# Patient Record
Sex: FEMALE | Race: WHITE | NOT HISPANIC OR LATINO | Employment: UNEMPLOYED | URBAN - METROPOLITAN AREA
[De-identification: names, ages, dates, MRNs, and addresses within clinical notes are randomized per-mention and may not be internally consistent; named-entity substitution may affect disease eponyms.]

---

## 2017-08-03 ENCOUNTER — TELEPHONE (OUTPATIENT)
Dept: PRIMARY CARE CLINIC | Facility: CLINIC | Age: 42
End: 2017-08-03

## 2017-08-03 NOTE — TELEPHONE ENCOUNTER
----- Message from Lillian Wing sent at 8/1/2017  9:44 AM CDT -----  Contact patient regarding request for MRI, she states insurance denied MRI due to incomplete information and when sent it was not sent on time, MRI needs to be preformed on both kneescontact patient [phone].    Thank you

## 2017-08-03 NOTE — TELEPHONE ENCOUNTER
Patient calling requesting new order for MRI of both knees to be submitted again.. She was denied because of insufficient records sent that was requested.

## 2017-08-31 ENCOUNTER — DOCUMENTATION ONLY (OUTPATIENT)
Dept: PODIATRY | Facility: CLINIC | Age: 42
End: 2017-08-31

## 2017-08-31 RX ORDER — HYDROCODONE BITARTRATE AND ACETAMINOPHEN 5; 325 MG/1; MG/1
1 TABLET ORAL EVERY 6 HOURS PRN
COMMUNITY
End: 2018-05-09

## 2017-08-31 RX ORDER — IBUPROFEN 800 MG/1
800 TABLET ORAL EVERY 8 HOURS PRN
Refills: 0 | COMMUNITY
Start: 2017-07-20 | End: 2018-12-10 | Stop reason: SDUPTHER

## 2017-08-31 RX ORDER — ALBUTEROL SULFATE 90 UG/1
AEROSOL, METERED RESPIRATORY (INHALATION)
Refills: 5 | COMMUNITY
Start: 2017-07-21 | End: 2018-02-23 | Stop reason: SDUPTHER

## 2017-08-31 RX ORDER — METHYLPREDNISOLONE 4 MG/1
TABLET ORAL
Refills: 0 | COMMUNITY
Start: 2017-07-10 | End: 2017-10-16

## 2017-08-31 RX ORDER — OXYCODONE HYDROCHLORIDE 30 MG/1
TABLET ORAL
Refills: 0 | COMMUNITY
Start: 2017-07-20 | End: 2018-11-08

## 2017-08-31 RX ORDER — GABAPENTIN 600 MG/1
TABLET ORAL
Refills: 0 | COMMUNITY
Start: 2017-07-20 | End: 2018-12-20 | Stop reason: SDUPTHER

## 2017-09-01 ENCOUNTER — OFFICE VISIT (OUTPATIENT)
Dept: PRIMARY CARE CLINIC | Facility: CLINIC | Age: 42
End: 2017-09-01
Payer: MEDICAID

## 2017-09-01 VITALS
TEMPERATURE: 99 F | BODY MASS INDEX: 43.4 KG/M2 | HEIGHT: 69 IN | DIASTOLIC BLOOD PRESSURE: 84 MMHG | HEART RATE: 77 BPM | WEIGHT: 293 LBS | OXYGEN SATURATION: 95 % | SYSTOLIC BLOOD PRESSURE: 124 MMHG | RESPIRATION RATE: 18 BRPM

## 2017-09-01 DIAGNOSIS — M54.50 CHRONIC BILATERAL LOW BACK PAIN WITHOUT SCIATICA: ICD-10-CM

## 2017-09-01 DIAGNOSIS — G89.4 CHRONIC PAIN SYNDROME: ICD-10-CM

## 2017-09-01 DIAGNOSIS — R53.81 PHYSICAL DECONDITIONING: ICD-10-CM

## 2017-09-01 DIAGNOSIS — E66.9 OBESITY, UNSPECIFIED OBESITY SEVERITY, UNSPECIFIED OBESITY TYPE: ICD-10-CM

## 2017-09-01 DIAGNOSIS — M17.0 PRIMARY OSTEOARTHRITIS OF BOTH KNEES: ICD-10-CM

## 2017-09-01 DIAGNOSIS — J45.30 MILD PERSISTENT ASTHMA WITHOUT COMPLICATION: Primary | ICD-10-CM

## 2017-09-01 DIAGNOSIS — G89.29 CHRONIC BILATERAL LOW BACK PAIN WITHOUT SCIATICA: ICD-10-CM

## 2017-09-01 PROCEDURE — 3008F BODY MASS INDEX DOCD: CPT | Mod: S$GLB,,, | Performed by: INTERNAL MEDICINE

## 2017-09-01 PROCEDURE — 99213 OFFICE O/P EST LOW 20 MIN: CPT | Mod: S$GLB,,, | Performed by: INTERNAL MEDICINE

## 2017-09-01 RX ORDER — MORPHINE SULFATE 15 MG/1
15 TABLET ORAL NIGHTLY
COMMUNITY
End: 2017-11-15

## 2017-09-02 NOTE — PROGRESS NOTES
Subjective:       Patient ID: Qiana Collins is a 42 y.o. female.    Chief Complaint: Follow-up (mri ordered)    HPI  Pt is 42 WF with h/o severe degenerative arthritis both knees bone on bone seen ortho at [facility] need knee replacements no medical records from [facility] available yet on pain meds  And morbid obesity try get gastric sleeve not approve yet knees hurt more with standing and ambulation not able to work get any gainful occupations           Also asthma multiple meds order for paintemance LABA but not covered  Review of Systems    Objective:      Physical Exam   Constitutional: She is oriented to person, place, and time. She appears well-developed and well-nourished. No distress.   Morbid obesed   HENT:   Head: Normocephalic and atraumatic.   Right Ear: External ear normal.   Left Ear: External ear normal.   Nose: Nose normal.   Mouth/Throat: Oropharynx is clear and moist. No oropharyngeal exudate.   Eyes: Conjunctivae and EOM are normal. Pupils are equal, round, and reactive to light. Right eye exhibits no discharge. Left eye exhibits no discharge.   Neck: Normal range of motion. Neck supple. No thyromegaly present.   Cardiovascular: Normal rate, regular rhythm, normal heart sounds and intact distal pulses.  Exam reveals no gallop and no friction rub.    No murmur heard.  Pulmonary/Chest: Effort normal and breath sounds normal. No respiratory distress. She has no wheezes. She has no rales. She exhibits no tenderness.   Abdominal: Soft. Bowel sounds are normal. She exhibits no distension. There is no tenderness. There is no rebound and no guarding.   Musculoskeletal: Normal range of motion. She exhibits tenderness (bilateral knee pain lower back pain). She exhibits no edema or deformity.   Lymphadenopathy:     She has no cervical adenopathy.   Neurological: She is alert and oriented to person, place, and time.   Skin: Skin is warm and dry. Capillary refill takes less than 2 seconds. No rash noted. No  erythema.   Psychiatric: She has a normal mood and affect. Judgment and thought content normal.   Nursing note and vitals reviewed.      Assessment:       1. Mild persistent asthma without complication    2. Primary osteoarthritis of both knees    3. Obesity, unspecified obesity severity, unspecified obesity type        Plan:       Mild persistent asthma without complication    Primary osteoarthritis of both knees    Obesity, unspecified obesity severity, unspecified obesity type    Other orders  -     mometasone 220 mcg (30 doses) inhaler; Inhale 2 puffs into the lungs once daily. Controller  Dispense: 2 each; Refill: 0

## 2017-10-16 ENCOUNTER — OFFICE VISIT (OUTPATIENT)
Dept: PRIMARY CARE CLINIC | Facility: CLINIC | Age: 42
End: 2017-10-16
Payer: MEDICAID

## 2017-10-16 ENCOUNTER — TELEPHONE (OUTPATIENT)
Dept: PRIMARY CARE CLINIC | Facility: CLINIC | Age: 42
End: 2017-10-16

## 2017-10-16 VITALS
WEIGHT: 293 LBS | BODY MASS INDEX: 47.09 KG/M2 | TEMPERATURE: 99 F | DIASTOLIC BLOOD PRESSURE: 89 MMHG | SYSTOLIC BLOOD PRESSURE: 137 MMHG | OXYGEN SATURATION: 94 % | RESPIRATION RATE: 18 BRPM | HEART RATE: 73 BPM | HEIGHT: 66 IN

## 2017-10-16 DIAGNOSIS — G89.4 CHRONIC PAIN SYNDROME: ICD-10-CM

## 2017-10-16 DIAGNOSIS — I83.813 VARICOSE VEINS OF LEG WITH PAIN, BILATERAL: Primary | ICD-10-CM

## 2017-10-16 DIAGNOSIS — M25.562 CHRONIC PAIN OF BOTH KNEES: ICD-10-CM

## 2017-10-16 DIAGNOSIS — G89.29 CHRONIC BILATERAL LOW BACK PAIN WITHOUT SCIATICA: ICD-10-CM

## 2017-10-16 DIAGNOSIS — M25.561 CHRONIC PAIN OF BOTH KNEES: ICD-10-CM

## 2017-10-16 DIAGNOSIS — M17.0 PRIMARY OSTEOARTHRITIS OF BOTH KNEES: ICD-10-CM

## 2017-10-16 DIAGNOSIS — E66.9 OBESITY, UNSPECIFIED OBESITY SEVERITY, UNSPECIFIED OBESITY TYPE: ICD-10-CM

## 2017-10-16 DIAGNOSIS — M54.50 CHRONIC BILATERAL LOW BACK PAIN WITHOUT SCIATICA: ICD-10-CM

## 2017-10-16 DIAGNOSIS — G89.29 CHRONIC PAIN OF BOTH KNEES: ICD-10-CM

## 2017-10-16 PROCEDURE — 99213 OFFICE O/P EST LOW 20 MIN: CPT | Mod: S$PBB,,, | Performed by: INTERNAL MEDICINE

## 2017-10-16 PROCEDURE — 99214 OFFICE O/P EST MOD 30 MIN: CPT | Mod: PBBFAC,PN | Performed by: INTERNAL MEDICINE

## 2017-10-16 PROCEDURE — 99999 PR PBB SHADOW E&M-EST. PATIENT-LVL IV: CPT | Mod: PBBFAC,,, | Performed by: INTERNAL MEDICINE

## 2017-10-16 RX ORDER — MORPHINE SULFATE 15 MG/1
TABLET, FILM COATED, EXTENDED RELEASE ORAL
COMMUNITY
Start: 2017-08-17 | End: 2017-10-16

## 2017-10-16 RX ORDER — NALOXONE HYDROCHLORIDE 4 MG/.1ML
SPRAY NASAL
COMMUNITY
Start: 2017-07-20 | End: 2018-11-08

## 2017-10-16 NOTE — TELEPHONE ENCOUNTER
----- Message from Arabella Garay sent at 10/16/2017 11:55 AM CDT -----  Contact: self  Patient needs to be seen today due to right leg behind knee and left ankle being swollen and leg is hot to touch and very painful.  This has been going on for 4 days. Please call back at [phone] (home)

## 2017-10-17 ENCOUNTER — TELEPHONE (OUTPATIENT)
Dept: PRIMARY CARE CLINIC | Facility: CLINIC | Age: 42
End: 2017-10-17

## 2017-10-17 NOTE — TELEPHONE ENCOUNTER
----- Message from Tatyana Jean sent at 10/17/2017 10:54 AM CDT -----  Contact: Patient  Qiana, patient  [phone], Calling for ultrasound results, done yesterday. Please advise. Thanks.

## 2017-10-17 NOTE — PROGRESS NOTES
Subjective:       Patient ID: Qiana Collins is a 42 y.o. female.    Chief Complaint: Leg Pain (bilateral)    HPI   Pt c/o bilateral legs pain not only in knees but cleves rt side thigh no sob cp no fever chiol still await for NALINI MRI and surgery will get knee injection next appt not pregnant no trauma  Review of Systems    Objective:      Physical Exam   Constitutional: She is oriented to person, place, and time. She appears well-developed and well-nourished. No distress.   obesed   HENT:   Head: Normocephalic and atraumatic.   Right Ear: External ear normal.   Left Ear: External ear normal.   Nose: Nose normal.   Mouth/Throat: Oropharynx is clear and moist. No oropharyngeal exudate.   Eyes: Conjunctivae and EOM are normal. Pupils are equal, round, and reactive to light. Right eye exhibits no discharge. Left eye exhibits no discharge.   Neck: Normal range of motion. Neck supple. No thyromegaly present.   Cardiovascular: Normal rate, regular rhythm, normal heart sounds and intact distal pulses.  Exam reveals no gallop and no friction rub.    No murmur heard.  Pulmonary/Chest: Effort normal and breath sounds normal. No respiratory distress. She has no wheezes. She has no rales. She exhibits no tenderness.   Abdominal: Soft. Bowel sounds are normal. She exhibits no distension. There is no tenderness. There is no rebound and no guarding.   Musculoskeletal: Normal range of motion. She exhibits tenderness (bilateral knee pain). She exhibits no edema or deformity.   Lymphadenopathy:     She has no cervical adenopathy.   Neurological: She is alert and oriented to person, place, and time.   Skin: Skin is warm and dry. Capillary refill takes less than 2 seconds. No rash noted. No erythema.   No redness in legs no swelling edema no hoffnamn siigns   Psychiatric: She has a normal mood and affect. Judgment and thought content normal.   Nursing note and vitals reviewed.      Assessment:       1. Varicose veins of leg with  pain, bilateral    2. Chronic pain of both knees        Plan:       Varicose veins of leg with pain, bilateral  -     VAS US Venous Legs Bilateral; Future; Expected date: 10/16/2017    Chronic pain of both knees  -     Ambulatory Referral to Orthopedics

## 2017-11-15 ENCOUNTER — NURSE TRIAGE (OUTPATIENT)
Dept: ADMINISTRATIVE | Facility: CLINIC | Age: 42
End: 2017-11-15

## 2017-11-15 ENCOUNTER — OFFICE VISIT (OUTPATIENT)
Dept: PRIMARY CARE CLINIC | Facility: CLINIC | Age: 42
End: 2017-11-15
Payer: MEDICAID

## 2017-11-15 ENCOUNTER — TELEPHONE (OUTPATIENT)
Dept: PRIMARY CARE CLINIC | Facility: CLINIC | Age: 42
End: 2017-11-15

## 2017-11-15 VITALS
HEART RATE: 71 BPM | RESPIRATION RATE: 18 BRPM | DIASTOLIC BLOOD PRESSURE: 85 MMHG | WEIGHT: 293 LBS | HEIGHT: 66 IN | SYSTOLIC BLOOD PRESSURE: 145 MMHG | TEMPERATURE: 99 F | BODY MASS INDEX: 47.09 KG/M2 | OXYGEN SATURATION: 94 %

## 2017-11-15 DIAGNOSIS — M54.50 CHRONIC BILATERAL LOW BACK PAIN WITHOUT SCIATICA: ICD-10-CM

## 2017-11-15 DIAGNOSIS — E66.01 MORBID OBESITY: Primary | ICD-10-CM

## 2017-11-15 DIAGNOSIS — G89.4 CHRONIC PAIN SYNDROME: ICD-10-CM

## 2017-11-15 DIAGNOSIS — G89.29 CHRONIC BILATERAL LOW BACK PAIN WITHOUT SCIATICA: ICD-10-CM

## 2017-11-15 DIAGNOSIS — I10 ESSENTIAL HYPERTENSION: ICD-10-CM

## 2017-11-15 DIAGNOSIS — M17.0 PRIMARY OSTEOARTHRITIS OF BOTH KNEES: ICD-10-CM

## 2017-11-15 PROCEDURE — 99213 OFFICE O/P EST LOW 20 MIN: CPT | Mod: S$PBB,,, | Performed by: INTERNAL MEDICINE

## 2017-11-15 PROCEDURE — 99999 PR PBB SHADOW E&M-EST. PATIENT-LVL IV: CPT | Mod: PBBFAC,,, | Performed by: INTERNAL MEDICINE

## 2017-11-15 PROCEDURE — 99214 OFFICE O/P EST MOD 30 MIN: CPT | Mod: PBBFAC,PN | Performed by: INTERNAL MEDICINE

## 2017-11-15 RX ORDER — MORPHINE SULFATE 15 MG/1
TABLET, FILM COATED, EXTENDED RELEASE ORAL
COMMUNITY
Start: 2017-11-09 | End: 2018-11-08

## 2017-11-15 NOTE — TELEPHONE ENCOUNTER
----- Message from Milind Hay sent at 11/15/2017  8:42 AM CST -----  Contact: Qiana  Patient 174/104 yesterday. She has headache, dizziness. She has not gotten up yet to walk around yet. She needs an appointment today. She spoke with on call. [phone] (home)   thanks

## 2017-11-15 NOTE — TELEPHONE ENCOUNTER
Reason for Disposition   BP > 160 / 100 and any cardiac or neurologic symptoms (e.g., chest pain, difficulty breathing, unsteady gait, blurred vision)    Protocols used:  HIGH BLOOD PRESSURE-A-OH    Ms. Collins states her blood pressure was 174/104, then 174/107 on yesterday. Patient states she is dizzy and has a headache.

## 2017-11-19 NOTE — PROGRESS NOTES
Subjective:       Patient ID: Qiana Collins is a 42 y.o. female.    Chief Complaint: Headache and Dizziness    HPI  Pt c/o HA and dizziness BP was high at home better now no sob cp no weakness no n/v gaining wt  Review of Systems    Objective:      Physical Exam   Constitutional: She is oriented to person, place, and time. She appears well-developed and well-nourished. No distress.   obesed   HENT:   Head: Normocephalic and atraumatic.   Right Ear: External ear normal.   Left Ear: External ear normal.   Nose: Nose normal.   Mouth/Throat: Oropharynx is clear and moist. No oropharyngeal exudate.   Eyes: Conjunctivae and EOM are normal. Pupils are equal, round, and reactive to light. Right eye exhibits no discharge. Left eye exhibits no discharge.   Neck: Normal range of motion. Neck supple. No thyromegaly present.   Cardiovascular: Normal rate, regular rhythm, normal heart sounds and intact distal pulses.  Exam reveals no gallop and no friction rub.    No murmur heard.  Pulmonary/Chest: Effort normal and breath sounds normal. No respiratory distress. She has no wheezes. She has no rales. She exhibits no tenderness.   Abdominal: Soft. Bowel sounds are normal. She exhibits no distension. There is no tenderness. There is no rebound and no guarding.   Musculoskeletal: Normal range of motion. She exhibits no edema, tenderness or deformity.   Lymphadenopathy:     She has no cervical adenopathy.   Neurological: She is alert and oriented to person, place, and time.   Skin: Skin is warm and dry. Capillary refill takes less than 2 seconds. No rash noted. No erythema.   Psychiatric: She has a normal mood and affect. Judgment and thought content normal.   Nursing note and vitals reviewed.      Assessment:       1. Morbid obesity    2. Essential hypertension    3. Primary osteoarthritis of both knees    4. Chronic bilateral low back pain without sciatica    5. Chronic pain syndrome        Plan:       Morbid obesity  -      Ambulatory Referral to Bariatric Surgery ([facility] [place])    Essential hypertension  Comments:  observe try lisinopril 10 mg po qd check BP in 1 week    Primary osteoarthritis of both knees  Comments:  need knee replacement     Chronic bilateral low back pain without sciatica  Comments:  secondary overwt need gastric sleeve    Chronic pain syndrome

## 2018-02-23 RX ORDER — ALBUTEROL SULFATE 90 UG/1
AEROSOL, METERED RESPIRATORY (INHALATION)
Qty: 18 INHALER | Refills: 5 | Status: SHIPPED | OUTPATIENT
Start: 2018-02-23 | End: 2018-02-27 | Stop reason: SDUPTHER

## 2018-02-27 NOTE — TELEPHONE ENCOUNTER
----- Message from Tatyana Jean sent at 2/23/2018 12:33 PM CST -----  Contact: Patient  Qiana, patient [phone], Calling for refill on Rx VENTOLIN HFA 90 mcg/actuation inhaler. Patient is completely out, needs it very badly. Please advise. Thanks.      [facility]/pharmacy #[id number] - [place], [place] - [address]  [address] [place] [address]  Phone: [phone] Fax: [phone]

## 2018-03-01 RX ORDER — ALBUTEROL SULFATE 90 UG/1
AEROSOL, METERED RESPIRATORY (INHALATION)
Qty: 18 INHALER | Refills: 5 | Status: SHIPPED | OUTPATIENT
Start: 2018-03-01 | End: 2018-05-09 | Stop reason: SDUPTHER

## 2018-05-08 ENCOUNTER — TELEPHONE (OUTPATIENT)
Dept: PRIMARY CARE CLINIC | Facility: CLINIC | Age: 43
End: 2018-05-08

## 2018-05-08 NOTE — TELEPHONE ENCOUNTER
"Spoke with patient, verbalized to patient once PCP evaluates her tomorrow we will have the CPT code for the diagnosis. Patient needs the code to give to the fee for service department to get the cost of injection. Verbalized to patient "You can still be seen tomorrow and if the provider feels an injection is needed then we can bill you for the injection." Patient verbalized understanding.   "

## 2018-05-08 NOTE — TELEPHONE ENCOUNTER
----- Message from Lillian Chakraborty sent at 5/8/2018  2:36 PM CDT -----  Patient has appointment scheduled tomorrow morning at 8:00/would like to receive injection for knee pain/medicaid will not cover/patient needs five digit procedure code to give to Fee for Service to get estimate of costs/please call back at [phone] to advise.

## 2018-05-09 ENCOUNTER — OFFICE VISIT (OUTPATIENT)
Dept: PRIMARY CARE CLINIC | Facility: CLINIC | Age: 43
End: 2018-05-09
Payer: MEDICAID

## 2018-05-09 ENCOUNTER — TELEPHONE (OUTPATIENT)
Dept: PRIMARY CARE CLINIC | Facility: CLINIC | Age: 43
End: 2018-05-09

## 2018-05-09 VITALS
RESPIRATION RATE: 18 BRPM | OXYGEN SATURATION: 98 % | HEART RATE: 88 BPM | TEMPERATURE: 98 F | SYSTOLIC BLOOD PRESSURE: 121 MMHG | DIASTOLIC BLOOD PRESSURE: 76 MMHG

## 2018-05-09 DIAGNOSIS — J45.30 MILD PERSISTENT ASTHMA WITHOUT COMPLICATION: ICD-10-CM

## 2018-05-09 DIAGNOSIS — E66.01 MORBID OBESITY: ICD-10-CM

## 2018-05-09 DIAGNOSIS — M17.0 PRIMARY OSTEOARTHRITIS OF BOTH KNEES: Primary | ICD-10-CM

## 2018-05-09 DIAGNOSIS — I10 ESSENTIAL HYPERTENSION: ICD-10-CM

## 2018-05-09 PROCEDURE — 20605 DRAIN/INJ JOINT/BURSA W/O US: CPT | Mod: S$PBB,50,, | Performed by: INTERNAL MEDICINE

## 2018-05-09 PROCEDURE — 20605 DRAIN/INJ JOINT/BURSA W/O US: CPT | Mod: PBBFAC,PN | Performed by: INTERNAL MEDICINE

## 2018-05-09 PROCEDURE — 99213 OFFICE O/P EST LOW 20 MIN: CPT | Mod: S$PBB,25,, | Performed by: INTERNAL MEDICINE

## 2018-05-09 PROCEDURE — 99213 OFFICE O/P EST LOW 20 MIN: CPT | Mod: PBBFAC,PN | Performed by: INTERNAL MEDICINE

## 2018-05-09 PROCEDURE — 99999 PR PBB SHADOW E&M-EST. PATIENT-LVL III: CPT | Mod: PBBFAC,,, | Performed by: INTERNAL MEDICINE

## 2018-05-09 RX ORDER — LISINOPRIL 10 MG/1
TABLET ORAL
COMMUNITY
Start: 2018-04-18 | End: 2018-05-09 | Stop reason: SDUPTHER

## 2018-05-09 RX ORDER — LISINOPRIL 10 MG/1
10 TABLET ORAL DAILY
Qty: 30 TABLET | Refills: 5 | Status: SHIPPED | OUTPATIENT
Start: 2018-05-09 | End: 2019-03-07 | Stop reason: SDUPTHER

## 2018-05-09 RX ORDER — ALBUTEROL SULFATE 90 UG/1
AEROSOL, METERED RESPIRATORY (INHALATION)
Qty: 18 INHALER | Refills: 5 | Status: SHIPPED | OUTPATIENT
Start: 2018-05-09 | End: 2018-08-02 | Stop reason: SDUPTHER

## 2018-05-09 RX ORDER — FUROSEMIDE 40 MG/1
TABLET ORAL
COMMUNITY
Start: 2018-02-01 | End: 2018-05-09 | Stop reason: SDUPTHER

## 2018-05-09 RX ORDER — FUROSEMIDE 40 MG/1
40 TABLET ORAL DAILY
Qty: 30 TABLET | Refills: 5 | Status: SHIPPED | OUTPATIENT
Start: 2018-05-09 | End: 2019-03-07 | Stop reason: SDUPTHER

## 2018-05-09 NOTE — TELEPHONE ENCOUNTER
----- Message from Tatyana Jean sent at 5/9/2018  3:46 PM CDT -----  Contact: Patient  Qiana, patient [phone], Calling because she was seen today, she was suppose to get three prescriptions faxed to [facility]. There is nothing there. Please call her. Thanks.

## 2018-05-10 NOTE — TELEPHONE ENCOUNTER
Spoke to pt. And notified her that her 3 refills were sent  Her pharmacy lastnight . Pt. Said she will check again and if she has any problems she will give me a call back.

## 2018-05-10 NOTE — TELEPHONE ENCOUNTER
----- Message from Angela Christian sent at 5/10/2018  4:20 PM CDT -----  Contact: self   Patient need a refill on all medications please send to [facility], any questions please call back at [phone] (home) Patient is out of medication    [facility] - [place], [place] - [address] [place] [address]  Phone: [phone] Fax: [phone]

## 2018-05-10 NOTE — PROGRESS NOTES
Subjective:       Patient ID: Qiana Collins is a 43 y.o. female.    Chief Complaint: Knee Pain (requestion knee injection)    HPI  Pt c/o sever pain both knees from OA need knee replacement and also await gastric sleeve to loose wt before kne replacement seeing ortho and [facility] no sob cp pt had relief with injections last time 5-6 mos ago  Review of Systems    Objective:      Physical Exam   Constitutional: She is oriented to person, place, and time. She appears well-developed and well-nourished. No distress.   obesed   HENT:   Head: Normocephalic and atraumatic.   Right Ear: External ear normal.   Left Ear: External ear normal.   Nose: Nose normal.   Mouth/Throat: Oropharynx is clear and moist. No oropharyngeal exudate.   Eyes: Conjunctivae and EOM are normal. Pupils are equal, round, and reactive to light. Right eye exhibits no discharge. Left eye exhibits no discharge.   Neck: Normal range of motion. Neck supple. No thyromegaly present.   Cardiovascular: Normal rate, regular rhythm, normal heart sounds and intact distal pulses.  Exam reveals no gallop and no friction rub.    No murmur heard.  Pulmonary/Chest: Effort normal and breath sounds normal. No respiratory distress. She has no wheezes. She has no rales. She exhibits no tenderness.   Abdominal: Soft. Bowel sounds are normal. She exhibits no distension. There is no tenderness. There is no rebound and no guarding.   Musculoskeletal: Normal range of motion. She exhibits tenderness (bilateral knee pain n swelling no erythema no heat pos crepitus). She exhibits no edema or deformity.   Lymphadenopathy:     She has no cervical adenopathy.   Neurological: She is alert and oriented to person, place, and time.   Skin: Skin is warm and dry. Capillary refill takes less than 2 seconds. No rash noted. No erythema.   Psychiatric: She has a normal mood and affect. Judgment and thought content normal.   Nursing note and vitals reviewed.      Assessment:        1. Primary osteoarthritis of both knees    2. Morbid obesity        Plan:       Primary osteoarthritis of both knees  Comments:  inject both knees with xylocain and 1cc kenalog tolerate well after spray to anesthetize skin    Morbid obesity  Comments:  await gastric sleeve    Other orders  -     lisinopril 10 MG tablet; Take 1 tablet (10 mg total) by mouth once daily.  Dispense: 30 tablet; Refill: 5  -     furosemide (LASIX) 40 MG tablet; Take 1 tablet (40 mg total) by mouth once daily.  Dispense: 30 tablet; Refill: 5  -     albuterol (VENTOLIN HFA) 90 mcg/actuation inhaler; 2 PUFFS AS NEEDED EVERY 4 HRS INHALATION  Dispense: 18 Inhaler; Refill: 5

## 2018-05-24 ENCOUNTER — OFFICE VISIT (OUTPATIENT)
Dept: PRIMARY CARE CLINIC | Facility: CLINIC | Age: 43
End: 2018-05-24
Payer: MEDICAID

## 2018-05-24 VITALS
TEMPERATURE: 99 F | WEIGHT: 293 LBS | DIASTOLIC BLOOD PRESSURE: 75 MMHG | SYSTOLIC BLOOD PRESSURE: 114 MMHG | RESPIRATION RATE: 18 BRPM | HEART RATE: 82 BPM | BODY MASS INDEX: 43.4 KG/M2 | HEIGHT: 69 IN | OXYGEN SATURATION: 95 %

## 2018-05-24 DIAGNOSIS — M17.0 PRIMARY OSTEOARTHRITIS OF BOTH KNEES: Primary | ICD-10-CM

## 2018-05-24 DIAGNOSIS — G89.29 CHRONIC PAIN OF LEFT KNEE: ICD-10-CM

## 2018-05-24 DIAGNOSIS — I10 ESSENTIAL HYPERTENSION: ICD-10-CM

## 2018-05-24 DIAGNOSIS — E66.01 MORBID OBESITY: ICD-10-CM

## 2018-05-24 DIAGNOSIS — M25.562 CHRONIC PAIN OF LEFT KNEE: ICD-10-CM

## 2018-05-24 PROCEDURE — 99213 OFFICE O/P EST LOW 20 MIN: CPT | Mod: S$PBB,,, | Performed by: INTERNAL MEDICINE

## 2018-05-24 PROCEDURE — 93010 ELECTROCARDIOGRAM REPORT: CPT | Mod: S$PBB,,, | Performed by: INTERNAL MEDICINE

## 2018-05-24 PROCEDURE — 93005 ELECTROCARDIOGRAM TRACING: CPT | Mod: PBBFAC,PN | Performed by: INTERNAL MEDICINE

## 2018-05-24 PROCEDURE — 99215 OFFICE O/P EST HI 40 MIN: CPT | Mod: PBBFAC,PN | Performed by: INTERNAL MEDICINE

## 2018-05-24 PROCEDURE — 99999 PR PBB SHADOW E&M-EST. PATIENT-LVL V: CPT | Mod: PBBFAC,,, | Performed by: INTERNAL MEDICINE

## 2018-05-24 RX ORDER — METHYLPREDNISOLONE 4 MG/1
TABLET ORAL
Qty: 1 PACKAGE | Refills: 0 | Status: SHIPPED | OUTPATIENT
Start: 2018-05-24 | End: 2018-08-02

## 2018-05-24 NOTE — PROGRESS NOTES
Subjective:       Patient ID: Qiana Collins is a 43 y.o. female.    Chief Complaint: Knee Pain (mri order)    HPI pt c/ohad knee injection not helping awai gastric sleeve at [facility] not seeing ortho can't find take her insurance  Rt knee hurt too but ot as bad left knee still hurt a lot crying   Review of Systems    Objective:      Physical Exam   Constitutional: She is oriented to person, place, and time. She appears well-developed and well-nourished. No distress.   HENT:   Head: Normocephalic and atraumatic.   Right Ear: External ear normal.   Left Ear: External ear normal.   Nose: Nose normal.   Mouth/Throat: Oropharynx is clear and moist. No oropharyngeal exudate.   Eyes: Conjunctivae and EOM are normal. Pupils are equal, round, and reactive to light. Right eye exhibits no discharge. Left eye exhibits no discharge.   Neck: Normal range of motion. Neck supple. No thyromegaly present.   Cardiovascular: Normal rate, regular rhythm, normal heart sounds and intact distal pulses.  Exam reveals no gallop and no friction rub.    No murmur heard.  Pulmonary/Chest: Effort normal and breath sounds normal. No respiratory distress. She has no wheezes. She has no rales. She exhibits no tenderness.   Abdominal: Soft. Bowel sounds are normal. She exhibits no distension. There is no tenderness. There is no rebound and no guarding.   Musculoskeletal: Normal range of motion. She exhibits tenderness (tenderness aoubd left knee joint with palpation no swelling no redness ). She exhibits no edema or deformity.   Lymphadenopathy:     She has no cervical adenopathy.   Neurological: She is alert and oriented to person, place, and time.   Skin: Skin is warm and dry. Capillary refill takes less than 2 seconds. No rash noted. No erythema.   Psychiatric: She has a normal mood and affect. Judgment and thought content normal.   Nursing note and vitals reviewed.      Assessment:       1. Primary osteoarthritis of both knees    2. Chronic  pain of left knee    3. Morbid obesity    4. Essential hypertension        Plan:       Primary osteoarthritis of both knees  -     X-Ray Knee 3 View Bilateral; Future; Expected date: 05/24/2018  -     methylPREDNISolone (MEDROL DOSEPACK) 4 mg tablet; use as directed  Dispense: 1 Package; Refill: 0  -     KNEE BRACE FOR HOME USE    Chronic pain of left knee  -     methylPREDNISolone (MEDROL DOSEPACK) 4 mg tablet; use as directed  Dispense: 1 Package; Refill: 0  -     KNEE BRACE FOR HOME USE  -     MRI Knee Without Contrast Left; Future; Expected date: 05/24/2018    Morbid obesity    Essential hypertension

## 2018-05-29 ENCOUNTER — CLINICAL SUPPORT (OUTPATIENT)
Dept: PRIMARY CARE CLINIC | Facility: CLINIC | Age: 43
End: 2018-05-29
Payer: MEDICAID

## 2018-05-29 DIAGNOSIS — E66.01 MORBID OBESITY: ICD-10-CM

## 2018-05-29 DIAGNOSIS — I10 ESSENTIAL HYPERTENSION: ICD-10-CM

## 2018-05-29 DIAGNOSIS — M25.562 CHRONIC PAIN OF LEFT KNEE: ICD-10-CM

## 2018-05-29 DIAGNOSIS — G89.29 CHRONIC PAIN OF LEFT KNEE: ICD-10-CM

## 2018-05-29 LAB
ALBUMIN SERPL BCP-MCNC: 3.7 G/DL
ALP SERPL-CCNC: 69 U/L
ALT SERPL W/O P-5'-P-CCNC: 26 U/L
ANION GAP SERPL CALC-SCNC: 8 MMOL/L
AST SERPL-CCNC: 24 U/L
BASOPHILS # BLD AUTO: 0.1 K/UL
BASOPHILS NFR BLD: 0.7 %
BILIRUB SERPL-MCNC: 0.5 MG/DL
BILIRUB SERPL-MCNC: NORMAL MG/DL
BLOOD URINE, POC: NORMAL
BUN SERPL-MCNC: 12 MG/DL
CALCIUM SERPL-MCNC: 9.1 MG/DL
CHLORIDE SERPL-SCNC: 102 MMOL/L
CHOLEST SERPL-MCNC: 192 MG/DL
CHOLEST/HDLC SERPL: 4.9 {RATIO}
CO2 SERPL-SCNC: 29 MMOL/L
COLOR, POC UA: YELLOW
CREAT SERPL-MCNC: 0.8 MG/DL
DIFFERENTIAL METHOD: ABNORMAL
EKG 12-LEAD: NORMAL
EOSINOPHIL # BLD AUTO: 0.5 K/UL
EOSINOPHIL NFR BLD: 4.1 %
ERYTHROCYTE [DISTWIDTH] IN BLOOD BY AUTOMATED COUNT: 14.3 %
EST. GFR  (AFRICAN AMERICAN): >60 ML/MIN/1.73 M^2
EST. GFR  (NON AFRICAN AMERICAN): >60 ML/MIN/1.73 M^2
GLUCOSE SERPL-MCNC: 142 MG/DL
GLUCOSE UR QL STRIP: NORMAL
HCT VFR BLD AUTO: 43.4 %
HDLC SERPL-MCNC: 39 MG/DL
HDLC SERPL: 20.3 %
HGB BLD-MCNC: 14.3 G/DL
KETONES UR QL STRIP: NORMAL
LDLC SERPL CALC-MCNC: 127 MG/DL
LEUKOCYTE ESTERASE URINE, POC: NORMAL
LYMPHOCYTES # BLD AUTO: 2.6 K/UL
LYMPHOCYTES NFR BLD: 22.3 %
MCH RBC QN AUTO: 31.6 PG
MCHC RBC AUTO-ENTMCNC: 33 G/DL
MCV RBC AUTO: 96 FL
MONOCYTES # BLD AUTO: 0.9 K/UL
MONOCYTES NFR BLD: 7.9 %
NEUTROPHILS # BLD AUTO: 7.5 K/UL
NEUTROPHILS NFR BLD: 65 %
NITRITE, POC UA: NORMAL
NONHDLC SERPL-MCNC: 153 MG/DL
PH, POC UA: 7
PLATELET # BLD AUTO: 157 K/UL
PMV BLD AUTO: 11.8 FL
POTASSIUM SERPL-SCNC: 4.1 MMOL/L
PROT SERPL-MCNC: 7.4 G/DL
PROTEIN, POC: NORMAL
RBC # BLD AUTO: 4.53 M/UL
RHEUMATOID FACT SERPL-ACNC: <10 IU/ML
SODIUM SERPL-SCNC: 139 MMOL/L
SPECIFIC GRAVITY, POC UA: 1.01
T4 FREE SERPL-MCNC: 0.93 NG/DL
TRIGL SERPL-MCNC: 132 MG/DL
TSH SERPL DL<=0.005 MIU/L-ACNC: 1.32 UIU/ML
URATE SERPL-MCNC: 7.4 MG/DL
UROBILINOGEN, POC UA: NORMAL
WBC # BLD AUTO: 11.5 K/UL

## 2018-05-29 PROCEDURE — 99999 PR PBB SHADOW E&M-EST. PATIENT-LVL II: CPT | Mod: PBBFAC,,,

## 2018-05-29 PROCEDURE — 80061 LIPID PANEL: CPT

## 2018-05-29 PROCEDURE — 84439 ASSAY OF FREE THYROXINE: CPT

## 2018-05-29 PROCEDURE — 99212 OFFICE O/P EST SF 10 MIN: CPT | Mod: PBBFAC,PN

## 2018-05-29 PROCEDURE — 86431 RHEUMATOID FACTOR QUANT: CPT

## 2018-05-29 PROCEDURE — 80053 COMPREHEN METABOLIC PANEL: CPT

## 2018-05-29 PROCEDURE — 81002 URINALYSIS NONAUTO W/O SCOPE: CPT | Mod: PBBFAC,PN

## 2018-05-29 PROCEDURE — 84550 ASSAY OF BLOOD/URIC ACID: CPT

## 2018-05-29 PROCEDURE — 84443 ASSAY THYROID STIM HORMONE: CPT

## 2018-05-29 PROCEDURE — 85025 COMPLETE CBC W/AUTO DIFF WBC: CPT

## 2018-06-08 ENCOUNTER — TELEPHONE (OUTPATIENT)
Dept: PRIMARY CARE CLINIC | Facility: CLINIC | Age: 43
End: 2018-06-08

## 2018-06-08 DIAGNOSIS — T14.8XXA CARTILAGE TEAR: ICD-10-CM

## 2018-06-08 DIAGNOSIS — R93.89 ABNORMAL MRI: Primary | ICD-10-CM

## 2018-06-08 NOTE — TELEPHONE ENCOUNTER
Notified patient, patient states that [facility] is taking medicaid. However, I gave a name to patient for MD Javi Casper Please sign referral

## 2018-06-08 NOTE — TELEPHONE ENCOUNTER
----- Message from Boris Kelly sent at 6/8/2018  3:00 PM CDT -----  Contact: same  Type:  Test Results    Who Called:  patient  Name of Test (Lab/Mammo/Etc):  MRI  Date of Test:  6/7/18  Ordering Provider:  Blas  Where the test was performed:  [facility]  Best Call Back Number:  [phone]  Additional Information:  n/a

## 2018-06-08 NOTE — TELEPHONE ENCOUNTER
PER Dr. Odonnell  No cartilage of the left side of the left knee joint. Tear of cartilage in back of the knee joint as well. They have a fragment of cartilage floating in the knee joint. Needs to see an orthopedic for a scope?    Called patient, left VM

## 2018-06-12 ENCOUNTER — TELEPHONE (OUTPATIENT)
Dept: PRIMARY CARE CLINIC | Facility: CLINIC | Age: 43
End: 2018-06-12

## 2018-06-12 NOTE — TELEPHONE ENCOUNTER
----- Message from Opal Oleary sent at 6/12/2018  2:29 PM CDT -----  Contact: [phone]  Patient is requesting a call back from the nurse, requesting copy of mri of left knee.  Please call the patient upon request at phone number [phone].

## 2018-07-27 ENCOUNTER — TELEPHONE (OUTPATIENT)
Dept: PRIMARY CARE CLINIC | Facility: CLINIC | Age: 43
End: 2018-07-27

## 2018-07-27 NOTE — TELEPHONE ENCOUNTER
----- Message from Tatyana Jean sent at 7/27/2018  1:54 PM CDT -----  Contact: Patient  Type:  Same Day Appointment Request    Caller is requesting a same day appointment.  Caller declined first available appointment listed below.      Name of Caller:  Eulalia, patient  When is the first available appointment?  08/09/2018  Symptoms:  Both legs swelling  Best Call Back Number:  [phone]  Additional Information:   Please call her. Thanks.

## 2018-07-27 NOTE — TELEPHONE ENCOUNTER
"Spoke with pt and she reports that her legs keep swelling. Pt reports that she has been taking her fluid pills like she is supposed to. Right now pt reports that she has ice on her legs. Pt states, "I do not feel bad or anything." I explained to pt that we do not have any openings today with either provider. Pt reports that she can be seen next week. I scheduled pt an appnt next week with Dr Odonnell. I advised pt that if her legs become red, hot, painful, or develops a fever then needs to go to the ER. Pt verbalized understanding and states that she is fine right now, but if anything changes then she will definitely go.   "

## 2018-08-02 ENCOUNTER — OFFICE VISIT (OUTPATIENT)
Dept: PRIMARY CARE CLINIC | Facility: CLINIC | Age: 43
End: 2018-08-02
Payer: MEDICAID

## 2018-08-02 VITALS
OXYGEN SATURATION: 98 % | BODY MASS INDEX: 43.4 KG/M2 | RESPIRATION RATE: 18 BRPM | HEIGHT: 69 IN | SYSTOLIC BLOOD PRESSURE: 128 MMHG | WEIGHT: 293 LBS | HEART RATE: 103 BPM | TEMPERATURE: 99 F | DIASTOLIC BLOOD PRESSURE: 88 MMHG

## 2018-08-02 DIAGNOSIS — M17.0 PRIMARY OSTEOARTHRITIS OF BOTH KNEES: ICD-10-CM

## 2018-08-02 DIAGNOSIS — J45.30 MILD PERSISTENT ASTHMA WITHOUT COMPLICATION: ICD-10-CM

## 2018-08-02 DIAGNOSIS — R60.0 PERIPHERAL EDEMA: Primary | ICD-10-CM

## 2018-08-02 DIAGNOSIS — G44.209 TENSION-TYPE HEADACHE, NOT INTRACTABLE, UNSPECIFIED CHRONICITY PATTERN: ICD-10-CM

## 2018-08-02 DIAGNOSIS — E66.01 MORBID OBESITY: ICD-10-CM

## 2018-08-02 PROCEDURE — 99213 OFFICE O/P EST LOW 20 MIN: CPT | Mod: S$PBB,,, | Performed by: INTERNAL MEDICINE

## 2018-08-02 PROCEDURE — 99214 OFFICE O/P EST MOD 30 MIN: CPT | Mod: PBBFAC,PN | Performed by: INTERNAL MEDICINE

## 2018-08-02 PROCEDURE — 99999 PR PBB SHADOW E&M-EST. PATIENT-LVL IV: CPT | Mod: PBBFAC,,, | Performed by: INTERNAL MEDICINE

## 2018-08-02 RX ORDER — BUTALBITAL, ACETAMINOPHEN AND CAFFEINE 50; 325; 40 MG/1; MG/1; MG/1
1 TABLET ORAL EVERY 6 HOURS PRN
Qty: 30 TABLET | Refills: 0 | Status: SHIPPED | OUTPATIENT
Start: 2018-08-02 | End: 2018-09-01

## 2018-08-02 RX ORDER — POTASSIUM CHLORIDE 750 MG/1
10 CAPSULE, EXTENDED RELEASE ORAL DAILY
Qty: 30 CAPSULE | Refills: 5 | Status: SHIPPED | OUTPATIENT
Start: 2018-08-02 | End: 2019-02-05 | Stop reason: SDUPTHER

## 2018-08-02 RX ORDER — ALBUTEROL SULFATE 90 UG/1
AEROSOL, METERED RESPIRATORY (INHALATION)
Qty: 18 INHALER | Refills: 5 | Status: SHIPPED | OUTPATIENT
Start: 2018-08-02 | End: 2018-12-20 | Stop reason: SDUPTHER

## 2018-08-02 NOTE — PROGRESS NOTES
Subjective:       Patient ID: Qiana Collins is a 43 y.o. female.    Chief Complaint: Leg Swelling (bilateral ) and Migraine (states chronic every day for x1 month )    HPI  Pt still with fluid in legs no sob cp no fever chill no n/v/d await gastric sleeve taking lasix one a day and c/o HA BP normal  Review of Systems    Objective:      Physical Exam   Constitutional: She is oriented to person, place, and time. She appears well-developed and well-nourished. No distress.   obesed   HENT:   Head: Normocephalic and atraumatic.   Right Ear: External ear normal.   Left Ear: External ear normal.   Nose: Nose normal.   Mouth/Throat: Oropharynx is clear and moist. No oropharyngeal exudate.   Eyes: Conjunctivae and EOM are normal. Pupils are equal, round, and reactive to light. Right eye exhibits no discharge. Left eye exhibits no discharge.   Neck: Normal range of motion. Neck supple. No thyromegaly present.   Cardiovascular: Normal rate, regular rhythm, normal heart sounds and intact distal pulses.  Exam reveals no gallop and no friction rub.    No murmur heard.  Pulmonary/Chest: Effort normal and breath sounds normal. No respiratory distress. She has no wheezes. She has no rales. She exhibits no tenderness.   Abdominal: Soft. Bowel sounds are normal. She exhibits no distension. There is no tenderness. There is no rebound and no guarding.   Musculoskeletal: Normal range of motion. She exhibits edema (plus 2-3 pitting edema in feet ankle lower legs) and tenderness (bilateral knee pain). She exhibits no deformity.   Lymphadenopathy:     She has no cervical adenopathy.   Neurological: She is alert and oriented to person, place, and time.   Skin: Skin is warm and dry. Capillary refill takes less than 2 seconds. No rash noted. No erythema.   Psychiatric: She has a normal mood and affect. Judgment and thought content normal.   Nursing note and vitals reviewed.      Assessment:       1. Peripheral edema    2. Mild persistent  asthma without complication    3. Morbid obesity    4. Primary osteoarthritis of both knees    5. Tension-type headache, not intractable, unspecified chronicity pattern        Plan:       Peripheral edema  Comments:  restrict po fluid 1500cc/ 24 hrs    Mild persistent asthma without complication  -     albuterol (VENTOLIN HFA) 90 mcg/actuation inhaler; 2 PUFFS AS NEEDED EVERY 4 HRS INHALATION  Dispense: 18 Inhaler; Refill: 5    Morbid obesity  Comments:  waiting list for gastric sleeve    Primary osteoarthritis of both knees  Comments:  pain management    Tension-type headache, not intractable, unspecified chronicity pattern    Other orders  -     butalbital-acetaminophen-caffeine -40 mg (FIORICET, ESGIC) -40 mg per tablet; Take 1 tablet by mouth every 6 (six) hours as needed.  Dispense: 30 tablet; Refill: 0  -     potassium chloride (MICRO-K) 10 MEQ CpSR; Take 1 capsule (10 mEq total) by mouth once daily.  Dispense: 30 capsule; Refill: 5

## 2018-11-08 ENCOUNTER — OFFICE VISIT (OUTPATIENT)
Dept: PRIMARY CARE CLINIC | Facility: CLINIC | Age: 43
End: 2018-11-08
Payer: MEDICAID

## 2018-11-08 VITALS
HEART RATE: 63 BPM | WEIGHT: 293 LBS | TEMPERATURE: 98 F | OXYGEN SATURATION: 98 % | BODY MASS INDEX: 43.4 KG/M2 | RESPIRATION RATE: 18 BRPM | HEIGHT: 69 IN | SYSTOLIC BLOOD PRESSURE: 104 MMHG | DIASTOLIC BLOOD PRESSURE: 64 MMHG

## 2018-11-08 DIAGNOSIS — K52.1 DIARRHEA DUE TO DRUG: ICD-10-CM

## 2018-11-08 DIAGNOSIS — E11.9 NEW ONSET TYPE 2 DIABETES MELLITUS: Primary | ICD-10-CM

## 2018-11-08 PROCEDURE — 99213 OFFICE O/P EST LOW 20 MIN: CPT | Mod: PBBFAC,PN | Performed by: INTERNAL MEDICINE

## 2018-11-08 PROCEDURE — 99999 PR PBB SHADOW E&M-EST. PATIENT-LVL III: CPT | Mod: PBBFAC,,, | Performed by: INTERNAL MEDICINE

## 2018-11-08 PROCEDURE — 99213 OFFICE O/P EST LOW 20 MIN: CPT | Mod: S$PBB,,, | Performed by: INTERNAL MEDICINE

## 2018-11-08 RX ORDER — SALMETEROL XINAFOATE 50 UG/1
2 POWDER, METERED ORAL; RESPIRATORY (INHALATION) 2 TIMES DAILY
Refills: 0 | COMMUNITY
Start: 2018-10-31 | End: 2019-01-10 | Stop reason: SDUPTHER

## 2018-11-08 RX ORDER — QUETIAPINE FUMARATE 100 MG/1
100 TABLET, FILM COATED ORAL NIGHTLY
Refills: 0 | COMMUNITY
Start: 2018-10-31 | End: 2018-12-06 | Stop reason: SDUPTHER

## 2018-11-08 RX ORDER — INSULIN PUMP SYRINGE, 3 ML
EACH MISCELLANEOUS
Qty: 1 EACH | Refills: 0 | Status: SHIPPED | OUTPATIENT
Start: 2018-11-08 | End: 2019-08-28 | Stop reason: SDUPTHER

## 2018-11-08 RX ORDER — LANCETS 33 GAUGE
1 EACH MISCELLANEOUS DAILY
Qty: 100 EACH | Refills: 5 | Status: SHIPPED | OUTPATIENT
Start: 2018-11-08 | End: 2019-08-28 | Stop reason: SDUPTHER

## 2018-11-08 RX ORDER — DEXAMETHASONE 4 MG/1
2 TABLET ORAL 2 TIMES DAILY
Refills: 0 | COMMUNITY
Start: 2018-10-31 | End: 2019-08-28 | Stop reason: SDUPTHER

## 2018-11-08 RX ORDER — METFORMIN HYDROCHLORIDE 500 MG/1
500 TABLET ORAL 2 TIMES DAILY
Refills: 0 | COMMUNITY
Start: 2018-10-31 | End: 2018-12-01 | Stop reason: SDUPTHER

## 2018-11-09 NOTE — PROGRESS NOTES
Subjective:       Patient ID: Qiana Collins is a 43 y.o. female.    Chief Complaint: Diabetes and Palpitations    HPI  patient was seen in the ER couple weeks ago toe the have a diabetes with high sugar the an put on metformin 500 mg twice a day patient is here for follow-up still have chronic knee pain a lot worse on the left than on the right from a severe osteoarthritis due to obesity and meniscus tear patient was on chronic pain management taking multiple pain medication today but able to go into rehab and get herself off oral pain medication also begin to get on diet and losing weight lost 36 lb in the last 2 months also complained of diarrhea since on metformin she also need to get a glucometer to do Accu-Chek at home  Review of Systems    Objective:      Physical Exam   Constitutional: She is oriented to person, place, and time. She appears well-developed and well-nourished. No distress.   Overweight   HENT:   Head: Normocephalic and atraumatic.   Right Ear: External ear normal.   Left Ear: External ear normal.   Nose: Nose normal.   Mouth/Throat: Oropharynx is clear and moist. No oropharyngeal exudate.   Eyes: Conjunctivae and EOM are normal. Pupils are equal, round, and reactive to light. Right eye exhibits no discharge. Left eye exhibits no discharge.   Neck: Normal range of motion. Neck supple. No thyromegaly present.   Cardiovascular: Normal rate, regular rhythm, normal heart sounds and intact distal pulses. Exam reveals no gallop and no friction rub.   No murmur heard.  Pulmonary/Chest: Effort normal and breath sounds normal. No respiratory distress. She has no wheezes. She has no rales. She exhibits no tenderness.   Abdominal: Soft. Bowel sounds are normal. She exhibits no distension. There is no tenderness. There is no rebound and no guarding.   Musculoskeletal: Normal range of motion. She exhibits tenderness (Tenderness left knee most at the lateral aspect of the patella slightly warm to  touch). She exhibits no edema or deformity.   Lymphadenopathy:     She has no cervical adenopathy.   Neurological: She is alert and oriented to person, place, and time.   Skin: Skin is warm and dry. Capillary refill takes less than 2 seconds. No rash noted. No erythema.   Psychiatric: She has a normal mood and affect. Judgment and thought content normal.   Nursing note and vitals reviewed.      Assessment:       1. New onset type 2 diabetes mellitus    2. Diarrhea due to drug        Plan:       New onset type 2 diabetes mellitus  -     blood-glucose meter kit; Use as instructed  Dispense: 1 each; Refill: 0  -     blood sugar diagnostic (SKYLER BLOOD GLUCOSE TEST STRIP) Strp; 1 strip by Misc.(Non-Drug; Combo Route) route once daily.  Dispense: 100 strip; Refill: 5  -     lancets 33 gauge Misc; 1 lancet by Misc.(Non-Drug; Combo Route) route once daily.  Dispense: 100 each; Refill: 5    Diarrhea due to drug  Comments:  Patient does not want to change medication will continue with metformin and see how she does

## 2018-11-12 ENCOUNTER — TELEPHONE (OUTPATIENT)
Dept: PRIMARY CARE CLINIC | Facility: CLINIC | Age: 43
End: 2018-11-12

## 2018-11-12 DIAGNOSIS — M17.0 PRIMARY OSTEOARTHRITIS OF BOTH KNEES: Primary | ICD-10-CM

## 2018-11-12 NOTE — TELEPHONE ENCOUNTER
----- Message from Andreai Aguilera sent at 11/12/2018 12:30 PM CST -----  Type:  Patient Requesting Referral    Who Called: Patient  Does the patient already have the specialty appointment scheduled?:  No  If yes, what is the date of that appointment?:  No  Referral to What Specialty:  orthopedic  Reason for Referral:  Left knee pain/torn meniscus  Does the patient want the referral with a specific physician?:    Dr. Silver Bob, fax # [phone]  [facility] the [facility]  Patient Requesting a Call Back?:  yes  Best Call Back Number:  [phone] (home)     Additional Information:   Stating need a referral to above physician for orthopedic

## 2018-11-12 NOTE — TELEPHONE ENCOUNTER
----- Message from Nicole Charles sent at 11/12/2018 12:18 PM CST -----  Contact: Self  Type: Needs Medical Advice    Who Called:  Patient     Best Call Back Number: [phone] (home)     Additional Information: Patient needs to know if her medicaid will cover steroid injections   Please call to advise

## 2018-11-26 ENCOUNTER — TELEPHONE (OUTPATIENT)
Dept: PRIMARY CARE CLINIC | Facility: CLINIC | Age: 43
End: 2018-11-26

## 2018-11-26 NOTE — TELEPHONE ENCOUNTER
----- Message from Tatyana Jean sent at 11/26/2018  9:52 AM CST -----  Contact: Patient  Type: Needs Medical Advice    Who Called:  Qiana patient  Symptoms (please be specific):  N/A  How long has patient had these symptoms:  N/A  Pharmacy name and phone #:  N/A  Best Call Back Number: [phone]  Additional Information: Calling because referral for Dr. Bob was never sent to them. Please send it. Thanks.

## 2018-12-02 RX ORDER — METFORMIN HYDROCHLORIDE 500 MG/1
TABLET ORAL
Qty: 60 TABLET | Refills: 2 | Status: SHIPPED | OUTPATIENT
Start: 2018-12-02 | End: 2019-02-21 | Stop reason: SDUPTHER

## 2018-12-06 NOTE — TELEPHONE ENCOUNTER
----- Message from Boris WILLAMS Frisard sent at 12/6/2018  1:00 PM CST -----  Contact: same  Patient called in and stated she is completely out of her Rx for QUEtiapine (SEROQUEL) 100 MG Tab.  Patient has an appt this Monday 12/10/18 but is out.      [facility] - [place], [place] - [address]  [address] [place] [address]  Phone: [phone] Fax: [phone]    Patient call back number is [phone]

## 2018-12-07 RX ORDER — QUETIAPINE FUMARATE 100 MG/1
100 TABLET, FILM COATED ORAL NIGHTLY
Qty: 30 TABLET | Refills: 5 | Status: SHIPPED | OUTPATIENT
Start: 2018-12-07 | End: 2019-05-31 | Stop reason: SDUPTHER

## 2018-12-10 ENCOUNTER — OFFICE VISIT (OUTPATIENT)
Dept: PRIMARY CARE CLINIC | Facility: CLINIC | Age: 43
End: 2018-12-10
Payer: MEDICAID

## 2018-12-10 VITALS
BODY MASS INDEX: 43.4 KG/M2 | WEIGHT: 293 LBS | DIASTOLIC BLOOD PRESSURE: 78 MMHG | RESPIRATION RATE: 18 BRPM | HEIGHT: 69 IN | TEMPERATURE: 99 F | SYSTOLIC BLOOD PRESSURE: 113 MMHG | HEART RATE: 74 BPM | OXYGEN SATURATION: 98 %

## 2018-12-10 DIAGNOSIS — M17.0 PRIMARY OSTEOARTHRITIS OF BOTH KNEES: Primary | ICD-10-CM

## 2018-12-10 DIAGNOSIS — B37.2 INTERTRIGINOUS CANDIDIASIS: ICD-10-CM

## 2018-12-10 DIAGNOSIS — E66.01 MORBID OBESITY: ICD-10-CM

## 2018-12-10 PROCEDURE — 99213 OFFICE O/P EST LOW 20 MIN: CPT | Mod: S$PBB,25,, | Performed by: INTERNAL MEDICINE

## 2018-12-10 PROCEDURE — 99214 OFFICE O/P EST MOD 30 MIN: CPT | Mod: PBBFAC,PN | Performed by: INTERNAL MEDICINE

## 2018-12-10 PROCEDURE — 20605 DRAIN/INJ JOINT/BURSA W/O US: CPT | Mod: S$PBB,50,, | Performed by: INTERNAL MEDICINE

## 2018-12-10 PROCEDURE — 20605 DRAIN/INJ JOINT/BURSA W/O US: CPT | Mod: PBBFAC,PN | Performed by: INTERNAL MEDICINE

## 2018-12-10 PROCEDURE — 99999 PR PBB SHADOW E&M-EST. PATIENT-LVL IV: CPT | Mod: PBBFAC,,, | Performed by: INTERNAL MEDICINE

## 2018-12-10 RX ORDER — QUETIAPINE FUMARATE 100 MG/1
100 TABLET, FILM COATED ORAL NIGHTLY
Qty: 30 TABLET | Refills: 5 | Status: CANCELLED | OUTPATIENT
Start: 2018-12-10

## 2018-12-10 RX ORDER — IBUPROFEN 800 MG/1
800 TABLET ORAL EVERY 8 HOURS PRN
Qty: 60 TABLET | Refills: 2 | Status: SHIPPED | OUTPATIENT
Start: 2018-12-10 | End: 2019-03-27 | Stop reason: SDUPTHER

## 2018-12-10 RX ORDER — CLOTRIMAZOLE AND BETAMETHASONE DIPROPIONATE 10; .64 MG/G; MG/G
CREAM TOPICAL 2 TIMES DAILY
Qty: 45 G | Refills: 2 | Status: SHIPPED | OUTPATIENT
Start: 2018-12-10 | End: 2019-08-28 | Stop reason: SDUPTHER

## 2018-12-11 ENCOUNTER — PATIENT MESSAGE (OUTPATIENT)
Dept: BARIATRICS | Facility: CLINIC | Age: 43
End: 2018-12-11

## 2018-12-18 ENCOUNTER — OFFICE VISIT (OUTPATIENT)
Dept: ORTHOPEDICS | Facility: CLINIC | Age: 43
End: 2018-12-18
Payer: MEDICAID

## 2018-12-18 VITALS
SYSTOLIC BLOOD PRESSURE: 130 MMHG | HEART RATE: 98 BPM | HEIGHT: 69 IN | BODY MASS INDEX: 43.4 KG/M2 | WEIGHT: 293 LBS | DIASTOLIC BLOOD PRESSURE: 90 MMHG

## 2018-12-18 DIAGNOSIS — M23.200 DERANGEMENT OF LATERAL MENISCUS OF RIGHT KNEE DUE TO OLD INJURY: ICD-10-CM

## 2018-12-18 DIAGNOSIS — M23.203 DERANGEMENT OF MEDIAL MENISCUS OF RIGHT KNEE DUE TO OLD INJURY: ICD-10-CM

## 2018-12-18 DIAGNOSIS — M23.203 OLD TEAR OF MEDIAL MENISCUS OF RIGHT KNEE, UNSPECIFIED TEAR TYPE: ICD-10-CM

## 2018-12-18 DIAGNOSIS — M23.201 DERANGEMENT OF LATERAL MENISCUS OF LEFT KNEE DUE TO OLD INJURY: ICD-10-CM

## 2018-12-18 DIAGNOSIS — M23.201 OLD TEAR OF LATERAL MENISCUS OF LEFT KNEE, UNSPECIFIED TEAR TYPE: ICD-10-CM

## 2018-12-18 DIAGNOSIS — M23.200 OLD TEAR OF LATERAL MENISCUS OF RIGHT KNEE, UNSPECIFIED TEAR TYPE: ICD-10-CM

## 2018-12-18 DIAGNOSIS — M23.204 OLD TEAR OF MEDIAL MENISCUS OF LEFT KNEE, UNSPECIFIED TEAR TYPE: ICD-10-CM

## 2018-12-18 DIAGNOSIS — M23.204 DERANGEMENT OF MEDIAL MENISCUS OF LEFT KNEE DUE TO OLD INJURY: Primary | ICD-10-CM

## 2018-12-18 DIAGNOSIS — M17.0 PRIMARY OSTEOARTHRITIS OF BOTH KNEES: Primary | ICD-10-CM

## 2018-12-18 PROCEDURE — 73560 X-RAY EXAM OF KNEE 1 OR 2: CPT | Mod: 50,,, | Performed by: ORTHOPAEDIC SURGERY

## 2018-12-18 PROCEDURE — 99204 OFFICE O/P NEW MOD 45 MIN: CPT | Mod: 57,25,, | Performed by: ORTHOPAEDIC SURGERY

## 2018-12-18 RX ORDER — NYSTATIN 100000 [USP'U]/G
POWDER TOPICAL 2 TIMES DAILY
Refills: 2 | COMMUNITY
Start: 2018-12-04 | End: 2019-08-28 | Stop reason: SDUPTHER

## 2018-12-18 RX ORDER — BENZOYL PEROXIDE 100 MG/ML
LIQUID TOPICAL
Refills: 1 | COMMUNITY
Start: 2018-12-03 | End: 2019-08-28 | Stop reason: SDUPTHER

## 2018-12-18 NOTE — PROGRESS NOTES
[facility]    Subjective:     Chief Complaint:   Chief Complaint   Patient presents with    Left Knee - Pain     Xray and MRI done at [facility]. Left knee pain x 10 years.     Right Knee - Pain     X ray done at [facility]. Right knee pain x 10 years       Past Medical History:   Diagnosis Date    Asthma     Knee pain        Past Surgical History:   Procedure Laterality Date     SECTION      hysterosocpy polyp removal      TUBAL LIGATION      uterin ablastion         Current Outpatient Medications   Medication Sig    albuterol (VENTOLIN HFA) 90 mcg/actuation inhaler 2 PUFFS AS NEEDED EVERY 4 HRS INHALATION    benzoyl peroxide (BENZAC AC) 10 % external wash WASH back nightly as tolerated    blood sugar diagnostic (SKYLER BLOOD GLUCOSE TEST STRIP) Strp 1 strip by Misc.(Non-Drug; Combo Route) route once daily.    blood-glucose meter kit Use as instructed    clotrimazole-betamethasone 1-0.05% (LOTRISONE) cream Apply topically 2 (two) times daily.    FLOVENT  mcg/actuation inhaler Inhale 2 puffs into the lungs 2 (two) times daily.    furosemide (LASIX) 40 MG tablet Take 1 tablet (40 mg total) by mouth once daily.    gabapentin (NEURONTIN) 600 MG tablet TAKE 1 CAPSULE BY MOUTH TID FOR CHRONIC MSK AND NEUROPATHIC PAIN    ibuprofen (ADVIL,MOTRIN) 800 MG tablet Take 1 tablet (800 mg total) by mouth every 8 (eight) hours as needed.    lancets 33 gauge Misc 1 lancet by Misc.(Non-Drug; Combo Route) route once daily.    lisinopril 10 MG tablet Take 1 tablet (10 mg total) by mouth once daily.    metFORMIN (GLUCOPHAGE) 500 MG tablet TAKE ONE TABLET BY MOUTH TWICE DAILY    nystatin (MYCOSTATIN) powder 2 (two) times daily. apply to affected area    potassium chloride (MICRO-K) 10 MEQ CpSR Take 1 capsule (10 mEq total) by mouth once daily.    QUEtiapine (SEROQUEL) 100 MG Tab Take 1 tablet (100 mg total) by mouth nightly.    SEREVENT DISKUS 50 mcg/dose diskus inhaler  Inhale 2 puffs into the lungs 2 (two) times daily.     No current facility-administered medications for this visit.        Review of patient's allergies indicates:  No Known Allergies    Family History   Family history unknown: Yes       Social History     Socioeconomic History    Marital status: Single     Spouse name: Not on file    Number of children: Not on file    Years of education: Not on file    Highest education level: Not on file   Social Needs    Financial resource strain: Not on file    Food insecurity - worry: Not on file    Food insecurity - inability: Not on file    Transportation needs - medical: Not on file    Transportation needs - non-medical: Not on file   Occupational History    Not on file   Tobacco Use    Smoking status: Former Smoker     Packs/day: 0.50     Types: Cigarettes    Smokeless tobacco: Never Used   Substance and Sexual Activity    Alcohol use: No    Drug use: No    Sexual activity: Yes     Partners: Male     Birth control/protection: None   Other Topics Concern    Not on file   Social History Narrative    Not on file       History of present illness: Patient comes in today for bilateral knee pain and instability. Both her knees her tremendously and give out. She's had her knees injected multiple times. It helped for sure. Time but the instability his which really bothering her tremendously on the left side.      Review of Systems:    Constitution: Negative for chills, fever, and sweats.  Negative for unexplained weight loss.    HENT:  Negative for headaches and blurry vision.    Cardiovascular:Negative for chest pain or irregular heart beat. Negative for hypertension.    Respiratory:  Negative for cough and shortness of breath.    Gastrointestinal: Negative for abdominal pain, heartburn, melena, nausea, and vomitting.    Genitourinary:  Negative bladder incontinence and dysuria.    Musculoskeletal:  See HPI for details.     Neurological: Negative for  numbness.    Psychiatric/Behavioral: Negative for depression.  The patient is not nervous/anxious.      Endocrine: Negative for polyuria    Hematologic/Lymphatic: Negative for bleeding problem.  Does not bruise/bleed easily.    Skin: Negative for poor would healing and rash    Objective:      Physical Examination:    Vital Signs:    Vitals:    12/18/18 0906   BP: (!) 130/90   Pulse: 98       Body mass index is 44.01 kg/m².    This a well-developed, well nourished patient in no acute distress.  They are alert and oriented and cooperative to examination.        Patient has significant crepitus in the left side. She is a positive Mariposa's in the left side for pain and a pop. Positive Mariposa's in the right side for pain and a pop. Positive bilateral crepitus.  Pertinent New Results:  Smiley of the left knee done at outside facility demonstrates a macerated flipped lateral meniscus with concurrent osteoarthritis  XRAY Report / Interpretation:   AP lateral and sunrise views of her knees demonstrate severe osteoarthrosis of her bilateral knees with bilateral 3 compartments spurring.    Assessment/Plan:      This is a 43-year-old obese patient with severe osteoarthrosis of her bilateral knees and significant meniscal pathology. She's had multiple Depo-Medrol injections. Ultimately she will need bilateral knee replacements but she's very sriram   and so we are getting an attempt to do an arthroscopic debridement bilaterally order to put off the knee replacement. Risks and benefits of discussed with this patient in great detail. She understands that ultimately she will need knee replacement. She understands that she will have some discomfort even after arthroscopy. She clearly understood and wished to proceed    This note was created using Dragon voice recognition software that occasionally misinterpreted phrases or words.

## 2018-12-20 DIAGNOSIS — J45.30 MILD PERSISTENT ASTHMA WITHOUT COMPLICATION: ICD-10-CM

## 2018-12-20 RX ORDER — GABAPENTIN 600 MG/1
TABLET ORAL
Qty: 30 TABLET | Refills: 5 | Status: SHIPPED | OUTPATIENT
Start: 2018-12-20 | End: 2019-03-27 | Stop reason: SDUPTHER

## 2018-12-20 RX ORDER — ALBUTEROL SULFATE 90 UG/1
AEROSOL, METERED RESPIRATORY (INHALATION)
Qty: 18 G | Refills: 2 | Status: SHIPPED | OUTPATIENT
Start: 2018-12-20 | End: 2019-03-27 | Stop reason: SDUPTHER

## 2018-12-20 NOTE — TELEPHONE ENCOUNTER
----- Message from Tatyana Ted sent at 12/20/2018  1:37 PM CST -----  Contact: Patient  Type:  RX Refill Request    Who Called:  Qiana, patient  Refill or New Rx:  Refill  RX Name and Strength:  gabapentin (NEURONTIN) 600 MG tablet  How is the patient currently taking it? (ex. 1XDay):  TAKE 1 CAPSULE BY MOUTH TID FOR CHRONIC MSK AND NEUROPATHIC PAIN  Is this a 30 day or 90 day RX:  30  Preferred Pharmacy with phone number:    [facility] - [place] - [address] [place] [address]  Phone: [phone] Fax: [phone]  Local or Mail Order:  Local  Ordering Provider:  Dr Blas Silva Call Back Number:  [phone]  Additional Information:  Please advise. Thanks.

## 2018-12-21 ENCOUNTER — TELEPHONE (OUTPATIENT)
Dept: PRIMARY CARE CLINIC | Facility: CLINIC | Age: 43
End: 2018-12-21

## 2018-12-21 NOTE — TELEPHONE ENCOUNTER
Spoke with pharmacist and notified that patient requested the RX to have the dispense of 30 tablets of the gabapentin

## 2018-12-21 NOTE — TELEPHONE ENCOUNTER
----- Message from Jorge Mansfield sent at 12/21/2018 11:28 AM CST -----  Type:  Pharmacy Calling to Clarify an RX    Name of Caller:  Radha  Pharmacy Name:    [facility] - [place] [address] [place] [address]  Phone: [phone] Fax: [phone]  Prescription Name:  gabapentin (NEURONTIN) 600 MG tablet       What do they need to clarify?:  90 tablets for 30 days or 30 tablets for 10 days  Best Call Back Number:   [phone]   Additional Information:

## 2019-01-07 ENCOUNTER — TELEPHONE (OUTPATIENT)
Dept: PRIMARY CARE CLINIC | Facility: CLINIC | Age: 44
End: 2019-01-07

## 2019-01-07 NOTE — TELEPHONE ENCOUNTER
----- Message from Alayna Hannon sent at 1/7/2019  1:07 PM CST -----  Contact: self  Patient is calling regarding a referral for her stomach. Patient needs to know the name of the doctor she is being referred to. Please call patient at [phone]. Thanks!

## 2019-01-08 ENCOUNTER — TELEPHONE (OUTPATIENT)
Dept: PRIMARY CARE CLINIC | Facility: CLINIC | Age: 44
End: 2019-01-08

## 2019-01-08 DIAGNOSIS — E65 PANNICULUS: Primary | ICD-10-CM

## 2019-01-08 NOTE — TELEPHONE ENCOUNTER
Please disregard message.  Patient is going to     [facility] in [place], [place]     Fax number: [phone]

## 2019-01-08 NOTE — TELEPHONE ENCOUNTER
----- Message from Boris Kelly sent at 1/8/2019  2:39 PM CST -----  Contact: same  Patient called in and stated she found someone to accept to her Medicaid.  It is [facility] in [place], [place]    Fax number: [phone]    Patient call back number is [phone]

## 2019-01-08 NOTE — TELEPHONE ENCOUNTER
----- Message from Tatayna Jean sent at 1/8/2019  1:17 PM CST -----  Contact: Patient  Type:  Patient Returning Call    Who Called:  Qiana, dian  Who Left Message for Patient:  Vikki  Does the patient know what this is regarding?:  Referral  Best Call Back Number:  [phone]  Additional Information:  Please call her back. Thanks.

## 2019-01-08 NOTE — TELEPHONE ENCOUNTER
----- Message from Tatyana Jean sent at 1/8/2019 10:23 AM CST -----  Contact: Lynn  Type: Needs Medical Advice    Who Called:  bill Kiran  Symptoms (please be specific):  N/A  How long has patient had these symptoms:  N/A  Pharmacy name and phone #:  N/A  Best Call Back Number: [phone]  Additional Information: Calling because she wants to get excesive skin removed for her abdomen, not problems with her stomach. Please call her. Thanks.

## 2019-01-08 NOTE — TELEPHONE ENCOUNTER
Spoke with patient and gave name of GI doctor recommended by Dr Odonnell. Patient states understanding and states she will call and make appt with Dr Rodríguez.

## 2019-01-10 NOTE — TELEPHONE ENCOUNTER
----- Message from Manuel Pineda sent at 1/10/2019  4:15 PM CST -----  Contact: pt  Type:  Patient Requesting Referral    Who Called:  pt  Does the patient already have the specialty appointment scheduled?:  no  If yes, what is the date of that appointment?:    Referral to What Specialty:  General surgeon  Reason for Referral:    Does the patient want the referral with a specific physician?:  no  [facility] the [facility]  Patient Requesting a Call Back?:  yes  Best Call Back Number:  [phone]  Referral Fax phone: [phone] [facility]  Referral phone: [phone]

## 2019-01-10 NOTE — TELEPHONE ENCOUNTER
----- Message from Kate Minor sent at 1/10/2019  4:27 PM CST -----  Contact: self [phone]  She is calling to request a refill of SEREVENT DISKUS 50 mcg/dose diskus inhaler.  She uses:   [facility] - [place], [place] - [address] [address]  Phone: [phone] Fax: [phone]    Thank you!

## 2019-01-11 RX ORDER — SALMETEROL XINAFOATE 50 UG/1
2 POWDER, METERED ORAL; RESPIRATORY (INHALATION) 2 TIMES DAILY
Qty: 60 EACH | Refills: 2 | Status: SHIPPED | OUTPATIENT
Start: 2019-01-11 | End: 2019-03-27 | Stop reason: SDUPTHER

## 2019-01-15 ENCOUNTER — TELEPHONE (OUTPATIENT)
Dept: PRIMARY CARE CLINIC | Facility: CLINIC | Age: 44
End: 2019-01-15

## 2019-01-15 NOTE — TELEPHONE ENCOUNTER
----- Message from Beatrice Chairez sent at 1/15/2019  4:51 PM CST -----  Contact: patient  Type: Needs Medical Advice    Who Called:  patient  Best Call Back Number: [phone]  Additional Information: patient states she is still waiting on a call from the office with an approval of the referral [facility] in [place], [place].

## 2019-01-16 ENCOUNTER — TELEPHONE (OUTPATIENT)
Dept: PLASTIC SURGERY | Facility: CLINIC | Age: 44
End: 2019-01-16

## 2019-01-22 ENCOUNTER — OFFICE VISIT (OUTPATIENT)
Dept: ORTHOPEDICS | Facility: CLINIC | Age: 44
End: 2019-01-22
Payer: MEDICAID

## 2019-01-22 VITALS
HEART RATE: 65 BPM | SYSTOLIC BLOOD PRESSURE: 132 MMHG | WEIGHT: 293 LBS | HEIGHT: 69 IN | DIASTOLIC BLOOD PRESSURE: 70 MMHG | BODY MASS INDEX: 43.4 KG/M2

## 2019-01-22 DIAGNOSIS — Z98.890 STATUS POST ARTHROSCOPY OF RIGHT KNEE: ICD-10-CM

## 2019-01-22 DIAGNOSIS — Z98.890 STATUS POST ARTHROSCOPY OF LEFT KNEE: Primary | ICD-10-CM

## 2019-01-22 PROCEDURE — 99024 POSTOP FOLLOW-UP VISIT: CPT | Mod: ,,, | Performed by: ORTHOPAEDIC SURGERY

## 2019-01-22 PROCEDURE — 99024 PR POST-OP FOLLOW-UP VISIT: ICD-10-PCS | Mod: ,,, | Performed by: ORTHOPAEDIC SURGERY

## 2019-01-22 RX ORDER — MELOXICAM 15 MG/1
15 TABLET ORAL DAILY
Refills: 3 | COMMUNITY
Start: 2019-01-16 | End: 2019-05-19 | Stop reason: SDUPTHER

## 2019-01-22 RX ORDER — ATORVASTATIN CALCIUM 20 MG/1
20 TABLET, FILM COATED ORAL NIGHTLY
Refills: 3 | COMMUNITY
Start: 2019-01-16 | End: 2019-08-28

## 2019-01-22 NOTE — PROGRESS NOTES
[facility] POST-OP NOTE    Subjective:           Chief Complaint:   Chief Complaint   Patient presents with    Left Knee - Pain     Left knee scope .. Suture removal. States that the left knee is sore. States that she is doing ok.     Right Knee - Pain     Right knee scope .. Suture removal. States that she is doing good.        Past Medical History:   Diagnosis Date    Asthma     Knee pain        Past Surgical History:   Procedure Laterality Date     SECTION      hysterosocpy polyp removal      KNEE ARTHROSCOPY      TUBAL LIGATION      uterin ablastion         Current Outpatient Medications   Medication Sig    albuterol (VENTOLIN HFA) 90 mcg/actuation inhaler USE 2puffs BY MOUTH EVERY 4 HOURS AS NEEDED    atorvastatin (LIPITOR) 20 MG tablet Take 20 mg by mouth nightly.    benzoyl peroxide (BENZAC AC) 10 % external wash WASH back nightly as tolerated    clotrimazole-betamethasone 1-0.05% (LOTRISONE) cream Apply topically 2 (two) times daily.    FLOVENT  mcg/actuation inhaler Inhale 2 puffs into the lungs 2 (two) times daily.    furosemide (LASIX) 40 MG tablet Take 1 tablet (40 mg total) by mouth once daily.    gabapentin (NEURONTIN) 600 MG tablet TAKE 1 CAPSULE BY MOUTH TID FOR CHRONIC MSK AND NEUROPATHIC PAIN    ibuprofen (ADVIL,MOTRIN) 800 MG tablet Take 1 tablet (800 mg total) by mouth every 8 (eight) hours as needed.    lisinopril 10 MG tablet Take 1 tablet (10 mg total) by mouth once daily.    meloxicam (MOBIC) 15 MG tablet Take 15 mg by mouth once daily.    metFORMIN (GLUCOPHAGE) 500 MG tablet TAKE ONE TABLET BY MOUTH TWICE DAILY    nystatin (MYCOSTATIN) powder 2 (two) times daily. apply to affected area    potassium chloride (MICRO-K) 10 MEQ CpSR Take 1 capsule (10 mEq total) by mouth once daily.    QUEtiapine (SEROQUEL) 100 MG Tab Take 1 tablet (100 mg total) by mouth nightly.    SEREVENT DISKUS 50 mcg/dose diskus inhaler Inhale 2 puffs into the  lungs 2 (two) times daily.    blood sugar diagnostic (SKYLER BLOOD GLUCOSE TEST STRIP) Strp 1 strip by Misc.(Non-Drug; Combo Route) route once daily.    blood-glucose meter kit Use as instructed    lancets 33 gauge Misc 1 lancet by Misc.(Non-Drug; Combo Route) route once daily.     No current facility-administered medications for this visit.        Review of patient's allergies indicates:  No Known Allergies    Family History   Family history unknown: Yes       Social History     Socioeconomic History    Marital status: Single     Spouse name: Not on file    Number of children: Not on file    Years of education: Not on file    Highest education level: Not on file   Social Needs    Financial resource strain: Not on file    Food insecurity - worry: Not on file    Food insecurity - inability: Not on file    Transportation needs - medical: Not on file    Transportation needs - non-medical: Not on file   Occupational History    Not on file   Tobacco Use    Smoking status: Former Smoker     Packs/day: 0.50     Types: Cigarettes    Smokeless tobacco: Never Used   Substance and Sexual Activity    Alcohol use: No    Drug use: No    Sexual activity: Yes     Partners: Male     Birth control/protection: None   Other Topics Concern    Not on file   Social History Narrative    Not on file       History of present illness: Patient comes in today status post bilateral arthroscopies of her knees. She generally doing well      Review of Systems:    Musculoskeletal:  See HPI      Objective:        Physical Examination: The wounds are clean dry and intact. Both capsule soft.    Vital Signs:    Vitals:    01/22/19 1030   BP: 132/70   Pulse: 65       Body mass index is 47.7 kg/m².    This a well-developed, well nourished patient in no acute distress.  They are alert and oriented and cooperative to examination.        See above  Pertinent New Results:    XRAY Report / Interpretation:   No new XRAYS  Today.    Assessment/Plan:      Bone-on-bone osteoarthritis of her bilateral knees. We spent a lot of time discussing weight loss. She really needs a LAP-BAND. She's doing all the right things. She is eating very well. She's losing weight. She will follow-up in a month    This note was created using Dragon voice recognition software that occasionally misinterpreted phrases or words.

## 2019-02-04 ENCOUNTER — TELEPHONE (OUTPATIENT)
Dept: PRIMARY CARE CLINIC | Facility: CLINIC | Age: 44
End: 2019-02-04

## 2019-02-04 DIAGNOSIS — M17.0 PRIMARY OSTEOARTHRITIS OF BOTH KNEES: Primary | ICD-10-CM

## 2019-02-04 NOTE — TELEPHONE ENCOUNTER
PLEASE SIGN REFERRAL    ----- Message from Kate Minor sent at 2/4/2019  9:00 AM CST -----  Contact: self [phone]  She is requesting that you issue a referral to Dr Vikram Bear.  Thank you!

## 2019-02-05 RX ORDER — POTASSIUM CHLORIDE 750 MG/1
CAPSULE, EXTENDED RELEASE ORAL
Qty: 30 CAPSULE | Refills: 5 | Status: SHIPPED | OUTPATIENT
Start: 2019-02-05 | End: 2019-08-28 | Stop reason: SDUPTHER

## 2019-02-05 NOTE — TELEPHONE ENCOUNTER
I'm sorry I have never seen this patient, please get her an appointment if you think she needs a referral. It looks like she is followed by Dr Darien Lyons.

## 2019-02-18 ENCOUNTER — TELEPHONE (OUTPATIENT)
Dept: PRIMARY CARE CLINIC | Facility: CLINIC | Age: 44
End: 2019-02-18

## 2019-02-18 NOTE — TELEPHONE ENCOUNTER
----- Message from Tatyana Jean sent at 2/18/2019  4:09 PM CST -----  Contact: Patient  Type: Needs Medical Advice    Who Called:  Qiana patient  Symptoms (please be specific):  N/A  How long has patient had these symptoms:  N/A  Pharmacy name and phone #:  N/A  Best Call Back Number: [phone]  Additional Information: Calling to inquire about referrals she requested for the General Surgeon and Dr Bear. Please advise. Thanks.

## 2019-02-21 RX ORDER — METFORMIN HYDROCHLORIDE 500 MG/1
TABLET ORAL
Qty: 60 TABLET | Refills: 2 | Status: SHIPPED | OUTPATIENT
Start: 2019-02-21 | End: 2019-05-15 | Stop reason: SDUPTHER

## 2019-02-26 ENCOUNTER — OFFICE VISIT (OUTPATIENT)
Dept: ORTHOPEDICS | Facility: CLINIC | Age: 44
End: 2019-02-26
Payer: MEDICAID

## 2019-02-26 ENCOUNTER — TELEPHONE (OUTPATIENT)
Dept: PRIMARY CARE CLINIC | Facility: CLINIC | Age: 44
End: 2019-02-26

## 2019-02-26 VITALS
HEART RATE: 64 BPM | DIASTOLIC BLOOD PRESSURE: 70 MMHG | BODY MASS INDEX: 43.4 KG/M2 | HEIGHT: 69 IN | SYSTOLIC BLOOD PRESSURE: 108 MMHG | WEIGHT: 293 LBS

## 2019-02-26 DIAGNOSIS — Z98.890 STATUS POST ARTHROSCOPY OF LEFT KNEE: Primary | ICD-10-CM

## 2019-02-26 DIAGNOSIS — Z98.890 STATUS POST ARTHROSCOPY OF RIGHT KNEE: ICD-10-CM

## 2019-02-26 PROCEDURE — 20610 DRAIN/INJ JOINT/BURSA W/O US: CPT | Mod: 58,50,, | Performed by: ORTHOPAEDIC SURGERY

## 2019-02-26 PROCEDURE — 99024 PR POST-OP FOLLOW-UP VISIT: ICD-10-PCS | Mod: ,,, | Performed by: ORTHOPAEDIC SURGERY

## 2019-02-26 PROCEDURE — 99024 POSTOP FOLLOW-UP VISIT: CPT | Mod: ,,, | Performed by: ORTHOPAEDIC SURGERY

## 2019-02-26 PROCEDURE — 20610 LARGE JOINT ASPIRATION/INJECTION: R KNEE: ICD-10-PCS | Mod: 58,50,, | Performed by: ORTHOPAEDIC SURGERY

## 2019-02-26 RX ORDER — METHYLPREDNISOLONE ACETATE 40 MG/ML
40 INJECTION, SUSPENSION INTRA-ARTICULAR; INTRALESIONAL; INTRAMUSCULAR; SOFT TISSUE
Status: DISCONTINUED | OUTPATIENT
Start: 2019-02-26 | End: 2019-02-26 | Stop reason: HOSPADM

## 2019-02-26 RX ADMIN — METHYLPREDNISOLONE ACETATE 40 MG: 40 INJECTION, SUSPENSION INTRA-ARTICULAR; INTRALESIONAL; INTRAMUSCULAR; SOFT TISSUE at 11:02

## 2019-02-26 NOTE — TELEPHONE ENCOUNTER
----- Message from Lillian Wing sent at 2/26/2019 11:44 AM CST -----  Type: Needs Medical Advice    Who Called:  Patient   Best Call Back Number: [phone]  Additional Information: Patient is requesting a return call concerning her referral to Dr. Bear, she states they have not received. Please contact to advise.

## 2019-02-26 NOTE — PROGRESS NOTES
[facility] POST-OP NOTE    Subjective:           Chief Complaint:   Chief Complaint   Patient presents with    Left Knee - Post-op Evaluation      PO Bilateral Knee scope on 19. She has minor pain    Right Knee - Post-op Evaluation       Past Medical History:   Diagnosis Date    Asthma     Diabetes mellitus, type 2     Hyperlipemia     Hypertension     Knee pain        Past Surgical History:   Procedure Laterality Date     SECTION      hysterosocpy polyp removal      KNEE ARTHROSCOPY      TUBAL LIGATION      uterin ablastion         Current Outpatient Medications   Medication Sig    albuterol (VENTOLIN HFA) 90 mcg/actuation inhaler USE 2puffs BY MOUTH EVERY 4 HOURS AS NEEDED    atorvastatin (LIPITOR) 20 MG tablet Take 20 mg by mouth nightly.    benzoyl peroxide (BENZAC AC) 10 % external wash WASH back nightly as tolerated    blood sugar diagnostic (SKYLER BLOOD GLUCOSE TEST STRIP) Strp 1 strip by Misc.(Non-Drug; Combo Route) route once daily.    blood-glucose meter kit Use as instructed    clotrimazole-betamethasone 1-0.05% (LOTRISONE) cream Apply topically 2 (two) times daily.    FLOVENT  mcg/actuation inhaler Inhale 2 puffs into the lungs 2 (two) times daily.    furosemide (LASIX) 40 MG tablet Take 1 tablet (40 mg total) by mouth once daily.    gabapentin (NEURONTIN) 600 MG tablet TAKE 1 CAPSULE BY MOUTH TID FOR CHRONIC MSK AND NEUROPATHIC PAIN    ibuprofen (ADVIL,MOTRIN) 800 MG tablet Take 1 tablet (800 mg total) by mouth every 8 (eight) hours as needed.    lancets 33 gauge Misc 1 lancet by Misc.(Non-Drug; Combo Route) route once daily.    lisinopril 10 MG tablet Take 1 tablet (10 mg total) by mouth once daily.    meloxicam (MOBIC) 15 MG tablet Take 15 mg by mouth once daily.    metFORMIN (GLUCOPHAGE) 500 MG tablet TAKE ONE TABLET BY MOUTH TWICE DAILY    nystatin (MYCOSTATIN) powder 2 (two) times daily. apply to affected area    potassium chloride  (MICRO-K) 10 MEQ CpSR TAKE ONE CAPSULE BY MOUTH ONCE DAILY    QUEtiapine (SEROQUEL) 100 MG Tab Take 1 tablet (100 mg total) by mouth nightly.    SEREVENT DISKUS 50 mcg/dose diskus inhaler Inhale 2 puffs into the lungs 2 (two) times daily.     No current facility-administered medications for this visit.        Review of patient's allergies indicates:  No Known Allergies    Family History   Family history unknown: Yes       Social History     Socioeconomic History    Marital status: Single     Spouse name: Not on file    Number of children: Not on file    Years of education: Not on file    Highest education level: Not on file   Social Needs    Financial resource strain: Not on file    Food insecurity - worry: Not on file    Food insecurity - inability: Not on file    Transportation needs - medical: Not on file    Transportation needs - non-medical: Not on file   Occupational History    Not on file   Tobacco Use    Smoking status: Former Smoker     Packs/day: 0.50     Types: Cigarettes    Smokeless tobacco: Never Used   Substance and Sexual Activity    Alcohol use: No    Drug use: No    Sexual activity: Yes     Partners: Male     Birth control/protection: None   Other Topics Concern    Not on file   Social History Narrative    Not on file       History of present illness: Patient returns today for her bilateral knees. Complains of achy pains in both her knees.      Review of Systems:    Musculoskeletal:  See HPI      Objective:        Physical Examination:    Vital Signs:    Vitals:    02/26/19 1037   BP: 108/70   Pulse: 64       Body mass index is 47.11 kg/m².    This a well-developed, well nourished patient in no acute distress.  They are alert and oriented and cooperative to examination.        Homans signs are negative bilaterally. Range of motion is specifically 0-100 degrees.  Pertinent New Results:    XRAY Report / Interpretation:   No new XRAYS Today.    Assessment/Plan:       Stable  following bilateral knee scopes. I injected both knees today with Depo-Medrol and lidocaine. I've started her on a strengthening program. She has lost a tremendous amount of weight and she continues to do so. She will follow-up in 2 months    This note was created using Dragon voice recognition software that occasionally misinterpreted phrases or words.

## 2019-02-26 NOTE — PROCEDURES
Large Joint Aspiration/Injection: R knee  Date/Time: 2/26/2019 11:36 AM  Performed by: Silver Bob MD  Authorized by: Silver Bob MD     Consent Done?:  Yes (Verbal)  Indications:  Pain  Procedure site marked: Yes    Timeout: Prior to procedure the correct patient, procedure, and site was verified      Location:  Knee  Site:  R knee  Prep: Patient was prepped and draped in usual sterile fashion    Needle size:  25 G  Medications:  40 mg methylPREDNISolone acetate 40 mg/mL; 40 mg methylPREDNISolone acetate 40 mg/mL  Patient tolerance:  Patient tolerated the procedure well with no immediate complications  Large Joint Aspiration/Injection: L knee  Date/Time: 2/26/2019 11:38 AM  Performed by: Silver Bob MD  Authorized by: Silver Bob MD     Consent Done?:  Yes (Verbal)  Indications:  Pain  Procedure site marked: Yes    Timeout: Prior to procedure the correct patient, procedure, and site was verified      Location:  Knee  Site:  L knee  Prep: Patient was prepped and draped in usual sterile fashion    Needle size:  25 G  Medications:  40 mg methylPREDNISolone acetate 40 mg/mL; 40 mg methylPREDNISolone acetate 40 mg/mL  Patient tolerance:  Patient tolerated the procedure well with no immediate complications

## 2019-03-07 DIAGNOSIS — I10 ESSENTIAL HYPERTENSION: ICD-10-CM

## 2019-03-07 RX ORDER — LISINOPRIL 10 MG/1
TABLET ORAL
Qty: 30 TABLET | Refills: 5 | Status: SHIPPED | OUTPATIENT
Start: 2019-03-07 | End: 2019-08-28 | Stop reason: SDUPTHER

## 2019-03-07 RX ORDER — FUROSEMIDE 40 MG/1
TABLET ORAL
Qty: 30 TABLET | Refills: 5 | Status: SHIPPED | OUTPATIENT
Start: 2019-03-07 | End: 2019-08-28 | Stop reason: SDUPTHER

## 2019-03-15 ENCOUNTER — TELEPHONE (OUTPATIENT)
Dept: PRIMARY CARE CLINIC | Facility: CLINIC | Age: 44
End: 2019-03-15

## 2019-03-15 NOTE — TELEPHONE ENCOUNTER
----- Message from Hermes Wagoner sent at 3/15/2019  4:51 PM CDT -----  Type: Needs Medical Advice    Who Called:  Patient  Best Call Back Number: [phone] (home)   Additional Information: Patient is calling to inquire about the clearance packet she dropped off for her Bariatric surgery - nothing has been sent or received - was dropped off 3.7 - please call to advise.

## 2019-03-18 NOTE — TELEPHONE ENCOUNTER
I have form. Filled out and awaiting signature from Dr Odonnell. As soon as it is signed it will be faxed to [phone] and I will call and notify patient as well

## 2019-03-20 ENCOUNTER — TELEPHONE (OUTPATIENT)
Dept: PRIMARY CARE CLINIC | Facility: CLINIC | Age: 44
End: 2019-03-20

## 2019-03-20 NOTE — TELEPHONE ENCOUNTER
----- Message from Tatyana Jean sent at 3/20/2019  1:19 PM CDT -----  Contact: Patient  Type:  Sooner Apoointment Request    Caller is requesting a sooner appointment.  Caller declined first available appointment listed below.  Caller will not accept being placed on the waitlist and is requesting a message be sent to doctor.    Name of Caller:  Qiana, patient  When is the first available appointment?  04/02/2019  Symptoms:  Questions regarding gastric bypass, Rx refills  Best Call Back Number:  [phone]  Additional Information:  Please call her. Thanks.

## 2019-03-21 ENCOUNTER — OFFICE VISIT (OUTPATIENT)
Dept: PRIMARY CARE CLINIC | Facility: CLINIC | Age: 44
End: 2019-03-21
Payer: MEDICAID

## 2019-03-21 VITALS
TEMPERATURE: 99 F | WEIGHT: 293 LBS | OXYGEN SATURATION: 98 % | DIASTOLIC BLOOD PRESSURE: 77 MMHG | RESPIRATION RATE: 18 BRPM | HEART RATE: 80 BPM | BODY MASS INDEX: 43.4 KG/M2 | HEIGHT: 69 IN | SYSTOLIC BLOOD PRESSURE: 122 MMHG

## 2019-03-21 DIAGNOSIS — E11.9 TYPE 2 DIABETES MELLITUS WITHOUT COMPLICATION, WITHOUT LONG-TERM CURRENT USE OF INSULIN: ICD-10-CM

## 2019-03-21 DIAGNOSIS — E65 PANNICULUS: ICD-10-CM

## 2019-03-21 DIAGNOSIS — I10 ESSENTIAL HYPERTENSION: ICD-10-CM

## 2019-03-21 DIAGNOSIS — M17.0 PRIMARY OSTEOARTHRITIS OF BOTH KNEES: ICD-10-CM

## 2019-03-21 DIAGNOSIS — E66.01 MORBID OBESITY: Primary | ICD-10-CM

## 2019-03-21 PROCEDURE — 99214 PR OFFICE/OUTPT VISIT, EST, LEVL IV, 30-39 MIN: ICD-10-PCS | Mod: S$PBB,,, | Performed by: INTERNAL MEDICINE

## 2019-03-21 PROCEDURE — 99214 OFFICE O/P EST MOD 30 MIN: CPT | Mod: S$PBB,,, | Performed by: INTERNAL MEDICINE

## 2019-03-21 PROCEDURE — 99999 PR PBB SHADOW E&M-EST. PATIENT-LVL III: CPT | Mod: PBBFAC,,, | Performed by: INTERNAL MEDICINE

## 2019-03-21 PROCEDURE — 99213 OFFICE O/P EST LOW 20 MIN: CPT | Mod: PBBFAC,PN | Performed by: INTERNAL MEDICINE

## 2019-03-21 PROCEDURE — 99999 PR PBB SHADOW E&M-EST. PATIENT-LVL III: ICD-10-PCS | Mod: PBBFAC,,, | Performed by: INTERNAL MEDICINE

## 2019-03-22 NOTE — PROGRESS NOTES
Subjective:       Patient ID: Qiana Collins is a 43 y.o. female.    Chief Complaint: Talk To MD OREILLY  patient here for followup has has been losing some weight with diet and being scheduled for lap band to lose weigh at [facility] need to fill out forms for patient to bring to surgery clinic patient also need tummy tuck since the motor her way now around her with the with sagging skin folds still with severe pain in both her knee due to severe osteoarthritis with bone-on-bone need bilateral knee replacement as soon as she can lose the weight to the weight approved by orthopedic surgeon she denies short of breath chest pain her diabetes is control no dyspnea with exertion no nocturia  Review of Systems    Objective:      Physical Exam   Constitutional: She is oriented to person, place, and time. She appears well-developed and well-nourished. No distress.   HENT:   Head: Normocephalic and atraumatic.   Right Ear: External ear normal.   Left Ear: External ear normal.   Nose: Nose normal.   Mouth/Throat: Oropharynx is clear and moist. No oropharyngeal exudate.   Eyes: Conjunctivae and EOM are normal. Pupils are equal, round, and reactive to light. Right eye exhibits no discharge. Left eye exhibits no discharge.   Neck: Normal range of motion. Neck supple. No thyromegaly present.   Cardiovascular: Normal rate, regular rhythm, normal heart sounds and intact distal pulses. Exam reveals no gallop and no friction rub.   No murmur heard.  Pulmonary/Chest: Effort normal and breath sounds normal. No respiratory distress. She has no wheezes. She has no rales. She exhibits no tenderness.   Abdominal: Soft. Bowel sounds are normal. She exhibits no distension. There is no tenderness. There is no rebound and no guarding.   Severe truncal obesity with the double sagging skin folds   Musculoskeletal: Normal range of motion. She exhibits no edema, tenderness or deformity.   Lymphadenopathy:     She has no cervical  adenopathy.   Neurological: She is alert and oriented to person, place, and time.   Skin: Skin is warm and dry. Capillary refill takes less than 2 seconds. No rash noted. No erythema.   Psychiatric: She has a normal mood and affect. Judgment and thought content normal.   Nursing note and vitals reviewed.      Assessment:       1. Morbid obesity    2. Panniculus    3. Primary osteoarthritis of both knees    4. Type 2 diabetes mellitus without complication, without long-term current use of insulin    5. Essential hypertension        Plan:       Morbid obesity  Comments:  Fill out form to sent to surgery Clinic for patient to have lap band and tummy tuck    Panniculus    Primary osteoarthritis of both knees  Comments:  A weight appropriate weight loss before orthopedist can't do replacement bilateral knees    Type 2 diabetes mellitus without complication, without long-term current use of insulin  Comments:  Control with diet and medication    Essential hypertension  Comments:  Stable on medication

## 2019-03-27 DIAGNOSIS — M17.0 PRIMARY OSTEOARTHRITIS OF BOTH KNEES: ICD-10-CM

## 2019-03-27 DIAGNOSIS — J45.30 MILD PERSISTENT ASTHMA WITHOUT COMPLICATION: ICD-10-CM

## 2019-03-27 RX ORDER — ALBUTEROL SULFATE 90 UG/1
AEROSOL, METERED RESPIRATORY (INHALATION)
Qty: 18 G | Refills: 2 | Status: SHIPPED | OUTPATIENT
Start: 2019-03-27 | End: 2019-05-07 | Stop reason: SDUPTHER

## 2019-03-27 RX ORDER — SALMETEROL XINAFOATE 50 UG/1
POWDER, METERED ORAL; RESPIRATORY (INHALATION)
Qty: 60 EACH | Refills: 2 | Status: SHIPPED | OUTPATIENT
Start: 2019-03-27 | End: 2019-08-28 | Stop reason: SDUPTHER

## 2019-03-27 RX ORDER — IBUPROFEN 800 MG/1
TABLET ORAL
Qty: 60 TABLET | Refills: 2 | Status: SHIPPED | OUTPATIENT
Start: 2019-03-27 | End: 2019-05-19 | Stop reason: SDUPTHER

## 2019-03-27 RX ORDER — GABAPENTIN 600 MG/1
TABLET ORAL
Qty: 30 TABLET | Refills: 5 | Status: SHIPPED | OUTPATIENT
Start: 2019-03-27 | End: 2019-06-27 | Stop reason: SDUPTHER

## 2019-04-01 RX ORDER — TRIAMCINOLONE ACETONIDE 5 MG/G
CREAM TOPICAL
Qty: 30 G | Refills: 0 | Status: SHIPPED | OUTPATIENT
Start: 2019-04-01 | End: 2020-09-09

## 2019-04-15 ENCOUNTER — TELEPHONE (OUTPATIENT)
Dept: ORTHOPEDICS | Facility: CLINIC | Age: 44
End: 2019-04-15

## 2019-04-15 NOTE — TELEPHONE ENCOUNTER
----- Message from Brooke Jackson sent at 4/15/2019 12:07 PM CDT -----    Type:  Sooner Apoointment Request    Caller is requesting a sooner appointment.  Caller declined first available appointment listed below.  Caller will not accept being placed on the waitlist and is requesting a message be sent to doctor.    Name of Caller:  pt  When is the first available appointment?     Several months   Symptoms:   Back  pain  Best Call Back Number: [phone]  Additional Information:   Pt in the    Has a  Ref    In  The  Computer

## 2019-04-22 ENCOUNTER — TELEPHONE (OUTPATIENT)
Dept: PRIMARY CARE CLINIC | Facility: CLINIC | Age: 44
End: 2019-04-22

## 2019-04-22 DIAGNOSIS — E66.9 OBESITY, UNSPECIFIED OBESITY SEVERITY, UNSPECIFIED OBESITY TYPE: ICD-10-CM

## 2019-04-22 DIAGNOSIS — E66.01 MORBID OBESITY: Primary | ICD-10-CM

## 2019-04-22 NOTE — TELEPHONE ENCOUNTER
----- Message from Tatyana Jean sent at 4/22/2019  2:27 PM CDT -----  Contact: Patient  Type: Needs Medical Advice    Who Called:  Qiana patient  Symptoms (please be specific):  N/A  How long has patient had these symptoms:  N/A  Pharmacy name and phone #:  N/A  Best Call Back Number: [phone]  Additional Information: Calling because Dr Pickens, Bariatric Surgeon, never did receive the referral and clinicals that was suppose to be sent. Also did the referral to Dr. Vikram Bear get sent.  Please call her. Thanks.

## 2019-04-25 ENCOUNTER — TELEPHONE (OUTPATIENT)
Dept: PRIMARY CARE CLINIC | Facility: CLINIC | Age: 44
End: 2019-04-25

## 2019-04-25 DIAGNOSIS — E66.01 MORBID OBESITY: Primary | ICD-10-CM

## 2019-04-25 NOTE — TELEPHONE ENCOUNTER
----- Message from Tatyana Jean sent at 4/25/2019  1:37 PM CDT -----  Contact: Patient  Type: Needs Medical Advice    Who Called:  Allan. patient  Symptoms (please be specific):  N/A  How long has patient had these symptoms:  N/A  Pharmacy name and phone #:  N/A  Best Call Back Number: [phone]  Additional Information: Please call her she needs to talk to you regarding her referrals. Thanks.

## 2019-04-26 NOTE — TELEPHONE ENCOUNTER
Spoke with patient in regards to general surgery referral for lap band and tummy tuck procedure. Patient was seen by PCP on 3/21/19 and referral was suppose to be faxed. Patient can't schedule appointment with Dr. Pickens until referral is faxed. Referral placed and will fax to Dr. Pickens at [phone].

## 2019-04-30 ENCOUNTER — OFFICE VISIT (OUTPATIENT)
Dept: ORTHOPEDICS | Facility: CLINIC | Age: 44
End: 2019-04-30
Payer: MEDICAID

## 2019-04-30 VITALS
SYSTOLIC BLOOD PRESSURE: 138 MMHG | BODY MASS INDEX: 43.4 KG/M2 | HEART RATE: 61 BPM | WEIGHT: 293 LBS | DIASTOLIC BLOOD PRESSURE: 82 MMHG | HEIGHT: 69 IN

## 2019-04-30 DIAGNOSIS — M17.11 PRIMARY OSTEOARTHRITIS OF RIGHT KNEE: Primary | ICD-10-CM

## 2019-04-30 DIAGNOSIS — M17.12 PRIMARY OSTEOARTHRITIS OF LEFT KNEE: ICD-10-CM

## 2019-04-30 PROCEDURE — 20610 LARGE JOINT ASPIRATION/INJECTION: R KNEE: ICD-10-PCS | Mod: 50,,, | Performed by: ORTHOPAEDIC SURGERY

## 2019-04-30 PROCEDURE — 20610 DRAIN/INJ JOINT/BURSA W/O US: CPT | Mod: 50,,, | Performed by: ORTHOPAEDIC SURGERY

## 2019-04-30 PROCEDURE — 99213 OFFICE O/P EST LOW 20 MIN: CPT | Mod: 25,,, | Performed by: ORTHOPAEDIC SURGERY

## 2019-04-30 PROCEDURE — 99213 PR OFFICE/OUTPT VISIT, EST, LEVL III, 20-29 MIN: ICD-10-PCS | Mod: 25,,, | Performed by: ORTHOPAEDIC SURGERY

## 2019-04-30 RX ORDER — METHYLPREDNISOLONE ACETATE 40 MG/ML
40 INJECTION, SUSPENSION INTRA-ARTICULAR; INTRALESIONAL; INTRAMUSCULAR; SOFT TISSUE
Status: DISCONTINUED | OUTPATIENT
Start: 2019-04-30 | End: 2019-04-30 | Stop reason: HOSPADM

## 2019-04-30 RX ADMIN — METHYLPREDNISOLONE ACETATE 40 MG: 40 INJECTION, SUSPENSION INTRA-ARTICULAR; INTRALESIONAL; INTRAMUSCULAR; SOFT TISSUE at 10:04

## 2019-04-30 NOTE — PROGRESS NOTES
[facility]    Subjective:     Chief Complaint:   Chief Complaint   Patient presents with    Left Knee - Pain     b/l knee follow up from visit 19 injections done. Injections helped about 2 weeks and then it started to feel like her knees is coming out of socket. Left is worse    Right Knee - Pain       Past Medical History:   Diagnosis Date    Asthma     Diabetes mellitus, type 2     Hyperlipemia     Hypertension     Knee pain        Past Surgical History:   Procedure Laterality Date     SECTION      hysterosocpy polyp removal      KNEE ARTHROSCOPY      TUBAL LIGATION      uterin ablastion         Current Outpatient Medications   Medication Sig    albuterol (VENTOLIN HFA) 90 mcg/actuation inhaler USE TWO puffs BY MOUTH EVERY 4 HOURS AS NEEDED    atorvastatin (LIPITOR) 20 MG tablet Take 20 mg by mouth nightly.    benzoyl peroxide (BENZAC AC) 10 % external wash WASH back nightly as tolerated    blood sugar diagnostic (SKYLER BLOOD GLUCOSE TEST STRIP) Strp 1 strip by Misc.(Non-Drug; Combo Route) route once daily.    blood-glucose meter kit Use as instructed    clotrimazole-betamethasone 1-0.05% (LOTRISONE) cream Apply topically 2 (two) times daily.    FLOVENT  mcg/actuation inhaler Inhale 2 puffs into the lungs 2 (two) times daily.    furosemide (LASIX) 40 MG tablet TAKE ONE TABLET BY MOUTH EVERY MORNING    gabapentin (NEURONTIN) 600 MG tablet TAKE ONE TABLET BY MOUTH THREE TIMES DAILY AS NEEDED nerve pain    ibuprofen (ADVIL,MOTRIN) 800 MG tablet TAKE ONE TABLET BY MOUTH EVERY 8 HOURS AS NEEDED    lancets 33 gauge Misc 1 lancet by Misc.(Non-Drug; Combo Route) route once daily.    lisinopril 10 MG tablet TAKE ONE TABLET BY MOUTH EVERY MORNING    meloxicam (MOBIC) 15 MG tablet Take 15 mg by mouth once daily.    metFORMIN (GLUCOPHAGE) 500 MG tablet TAKE ONE TABLET BY MOUTH TWICE DAILY    nystatin (MYCOSTATIN) powder 2 (two) times daily. apply to affected area     potassium chloride (MICRO-K) 10 MEQ CpSR TAKE ONE CAPSULE BY MOUTH ONCE DAILY    QUEtiapine (SEROQUEL) 100 MG Tab Take 1 tablet (100 mg total) by mouth nightly.    SEREVENT DISKUS 50 mcg/dose diskus inhaler INHALE TWO PUFFS BY MOUTH TWICE DAILY    triamcinolone acetonide 0.5% (KENALOG) 0.5 % Crea APPLY TO THE affected AREA of face TWICE DAILY     No current facility-administered medications for this visit.        Review of patient's allergies indicates:  No Known Allergies    Family History   Family history unknown: Yes       Social History     Socioeconomic History    Marital status: Single     Spouse name: Not on file    Number of children: Not on file    Years of education: Not on file    Highest education level: Not on file   Occupational History    Not on file   Social Needs    Financial resource strain: Not on file    Food insecurity:     Worry: Not on file     Inability: Not on file    Transportation needs:     Medical: Not on file     Non-medical: Not on file   Tobacco Use    Smoking status: Former Smoker     Packs/day: 0.50     Types: Cigarettes    Smokeless tobacco: Never Used   Substance and Sexual Activity    Alcohol use: No    Drug use: No    Sexual activity: Yes     Partners: Male     Birth control/protection: None   Lifestyle    Physical activity:     Days per week: Not on file     Minutes per session: Not on file    Stress: Not on file   Relationships    Social connections:     Talks on phone: Not on file     Gets together: Not on file     Attends [employer] service: Not on file     Active member of club or organization: Not on file     Attends meetings of clubs or organizations: Not on file     Relationship status: Not on file   Other Topics Concern    Not on file   Social History Narrative    Not on file       History of present illness: Patient returns for her bilateral knees. She is having a tremendous amount of pain. She is scheduled for bariatric surgery. She has lost over  100 pounds in the last year.      Review of Systems:    Constitution: Negative for chills, fever, and sweats.  Negative for unexplained weight loss.    HENT:  Negative for headaches and blurry vision.    Cardiovascular:Negative for chest pain or irregular heart beat. Negative for hypertension.    Respiratory:  Negative for cough and shortness of breath.    Gastrointestinal: Negative for abdominal pain, heartburn, melena, nausea, and vomitting.    Genitourinary:  Negative bladder incontinence and dysuria.    Musculoskeletal:  See HPI for details.     Neurological: Negative for numbness.    Psychiatric/Behavioral: Negative for depression.  The patient is not nervous/anxious.      Endocrine: Negative for polyuria    Hematologic/Lymphatic: Negative for bleeding problem.  Does not bruise/bleed easily.    Skin: Negative for poor would healing and rash    Objective:      Physical Examination:    Vital Signs:    Vitals:    04/30/19 0946   BP: 138/82   Pulse: 61       Body mass index is 46.37 kg/m².    This a well-developed, well nourished patient in no acute distress.  They are alert and oriented and cooperative to examination.        Patient is morbidly obese. She has bilateral anterior crepitus. Bilateral medial joint line tenderness.  Pertinent New Results:    XRAY Report / Interpretation:       Assessment/Plan:      Bone-on-bone arthritis bilateral knees. I injected both her knees again with Depo-Medrol and lidocaine. She will undergo LAP-BAND. She will follow-up when necessary      This note was created using Dragon voice recognition software that occasionally misinterpreted phrases or words.

## 2019-04-30 NOTE — PROCEDURES
Large Joint Aspiration/Injection: R knee  Date/Time: 4/30/2019 10:41 AM  Performed by: Silver Bob MD  Authorized by: Silver Bob MD     Consent Done?:  Yes (Verbal)  Indications:  Pain  Procedure site marked: Yes    Timeout: Prior to procedure the correct patient, procedure, and site was verified      Location:  Knee  Site:  R knee  Prep: Patient was prepped and draped in usual sterile fashion    Needle size:  25 G  Medications:  40 mg methylPREDNISolone acetate 40 mg/mL  Patient tolerance:  Patient tolerated the procedure well with no immediate complications  Large Joint Aspiration/Injection: L knee  Date/Time: 4/30/2019 10:41 AM  Performed by: Silver Bob MD  Authorized by: Silver Bob MD     Consent Done?:  Yes (Verbal)  Indications:  Pain  Procedure site marked: Yes    Timeout: Prior to procedure the correct patient, procedure, and site was verified      Location:  Knee  Site:  L knee  Prep: Patient was prepped and draped in usual sterile fashion    Needle size:  25 G  Medications:  40 mg methylPREDNISolone acetate 40 mg/mL  Patient tolerance:  Patient tolerated the procedure well with no immediate complications

## 2019-05-01 ENCOUNTER — OFFICE VISIT (OUTPATIENT)
Dept: ORTHOPEDICS | Facility: CLINIC | Age: 44
End: 2019-05-01
Payer: MEDICAID

## 2019-05-01 ENCOUNTER — TELEPHONE (OUTPATIENT)
Dept: PRIMARY CARE CLINIC | Facility: CLINIC | Age: 44
End: 2019-05-01

## 2019-05-01 VITALS
HEART RATE: 65 BPM | HEIGHT: 69 IN | WEIGHT: 293 LBS | DIASTOLIC BLOOD PRESSURE: 88 MMHG | SYSTOLIC BLOOD PRESSURE: 128 MMHG | BODY MASS INDEX: 43.4 KG/M2

## 2019-05-01 DIAGNOSIS — G89.29 CHRONIC BILATERAL LOW BACK PAIN WITHOUT SCIATICA: Primary | ICD-10-CM

## 2019-05-01 DIAGNOSIS — M54.50 CHRONIC BILATERAL LOW BACK PAIN WITHOUT SCIATICA: Primary | ICD-10-CM

## 2019-05-01 DIAGNOSIS — M51.27 LUMBOSACRAL DISC HERNIATION: Primary | ICD-10-CM

## 2019-05-01 DIAGNOSIS — M51.16 LUMBAR DISC HERNIATION WITH RADICULOPATHY: ICD-10-CM

## 2019-05-01 DIAGNOSIS — M25.512 BILATERAL SHOULDER PAIN, UNSPECIFIED CHRONICITY: ICD-10-CM

## 2019-05-01 DIAGNOSIS — M50.323 OTHER CERVICAL DISC DEGENERATION AT C6-C7 LEVEL: ICD-10-CM

## 2019-05-01 DIAGNOSIS — M25.511 BILATERAL SHOULDER PAIN, UNSPECIFIED CHRONICITY: ICD-10-CM

## 2019-05-01 DIAGNOSIS — M54.12 CERVICAL RADICULOPATHY: ICD-10-CM

## 2019-05-01 DIAGNOSIS — M50.322 OTHER CERVICAL DISC DEGENERATION AT C5-C6 LEVEL: ICD-10-CM

## 2019-05-01 PROCEDURE — 72170 PR  X-RAY PELVIS 1/2 VW: ICD-10-PCS | Mod: ,,, | Performed by: ORTHOPAEDIC SURGERY

## 2019-05-01 PROCEDURE — 72040 X-RAY EXAM NECK SPINE 2-3 VW: CPT | Mod: ,,, | Performed by: ORTHOPAEDIC SURGERY

## 2019-05-01 PROCEDURE — 99213 OFFICE O/P EST LOW 20 MIN: CPT | Mod: ,,, | Performed by: ORTHOPAEDIC SURGERY

## 2019-05-01 PROCEDURE — 99213 PR OFFICE/OUTPT VISIT, EST, LEVL III, 20-29 MIN: ICD-10-PCS | Mod: ,,, | Performed by: ORTHOPAEDIC SURGERY

## 2019-05-01 PROCEDURE — 72100 X-RAY EXAM L-S SPINE 2/3 VWS: CPT | Mod: ,,, | Performed by: ORTHOPAEDIC SURGERY

## 2019-05-01 PROCEDURE — 72170 X-RAY EXAM OF PELVIS: CPT | Mod: ,,, | Performed by: ORTHOPAEDIC SURGERY

## 2019-05-01 PROCEDURE — 72040: ICD-10-PCS | Mod: ,,, | Performed by: ORTHOPAEDIC SURGERY

## 2019-05-01 PROCEDURE — 72100 PR  X-RAY LUMBAR SPINE 2/3 VW: ICD-10-PCS | Mod: ,,, | Performed by: ORTHOPAEDIC SURGERY

## 2019-05-01 NOTE — TELEPHONE ENCOUNTER
----- Message from Taytana Ted sent at 5/1/2019  8:17 AM CDT -----  Contact: Sandy with Dr Bear's office phone [phone]  Type:  Patient Requesting Referral    Who Called:  Sandy with Dr Fernandez's  office  Does the patient already have the specialty appointment scheduled?:  Yes  If yes, what is the date of that appointment?:  Today at 10:30  Referral to What Specialty:  Orthopedic Surgeon  Reason for Referral:  Bilateral shoulder and lower back pain  Does the patient want the referral with a specific physician?:  Dr Vikram Bear  [facility]  Patient Requesting a Call Back?:  Yes  Best Call Back Number:  [phone]  Additional Information:   The previous referral is for a different reason. Please advise. Thanks.

## 2019-05-01 NOTE — PROGRESS NOTES
,Subjective:       Patient ID: Qiana Collins is a 43 y.o. female.    Chief Complaint: Pain of the Lumbar Spine (Lumbar pain x 2002 that radiates down both legs to feet with numbness and tingling. Limited sitting, standing and walking. ) and Pain of the Neck (Neck pain that radiate to left shoulder with tingling and numbness in both hands.)      History of Present Illness  This lady has a long history of chronic back pain radiating to both legs that is been present for many years she has had multiple MRIs and periodic epidural steroid injections or symptoms radiate to both legs but no bowel or bladder incontinence he has neck pain it does not radiate    Current Medications  Current Outpatient Medications   Medication Sig Dispense Refill    albuterol (VENTOLIN HFA) 90 mcg/actuation inhaler USE TWO puffs BY MOUTH EVERY 4 HOURS AS NEEDED 18 g 2    atorvastatin (LIPITOR) 20 MG tablet Take 20 mg by mouth nightly.  3    benzoyl peroxide (BENZAC AC) 10 % external wash WASH back nightly as tolerated  1    blood sugar diagnostic (SKYLER BLOOD GLUCOSE TEST STRIP) Strp 1 strip by Misc.(Non-Drug; Combo Route) route once daily. 100 strip 5    blood-glucose meter kit Use as instructed 1 each 0    clotrimazole-betamethasone 1-0.05% (LOTRISONE) cream Apply topically 2 (two) times daily. 45 g 2    FLOVENT  mcg/actuation inhaler Inhale 2 puffs into the lungs 2 (two) times daily.  0    furosemide (LASIX) 40 MG tablet TAKE ONE TABLET BY MOUTH EVERY MORNING 30 tablet 5    gabapentin (NEURONTIN) 600 MG tablet TAKE ONE TABLET BY MOUTH THREE TIMES DAILY AS NEEDED nerve pain 30 tablet 5    ibuprofen (ADVIL,MOTRIN) 800 MG tablet TAKE ONE TABLET BY MOUTH EVERY 8 HOURS AS NEEDED 60 tablet 2    lancets 33 gauge Misc 1 lancet by Misc.(Non-Drug; Combo Route) route once daily. 100 each 5    lisinopril 10 MG tablet TAKE ONE TABLET BY MOUTH EVERY MORNING 30 tablet 5    meloxicam (MOBIC) 15 MG tablet Take 15 mg by mouth once  daily.  3    metFORMIN (GLUCOPHAGE) 500 MG tablet TAKE ONE TABLET BY MOUTH TWICE DAILY 60 tablet 2    nystatin (MYCOSTATIN) powder 2 (two) times daily. apply to affected area  2    potassium chloride (MICRO-K) 10 MEQ CpSR TAKE ONE CAPSULE BY MOUTH ONCE DAILY 30 capsule 5    QUEtiapine (SEROQUEL) 100 MG Tab Take 1 tablet (100 mg total) by mouth nightly. 30 tablet 5    SEREVENT DISKUS 50 mcg/dose diskus inhaler INHALE TWO PUFFS BY MOUTH TWICE DAILY 60 each 2    triamcinolone acetonide 0.5% (KENALOG) 0.5 % Crea APPLY TO THE affected AREA of face TWICE DAILY 30 g 0     No current facility-administered medications for this visit.        Allergies  Review of patient's allergies indicates:  No Known Allergies    Past Medical History  Past Medical History:   Diagnosis Date    Asthma     Diabetes mellitus, type 2     Hyperlipemia     Hypertension     Knee pain        Surgical History  Past Surgical History:   Procedure Laterality Date     SECTION      hysterosocpy polyp removal      KNEE ARTHROSCOPY      TUBAL LIGATION      uterin ablastion         Family History:   Family History   Family history unknown: Yes       Social History:   Social History     Socioeconomic History    Marital status: Single     Spouse name: Not on file    Number of children: Not on file    Years of education: Not on file    Highest education level: Not on file   Occupational History    Not on file   Social Needs    Financial resource strain: Not on file    Food insecurity:     Worry: Not on file     Inability: Not on file    Transportation needs:     Medical: Not on file     Non-medical: Not on file   Tobacco Use    Smoking status: Former Smoker     Packs/day: 0.50     Types: Cigarettes    Smokeless tobacco: Never Used   Substance and Sexual Activity    Alcohol use: No    Drug use: No    Sexual activity: Yes     Partners: Male     Birth control/protection: None   Lifestyle    Physical activity:     Days per  week: Not on file     Minutes per session: Not on file    Stress: Not on file   Relationships    Social connections:     Talks on phone: Not on file     Gets together: Not on file     Attends Catholic service: Not on file     Active member of club or organization: Not on file     Attends meetings of clubs or organizations: Not on file     Relationship status: Not on file   Other Topics Concern    Not on file   Social History Narrative    Not on file       Hospitalization/Major Diagnostic Procedure:     Review of Systems     General/Constitutional:  Chills denies. Fatigue denies. Fever denies. Weight gain denies. Weight loss denies.    Respiratory:  Shortness of breath denies.    Cardiovascular:  Chest pain denies.    Gastrointestinal:  Constipation denies. Diarrhea denies. Nausea denies. Vomiting denies.     Hematology:  Easy bruising denies. Prolonged bleeding denies.     Genitourinary:  Frequent urination denies. Pain in lower back denies. Painful urination denies.     Musculoskeletal:  See HPI for details    Skin:  Rash denies.    Neurologic:  Dizziness denies. Gait abnormalities denies. Seizures denies. Tingling/Numbess denies.    Psychiatric:  Anxiety denies. Depressed mood denies.     Objective:   Vital Signs:   Vitals:    05/01/19 1137   BP: 128/88   Pulse: 65        Physical Exam      General Examination:     Constitutional: The patient is alert and oriented to lace person and time. Mood is pleasant.     Head/Face: Normal facial features normal eyebrows    Eyes: Normal extraocular motion bilaterally    Lungs: Respirations are equal and unlabored    Gait is coordinated.    Cardiovascular: There are no swelling or varicosities present.    Lymphatic: Negative for adenopathy    Skin: Normal    Neurological: Level of consciousness normal. Oriented to place person and time and situation    Psychiatric: Oriented to time place person and situation    Lumbar exam shows patient can stand erect has pain when doing  so this moderate tenderness in the midline over the L4-5 and L5-S1 levels in the paraspinous muscles without spasm range of motion is moderately restricted due to pain straight leg raising causes back pain cervical exam shows bilateral trapezius and posterior paraspinous muscle tenderness range of motion slightly limited Spurling's maneuver is negative    XRAY Report/ Interpretation : AP and lateral x-rays of the cervical spine were performed today. Indications neck pain. Findings: Disc space narrowing and osteophyte formation C5-6 C6-7  AP and lateral x-rays of lumbar spine will performed today. Indications low back pain. Findings: Disc degeneration L5-S1 with osteophyte formation indicative of long-standing disc degeneration  Receptor lumbar MRIs reviewed 2006 2011 2016 the L shoulder lumbar disc herniation chronic L5-S1 level to lace MRI shows disc protrusion at L445 as well significant  AP pelvis x-ray was taken today. Indications low back pain and/or hip pain. Findings AP pelvis x-ray appears to be normal with no evidence of fractures or significant degenerative disease    Assessment:       1. Lumbosacral disc herniation    2. Lumbar disc herniation with radiculopathy    3. Cervical radiculopathy    4. Other cervical disc degeneration at C5-C6 level    5. Other cervical disc degeneration at C6-C7 level        Plan:       Qiana was seen today for pain and pain.    Diagnoses and all orders for this visit:    Lumbosacral disc herniation    Lumbar disc herniation with radiculopathy  -     X-Ray Lumbar Spine Ap And Lateral  -     X-Ray Pelvis Routine AP    Cervical radiculopathy  -     X-Ray Cervical Spine AP And Lateral    Other cervical disc degeneration at C5-C6 level    Other cervical disc degeneration at C6-C7 level         No follow-ups on file.    Treatment options were discussed she does not want have a new MRI she states the symptoms are the same as they were when she had MRI study performed in 2016  therefore going on that assumption now order an interlaminar L5-S1 epidural steroid injection regards to her neck pain she has axial neck pain and disc degeneration and therefore we will order bilateral facet joint injections at C5-6 and C6-7    This note was created using Dragon voice recognition software that occasionally misinterpreted phrases or words.

## 2019-05-01 NOTE — LETTER
May 1, 2019      Darien Odonnell MD  [address] [place] [address]           [facility]  [facility]  [facility] [address]  Phone: [phone]  Fax: [phone]          Patient: Qiana Collins   MR Number: [id number]   YOB: 1975   Date of Visit: 5/1/2019       Dear Dr. Darien Odonnell:    Thank you for referring Qiana Collins to me for evaluation. Attached you will find relevant portions of my assessment and plan of care.    If you have questions, please do not hesitate to call me. I look forward to following Qiana Collins along with you.    Sincerely,    Vikram Bear MD    Enclosure  CC:  No Recipients    If you would like to receive this communication electronically, please contact [email or web address] or [phone] to request more information on [employer] access.    For providers and/or their staff who would like to refer a patient to [facility], please contact us through our one-stop-shop provider referral line, [facility], at [phone].    If you feel you have received this communication in error or would no longer like to receive these types of communications, please e-mail [email or web address]

## 2019-05-01 NOTE — TELEPHONE ENCOUNTER
----- Message from Lillian Wing sent at 5/1/2019 11:08 AM CDT -----  Type: Needs Medical Advice    Who Called:  Sandy with Dr.James Bear's office  Best Call Back Number: [phone]  Additional Information: Please contact regarding referral, patient is in the office for her scheduled appointment they are unable to see her until the new referral is received to change body part to bilateral shoulders and lower back.    Unable to reach pod.

## 2019-05-06 ENCOUNTER — TELEPHONE (OUTPATIENT)
Dept: PRIMARY CARE CLINIC | Facility: CLINIC | Age: 44
End: 2019-05-06

## 2019-05-06 NOTE — TELEPHONE ENCOUNTER
----- Message from Tatyana Jean sent at 5/6/2019  2:40 PM CDT -----  Contact: Patient  Type: Needs Medical Advice    Who Called:  Qiana patient  Symptoms (please be specific):  N/A  How long has patient had these symptoms:  N/A  Pharmacy name and phone #:  N/A  Best Call Back Number: [phone]  Additional Information: Please fax referral to Dr. Luis Pickens, fax [phone]. They are saying they never received it. Thanks.

## 2019-05-06 NOTE — TELEPHONE ENCOUNTER
----- Message from Tatyana Harrison sent at 5/6/2019  2:34 PM CDT -----  Type: Needs Medical Advice    Who Called:  self  Symptoms (please be specific):  Requesting to discuss having her referral emailed to her   How long has patient had these symptoms:  Very upset that she cannot get through to a nurse ?   Pharmacy name and phone #:     Best Call Back Number:[phone] (home)   Additional Information:  She is very frustrated

## 2019-05-06 NOTE — TELEPHONE ENCOUNTER
Spoke with patient, notified her that on 4/25/19 I faxed the referral to Dr. Pickens's office. I will re-fax it to his office now. Patient verbalized understanding.

## 2019-05-07 DIAGNOSIS — J45.30 MILD PERSISTENT ASTHMA WITHOUT COMPLICATION: ICD-10-CM

## 2019-05-07 RX ORDER — ALBUTEROL SULFATE 90 UG/1
AEROSOL, METERED RESPIRATORY (INHALATION)
Qty: 18 G | Refills: 2 | Status: SHIPPED | OUTPATIENT
Start: 2019-05-07 | End: 2019-08-28 | Stop reason: SDUPTHER

## 2019-05-15 RX ORDER — METFORMIN HYDROCHLORIDE 500 MG/1
TABLET ORAL
Qty: 60 TABLET | Refills: 2 | Status: SHIPPED | OUTPATIENT
Start: 2019-05-15 | End: 2019-08-28 | Stop reason: SDUPTHER

## 2019-05-19 DIAGNOSIS — M17.0 PRIMARY OSTEOARTHRITIS OF BOTH KNEES: ICD-10-CM

## 2019-05-19 RX ORDER — IBUPROFEN 800 MG/1
TABLET ORAL
Qty: 60 TABLET | Refills: 0 | Status: SHIPPED | OUTPATIENT
Start: 2019-05-19 | End: 2019-08-28 | Stop reason: SDUPTHER

## 2019-05-24 ENCOUNTER — TELEPHONE (OUTPATIENT)
Dept: ORTHOPEDICS | Facility: CLINIC | Age: 44
End: 2019-05-24

## 2019-05-24 NOTE — TELEPHONE ENCOUNTER
----- Message from Anastasiya Terrell sent at 5/22/2019 12:38 PM CDT -----  Contact: patient  Called she is in so much pain and they scheduled her appt 9/20/19 for her epideral for neck & back pts# [phone]

## 2019-05-29 ENCOUNTER — TELEPHONE (OUTPATIENT)
Dept: ORTHOPEDICS | Facility: CLINIC | Age: 44
End: 2019-05-29

## 2019-05-29 ENCOUNTER — OFFICE VISIT (OUTPATIENT)
Dept: ORTHOPEDICS | Facility: CLINIC | Age: 44
End: 2019-05-29
Payer: MEDICAID

## 2019-05-29 VITALS
HEIGHT: 69 IN | SYSTOLIC BLOOD PRESSURE: 136 MMHG | HEART RATE: 76 BPM | BODY MASS INDEX: 43.4 KG/M2 | DIASTOLIC BLOOD PRESSURE: 82 MMHG | WEIGHT: 293 LBS

## 2019-05-29 DIAGNOSIS — M50.323 OTHER CERVICAL DISC DEGENERATION AT C6-C7 LEVEL: ICD-10-CM

## 2019-05-29 DIAGNOSIS — M50.322 OTHER CERVICAL DISC DEGENERATION AT C5-C6 LEVEL: ICD-10-CM

## 2019-05-29 DIAGNOSIS — M51.27 LUMBOSACRAL DISC HERNIATION: Primary | ICD-10-CM

## 2019-05-29 PROCEDURE — 99213 OFFICE O/P EST LOW 20 MIN: CPT | Mod: ,,, | Performed by: ORTHOPAEDIC SURGERY

## 2019-05-29 PROCEDURE — 99213 PR OFFICE/OUTPT VISIT, EST, LEVL III, 20-29 MIN: ICD-10-PCS | Mod: ,,, | Performed by: ORTHOPAEDIC SURGERY

## 2019-05-29 RX ORDER — MELOXICAM 15 MG/1
TABLET ORAL
COMMUNITY
Start: 2019-05-23 | End: 2019-08-20

## 2019-05-29 RX ORDER — DICLOFENAC POTASSIUM 50 MG/1
50 TABLET, FILM COATED ORAL 2 TIMES DAILY
Qty: 60 TABLET | Refills: 5 | Status: SHIPPED | OUTPATIENT
Start: 2019-05-29 | End: 2019-05-29 | Stop reason: SDUPTHER

## 2019-05-29 RX ORDER — DICLOFENAC SODIUM 75 MG/1
75 TABLET, DELAYED RELEASE ORAL 2 TIMES DAILY
Refills: 1 | COMMUNITY
Start: 2019-05-09 | End: 2019-05-29

## 2019-05-29 RX ORDER — DICLOFENAC POTASSIUM 50 MG/1
50 TABLET, FILM COATED ORAL 2 TIMES DAILY
Qty: 60 TABLET | Refills: 5 | Status: SHIPPED | OUTPATIENT
Start: 2019-05-29 | End: 2019-05-29

## 2019-05-29 NOTE — TELEPHONE ENCOUNTER
----- Message from Mariel Skelton sent at 5/29/2019  2:24 PM CDT -----  Contact:  Margaret [phone]  Diclofenac 50 mg is not working another physician gave her this Rx 2 weeks ago . She needs a stronger RX

## 2019-05-29 NOTE — PROGRESS NOTES
Subjective:       Patient ID: Qiana Collins is a 44 y.o. female.    Chief Complaint: Pain of the Neck (Cervical follow up, states she was told the first available for injections was 9/20 due to insurance. Pain radiating into arms and fingers with numbness and tingling. No other treatment. Left arm is worse than right) and Pain of the Lower Back (Lumbar follow up, states the first available for injections was 9/20 due to insurance. States the pain radiates into both legs to feet with numbness and tingling. Feet are numb when she walks)      History of Present Illness  History as above. Due to the nature patients in surgery and insurance she has difficulty getting in to see a pain management doctor. She'll not be of C1 until September. She claims her neck pain radiates to both arms back pain to both legs with no bowel or bladder incontinence. Pain is on a constant daily basis.    Current Medications  Current Outpatient Medications   Medication Sig Dispense Refill    albuterol (PROVENTIL/VENTOLIN HFA) 90 mcg/actuation inhaler USE 2puffs BY MOUTH EVERY 4 HOURS AS NEEDED 18 g 2    atorvastatin (LIPITOR) 20 MG tablet Take 20 mg by mouth nightly.  3    benzoyl peroxide (BENZAC AC) 10 % external wash WASH back nightly as tolerated  1    blood sugar diagnostic (SKYLER BLOOD GLUCOSE TEST STRIP) Strp 1 strip by Misc.(Non-Drug; Combo Route) route once daily. 100 strip 5    blood-glucose meter kit Use as instructed 1 each 0    clotrimazole-betamethasone 1-0.05% (LOTRISONE) cream Apply topically 2 (two) times daily. 45 g 2    diclofenac (VOLTAREN) 75 MG EC tablet Take 75 mg by mouth 2 (two) times daily.  1    FLOVENT  mcg/actuation inhaler Inhale 2 puffs into the lungs 2 (two) times daily.  0    furosemide (LASIX) 40 MG tablet TAKE ONE TABLET BY MOUTH EVERY MORNING 30 tablet 5    gabapentin (NEURONTIN) 600 MG tablet TAKE ONE TABLET BY MOUTH THREE TIMES DAILY AS NEEDED nerve pain 30 tablet 5    ibuprofen  (ADVIL,MOTRIN) 800 MG tablet TAKE ONE TABLET BY MOUTH EVERY 8 HOURS AS NEEDED 60 tablet 0    lancets 33 gauge Misc 1 lancet by Misc.(Non-Drug; Combo Route) route once daily. 100 each 5    lisinopril 10 MG tablet TAKE ONE TABLET BY MOUTH EVERY MORNING 30 tablet 5    meloxicam (MOBIC) 15 MG tablet       metFORMIN (GLUCOPHAGE) 500 MG tablet TAKE ONE TABLET BY MOUTH TWICE DAILY 60 tablet 2    nystatin (MYCOSTATIN) powder 2 (two) times daily. apply to affected area  2    QUEtiapine (SEROQUEL) 100 MG Tab Take 1 tablet (100 mg total) by mouth nightly. 30 tablet 5    SEREVENT DISKUS 50 mcg/dose diskus inhaler INHALE TWO PUFFS BY MOUTH TWICE DAILY 60 each 2    triamcinolone acetonide 0.5% (KENALOG) 0.5 % Crea APPLY TO THE affected AREA of face TWICE DAILY 30 g 0    potassium chloride (MICRO-K) 10 MEQ CpSR TAKE ONE CAPSULE BY MOUTH ONCE DAILY 30 capsule 5     No current facility-administered medications for this visit.        Allergies  Review of patient's allergies indicates:  No Known Allergies    Past Medical History  Past Medical History:   Diagnosis Date    Asthma     Diabetes mellitus, type 2     Hyperlipemia     Hypertension     Knee pain        Surgical History  Past Surgical History:   Procedure Laterality Date     SECTION      hysterosocpy polyp removal      KNEE ARTHROSCOPY      TUBAL LIGATION      uterin ablastion         Family History:   Family History   Family history unknown: Yes       Social History:   Social History     Socioeconomic History    Marital status: Single     Spouse name: Not on file    Number of children: Not on file    Years of education: Not on file    Highest education level: Not on file   Occupational History    Not on file   Social Needs    Financial resource strain: Not on file    Food insecurity:     Worry: Not on file     Inability: Not on file    Transportation needs:     Medical: Not on file     Non-medical: Not on file   Tobacco Use    Smoking  status: Former Smoker     Packs/day: 0.50     Types: Cigarettes    Smokeless tobacco: Never Used   Substance and Sexual Activity    Alcohol use: No    Drug use: No    Sexual activity: Yes     Partners: Male     Birth control/protection: None   Lifestyle    Physical activity:     Days per week: Not on file     Minutes per session: Not on file    Stress: Not on file   Relationships    Social connections:     Talks on phone: Not on file     Gets together: Not on file     Attends Mandaeism service: Not on file     Active member of club or organization: Not on file     Attends meetings of clubs or organizations: Not on file     Relationship status: Not on file   Other Topics Concern    Not on file   Social History Narrative    Not on file       Hospitalization/Major Diagnostic Procedure:     Review of Systems     General/Constitutional:  Chills denies. Fatigue denies. Fever denies. Weight gain denies. Weight loss denies.    Respiratory:  Shortness of breath denies.    Cardiovascular:  Chest pain denies.    Gastrointestinal:  Constipation denies. Diarrhea denies. Nausea denies. Vomiting denies.     Hematology:  Easy bruising denies. Prolonged bleeding denies.     Genitourinary:  Frequent urination denies. Pain in lower back denies. Painful urination denies.     Musculoskeletal:  See HPI for details    Skin:  Rash denies.    Neurologic:  Dizziness denies. Gait abnormalities denies. Seizures denies. Tingling/Numbess denies.    Psychiatric:  Anxiety denies. Depressed mood denies.     Objective:   Vital Signs:   Vitals:    05/29/19 1048   BP: 136/82   Pulse: 76        Physical Exam      General Examination:     Constitutional: The patient is alert and oriented to lace person and time. Mood is pleasant.     Head/Face: Normal facial features normal eyebrows    Eyes: Normal extraocular motion bilaterally    Lungs: Respirations are equal and unlabored    Gait is coordinated.    Cardiovascular: There are no swelling or  varicosities present.    Lymphatic: Negative for adenopathy    Skin: Normal    Neurological: Level of consciousness normal. Oriented to place person and time and situation    Psychiatric: Oriented to time place person and situation    Thoracic exam is negative cervical exam shows moderate tenderness both paraspinous muscles and trapezius muscles no spasm or's maneuver causes neck pain. Subjective numbness C7 nerve root distribution. Motor exam normal patient is morbidly obese. Lumbar exam shows midline tenderness at the lumbosacral junction and a moderate restriction of motion. Pain with extension. Straight leg raise maneuver causes back pain. Hip and knee range of motion normal. No edema adenopathy or varicosities.  XRAY Report/ Interpretation: Prior cervical and lumbar x-rays were reviewed. Marked degenerative changes L5-S1 level. Marked degenerative changes C5-6 C6-7      Assessment:       1. Lumbosacral disc herniation    2. Other cervical disc degeneration at C5-C6 level    3. Other cervical disc degeneration at C6-C7 level        Plan:       Qiana was seen today for pain and pain.    Diagnoses and all orders for this visit:    Lumbosacral disc herniation    Other cervical disc degeneration at C5-C6 level    Other cervical disc degeneration at C6-C7 level         No follow-ups on file.    Patient has chronic pain. She should go first to the pain management doctor. I will prescribe nonnarcotic analgesics. Reviewed her  database. Should pain management injections fail she may consider surgery. At this time she is not interested in surgery. Neck hurts more than the lower back and she would have to have a 2 level cervical fusion if she has surgery    This note was created using Dragon voice recognition software that occasionally misinterpreted phrases or words.

## 2019-05-29 NOTE — TELEPHONE ENCOUNTER
----- Message from Miracle Wismean sent at 5/29/2019 12:22 PM CDT -----  Keira moran/[facility] calling regarding cataflam 50 on back order for a few months.  Please call [phone].  Please call as soon as possible.  Patient lives in [place] and will hang around until RX is changed.

## 2019-05-31 RX ORDER — QUETIAPINE FUMARATE 100 MG/1
TABLET, FILM COATED ORAL
Qty: 30 TABLET | Refills: 5 | Status: SHIPPED | OUTPATIENT
Start: 2019-05-31 | End: 2019-08-28

## 2019-06-27 DIAGNOSIS — M54.50 CHRONIC BILATERAL LOW BACK PAIN WITHOUT SCIATICA: Primary | ICD-10-CM

## 2019-06-27 DIAGNOSIS — G89.29 CHRONIC BILATERAL LOW BACK PAIN WITHOUT SCIATICA: Primary | ICD-10-CM

## 2019-06-27 RX ORDER — GABAPENTIN 600 MG/1
TABLET ORAL
Qty: 60 TABLET | Refills: 1 | Status: SHIPPED | OUTPATIENT
Start: 2019-06-27 | End: 2019-07-15 | Stop reason: SDUPTHER

## 2019-06-27 NOTE — TELEPHONE ENCOUNTER
----- Message from Blossom Chow sent at 6/27/2019 11:34 AM CDT -----  Contact: self   Type:  Patient Returning Call    Who Called: patient   Who Left Message for Patient:  n/a  Does the patient know what this is regarding?:  n/a  Best Call Back Number:  [phone] (home)

## 2019-06-27 NOTE — TELEPHONE ENCOUNTER
Informed patient Dr. Odonnell has not looked at message yet to refill medication, but when he does someone will inform her. States she hasn't had medication in three days and she is hurting and she is leaving to go back to [place] after her next appointment is over in 30-45 min. Will inform Dr. Odonnell

## 2019-06-27 NOTE — TELEPHONE ENCOUNTER
----- Message from Tatyana Jean sent at 6/27/2019  9:27 AM CDT -----  Contact: Patient  Type:  RX Refill Request    Who Called:  Qiana, patient  Refill or New Rx:  Refill  RX Name and Strength:  gabapentin (NEURONTIN) 600 MG tablet  How is the patient currently taking it? (ex. 1XDay):  TAKE ONE TABLET BY MOUTH THREE TIMES DAILY AS NEEDED nerve pain  Is this a 30 day or 90 day RX:  30  Preferred Pharmacy with phone number:    [facility] - [place], [place] - [address]  [address] [address]  Phone: [phone] Fax: [phone]  Local or Mail Order:  Local  Ordering Provider:  Dr Blas Silva Call Back Number:  [phone]  Additional Information:  Please advise. Thanks.

## 2019-06-27 NOTE — TELEPHONE ENCOUNTER
----- Message from Kate Minor sent at 6/27/2019 11:20 AM CDT -----  Contact: self [phone]  She said the pharmacy does not have it and she is on  her way to the pharmacy.  Please call her with the status.  Thank you!

## 2019-07-15 DIAGNOSIS — G89.29 CHRONIC BILATERAL LOW BACK PAIN WITHOUT SCIATICA: ICD-10-CM

## 2019-07-15 DIAGNOSIS — M54.50 CHRONIC BILATERAL LOW BACK PAIN WITHOUT SCIATICA: ICD-10-CM

## 2019-07-15 RX ORDER — GABAPENTIN 600 MG/1
TABLET ORAL
Qty: 90 TABLET | Refills: 1 | Status: SHIPPED | OUTPATIENT
Start: 2019-07-15 | End: 2019-08-21 | Stop reason: SDUPTHER

## 2019-07-15 NOTE — TELEPHONE ENCOUNTER
----- Message from Jorge Mansfield sent at 7/15/2019 11:42 AM CDT -----  Type: Needs Medical Advice    Who Called:  Patient    Pharmacy name and phone #:    [facility] - [place] - [address] [facility]  [address] [facility]  [place] [address]  Phone: [phone] Fax: [phone]      Best Call Back Number: [phone]  Additional Information: Patient states that the dosage of her gabapentin (NEURONTIN) 600 MG tablet should be 3XDay and the pharmacy states that it's 2XDay.    Patient states that the pharmacy had been trying to contact the doctor's office with no response and now she is out of medication.    Please call to advise.

## 2019-08-06 ENCOUNTER — TELEPHONE (OUTPATIENT)
Dept: ORTHOPEDICS | Facility: CLINIC | Age: 44
End: 2019-08-06

## 2019-08-06 NOTE — TELEPHONE ENCOUNTER
----- Message from Bethany Mazariegos sent at 8/6/2019  9:42 AM CDT -----  Contact: patient   Pt called and scheduled an appt to get knee injections for pain, she wants to know about how long she has to receive more.    PTS # [phone]

## 2019-08-14 ENCOUNTER — TELEPHONE (OUTPATIENT)
Dept: PRIMARY CARE CLINIC | Facility: CLINIC | Age: 44
End: 2019-08-14

## 2019-08-14 DIAGNOSIS — N30.00 ACUTE CYSTITIS WITHOUT HEMATURIA: Primary | ICD-10-CM

## 2019-08-14 NOTE — TELEPHONE ENCOUNTER
MRI scheduled for sometime next Wednesday at [facility], ordered by Dr. Borja (pain management)

## 2019-08-14 NOTE — TELEPHONE ENCOUNTER
C/o UTI symptoms earliest apt. Is wed 8/28. Asking for antibiotics. Apt. Scheduled for 8/28/19. Please advise.

## 2019-08-14 NOTE — TELEPHONE ENCOUNTER
----- Message from Kae Lundberg sent at 8/14/2019 11:27 AM CDT -----  Pt came with a paper order for an MRI cervical spine w/o contrast from  and was worried her insurance would rejected if the order didn't come from her primary doctor. Pt wasn't sure what MRI facility would accept her insurance and wanted the doctors suggestion. Pt left a copy of the order at the .

## 2019-08-15 RX ORDER — SULFAMETHOXAZOLE AND TRIMETHOPRIM 800; 160 MG/1; MG/1
1 TABLET ORAL 2 TIMES DAILY
Qty: 14 TABLET | Refills: 0 | Status: SHIPPED | OUTPATIENT
Start: 2019-08-15 | End: 2019-08-22

## 2019-08-15 NOTE — TELEPHONE ENCOUNTER
Spoke with pt. Notified that rx for Bactrim has been sent, states she will take some drops for her ear that she has at home and see if that helps.

## 2019-08-20 ENCOUNTER — OFFICE VISIT (OUTPATIENT)
Dept: ORTHOPEDICS | Facility: CLINIC | Age: 44
End: 2019-08-20
Payer: MEDICAID

## 2019-08-20 VITALS
SYSTOLIC BLOOD PRESSURE: 106 MMHG | BODY MASS INDEX: 43.4 KG/M2 | WEIGHT: 293 LBS | DIASTOLIC BLOOD PRESSURE: 70 MMHG | HEIGHT: 69 IN | HEART RATE: 76 BPM

## 2019-08-20 DIAGNOSIS — M17.12 PRIMARY OSTEOARTHRITIS OF LEFT KNEE: ICD-10-CM

## 2019-08-20 DIAGNOSIS — M17.11 PRIMARY OSTEOARTHRITIS OF RIGHT KNEE: Primary | ICD-10-CM

## 2019-08-20 PROCEDURE — 99213 OFFICE O/P EST LOW 20 MIN: CPT | Mod: 25,S$GLB,, | Performed by: ORTHOPAEDIC SURGERY

## 2019-08-20 PROCEDURE — 20610 LARGE JOINT ASPIRATION/INJECTION: R KNEE: ICD-10-PCS | Mod: 50,S$GLB,, | Performed by: ORTHOPAEDIC SURGERY

## 2019-08-20 PROCEDURE — 99213 PR OFFICE/OUTPT VISIT, EST, LEVL III, 20-29 MIN: ICD-10-PCS | Mod: 25,S$GLB,, | Performed by: ORTHOPAEDIC SURGERY

## 2019-08-20 PROCEDURE — 20610 DRAIN/INJ JOINT/BURSA W/O US: CPT | Mod: 50,S$GLB,, | Performed by: ORTHOPAEDIC SURGERY

## 2019-08-20 RX ORDER — METHYLPREDNISOLONE ACETATE 40 MG/ML
40 INJECTION, SUSPENSION INTRA-ARTICULAR; INTRALESIONAL; INTRAMUSCULAR; SOFT TISSUE
Status: DISCONTINUED | OUTPATIENT
Start: 2019-08-20 | End: 2019-08-20 | Stop reason: HOSPADM

## 2019-08-20 RX ORDER — DICLOFENAC SODIUM 75 MG/1
75 TABLET, DELAYED RELEASE ORAL 2 TIMES DAILY PRN
Refills: 1 | COMMUNITY
Start: 2019-08-14 | End: 2019-08-28 | Stop reason: SDUPTHER

## 2019-08-20 RX ADMIN — METHYLPREDNISOLONE ACETATE 40 MG: 40 INJECTION, SUSPENSION INTRA-ARTICULAR; INTRALESIONAL; INTRAMUSCULAR; SOFT TISSUE at 11:08

## 2019-08-20 NOTE — PROGRESS NOTES
[facility]    Subjective:     Chief Complaint:   Chief Complaint   Patient presents with    Left Knee - Pain     Left knee pain x a while. States that she would like to get injection in her knee again. States that she has good days and bad days.     Right Knee - Pain     Right knee pain x a while. States that she would like to get an injection in her knee today if possible. States that she has good days and bad days.        Past Medical History:   Diagnosis Date    Asthma     Diabetes mellitus, type 2     Hyperlipemia     Hypertension     Knee pain        Past Surgical History:   Procedure Laterality Date     SECTION      hysterosocpy polyp removal      KNEE ARTHROSCOPY      TUBAL LIGATION      uterin ablastion         Current Outpatient Medications   Medication Sig    albuterol (PROVENTIL/VENTOLIN HFA) 90 mcg/actuation inhaler USE 2puffs BY MOUTH EVERY 4 HOURS AS NEEDED    atorvastatin (LIPITOR) 20 MG tablet Take 20 mg by mouth nightly.    benzoyl peroxide (BENZAC AC) 10 % external wash WASH back nightly as tolerated    blood sugar diagnostic (SKYLER BLOOD GLUCOSE TEST STRIP) Strp 1 strip by Misc.(Non-Drug; Combo Route) route once daily.    blood-glucose meter kit Use as instructed    clotrimazole-betamethasone 1-0.05% (LOTRISONE) cream Apply topically 2 (two) times daily.    diclofenac (VOLTAREN) 75 MG EC tablet Take 75 mg by mouth 2 (two) times daily as needed.    FLOVENT  mcg/actuation inhaler Inhale 2 puffs into the lungs 2 (two) times daily.    furosemide (LASIX) 40 MG tablet TAKE ONE TABLET BY MOUTH EVERY MORNING    gabapentin (NEURONTIN) 600 MG tablet TAKE ONE TABLET BY MOUTH TID prn for neuropathy    ibuprofen (ADVIL,MOTRIN) 800 MG tablet TAKE ONE TABLET BY MOUTH EVERY 8 HOURS AS NEEDED    lancets 33 gauge Misc 1 lancet by Misc.(Non-Drug; Combo Route) route once daily.    lisinopril 10 MG tablet TAKE ONE TABLET BY MOUTH EVERY MORNING    metFORMIN  (GLUCOPHAGE) 500 MG tablet TAKE ONE TABLET BY MOUTH TWICE DAILY    potassium chloride (MICRO-K) 10 MEQ CpSR TAKE ONE CAPSULE BY MOUTH ONCE DAILY    QUEtiapine (SEROQUEL) 100 MG Tab TAKE ONE TABLET BY MOUTH AT BEDTIME    SEREVENT DISKUS 50 mcg/dose diskus inhaler INHALE TWO PUFFS BY MOUTH TWICE DAILY    sulfamethoxazole-trimethoprim 800-160mg (BACTRIM DS) 800-160 mg Tab Take 1 tablet by mouth 2 (two) times daily. for 7 days    triamcinolone acetonide 0.5% (KENALOG) 0.5 % Crea APPLY TO THE affected AREA of face TWICE DAILY    nystatin (MYCOSTATIN) powder 2 (two) times daily. apply to affected area     No current facility-administered medications for this visit.        Review of patient's allergies indicates:  No Known Allergies    Family History   Family history unknown: Yes       Social History     Socioeconomic History    Marital status: Single     Spouse name: Not on file    Number of children: Not on file    Years of education: Not on file    Highest education level: Not on file   Occupational History    Not on file   Social Needs    Financial resource strain: Not on file    Food insecurity:     Worry: Not on file     Inability: Not on file    Transportation needs:     Medical: Not on file     Non-medical: Not on file   Tobacco Use    Smoking status: Former Smoker     Packs/day: 0.50     Types: Cigarettes    Smokeless tobacco: Never Used   Substance and Sexual Activity    Alcohol use: No    Drug use: No    Sexual activity: Yes     Partners: Male     Birth control/protection: None   Lifestyle    Physical activity:     Days per week: Not on file     Minutes per session: Not on file    Stress: Not on file   Relationships    Social connections:     Talks on phone: Not on file     Gets together: Not on file     Attends Bahai service: Not on file     Active member of club or organization: Not on file     Attends meetings of clubs or organizations: Not on file     Relationship status: Not on  file   Other Topics Concern    Not on file   Social History Narrative    Not on file       History of present illness: Patient comes in today for bilateral knee pain. This been going on for many years. She comes in today for injections. We have had her on a significant weight loss program and in fact she's lost over 100 pounds. She is still awaiting bariatric surgery      Review of Systems:    Constitution: Negative for chills, fever, and sweats.  Negative for unexplained weight loss.    HENT:  Negative for headaches and blurry vision.    Cardiovascular:Negative for chest pain or irregular heart beat. Negative for hypertension.    Respiratory:  Negative for cough and shortness of breath.    Gastrointestinal: Negative for abdominal pain, heartburn, melena, nausea, and vomitting.    Genitourinary:  Negative bladder incontinence and dysuria.    Musculoskeletal:  See HPI for details.     Neurological: Negative for numbness.    Psychiatric/Behavioral: Negative for depression.  The patient is not nervous/anxious.      Endocrine: Negative for polyuria    Hematologic/Lymphatic: Negative for bleeding problem.  Does not bruise/bleed easily.    Skin: Negative for poor would healing and rash    Objective:      Physical Examination:    Vital Signs:    Vitals:    08/20/19 1107   BP: 106/70   Pulse: 76       Body mass index is 45.34 kg/m².    This a well-developed, well nourished patient in no acute distress.  They are alert and oriented and cooperative to examination.        Patient has range of motion 0-5 degrees bilaterally. She has varus deformities bilaterally. At this bilaterally. She is these.  Pertinent New Results:    XRAY Report / Interpretation:       Assessment/Plan:      Bone-on-bone arthritis bilateral knees. We spoke about joint replacement surgery. I want her to lose more weight first. She will follow-up in 3 months for more discussion      This note was created using Dragon voice recognition software that  occasionally misinterpreted phrases or words.

## 2019-08-20 NOTE — PROCEDURES
Large Joint Aspiration/Injection: R knee  Date/Time: 8/20/2019 11:54 AM  Performed by: Silver Bob MD  Authorized by: Silver Bob MD     Consent Done?:  Yes (Verbal)  Indications:  Pain  Procedure site marked: Yes    Timeout: Prior to procedure the correct patient, procedure, and site was verified    Anesthesia  Local anesthesia used  Anesthesia: local infiltration  Anesthetic: lidocaine 1% without epinephrine    Location:  Knee  Site:  R knee  Prep: Patient was prepped and draped in usual sterile fashion    Needle size:  25 G  Medications:  40 mg methylPREDNISolone acetate 40 mg/mL  Patient tolerance:  Patient tolerated the procedure well with no immediate complications  Large Joint Aspiration/Injection: L knee  Date/Time: 8/20/2019 11:54 AM  Performed by: Silver Bob MD  Authorized by: Silver Bob MD     Consent Done?:  Yes (Verbal)  Indications:  Pain  Procedure site marked: Yes    Timeout: Prior to procedure the correct patient, procedure, and site was verified    Anesthesia  Local anesthesia used  Anesthesia: local infiltration  Anesthetic: lidocaine 1% without epinephrine    Location:  Knee  Site:  L knee  Prep: Patient was prepped and draped in usual sterile fashion    Needle size:  25 G  Medications:  40 mg methylPREDNISolone acetate 40 mg/mL  Patient tolerance:  Patient tolerated the procedure well with no immediate complications

## 2019-08-21 ENCOUNTER — HOSPITAL ENCOUNTER (OUTPATIENT)
Dept: RADIOLOGY | Facility: HOSPITAL | Age: 44
Discharge: HOME | End: 2019-08-21
Attending: PHYSICAL MEDICINE & REHABILITATION
Payer: MEDICAID

## 2019-08-21 DIAGNOSIS — M54.50 CHRONIC BILATERAL LOW BACK PAIN WITHOUT SCIATICA: ICD-10-CM

## 2019-08-21 DIAGNOSIS — M54.2 NECK PAIN: ICD-10-CM

## 2019-08-21 DIAGNOSIS — G89.29 CHRONIC PAIN: ICD-10-CM

## 2019-08-21 DIAGNOSIS — G89.29 CHRONIC BILATERAL LOW BACK PAIN WITHOUT SCIATICA: ICD-10-CM

## 2019-08-21 PROCEDURE — 72141 MRI NECK SPINE W/O DYE: CPT | Mod: 26,,, | Performed by: RADIOLOGY

## 2019-08-21 PROCEDURE — 72141 MRI NECK SPINE W/O DYE: CPT | Mod: TC

## 2019-08-21 PROCEDURE — 72141 MRI CERVICAL SPINE WITHOUT CONTRAST: ICD-10-PCS | Mod: 26,,, | Performed by: RADIOLOGY

## 2019-08-21 RX ORDER — GABAPENTIN 600 MG/1
TABLET ORAL
Qty: 90 TABLET | Refills: 1 | Status: SHIPPED | OUTPATIENT
Start: 2019-08-21 | End: 2019-09-20 | Stop reason: SDUPTHER

## 2019-08-28 ENCOUNTER — OFFICE VISIT (OUTPATIENT)
Dept: PRIMARY CARE CLINIC | Facility: CLINIC | Age: 44
End: 2019-08-28
Payer: MEDICAID

## 2019-08-28 ENCOUNTER — CLINICAL SUPPORT (OUTPATIENT)
Dept: PRIMARY CARE CLINIC | Facility: CLINIC | Age: 44
End: 2019-08-28
Payer: MEDICAID

## 2019-08-28 VITALS
RESPIRATION RATE: 18 BRPM | HEART RATE: 75 BPM | TEMPERATURE: 99 F | DIASTOLIC BLOOD PRESSURE: 71 MMHG | OXYGEN SATURATION: 97 % | SYSTOLIC BLOOD PRESSURE: 115 MMHG | BODY MASS INDEX: 43.4 KG/M2 | HEIGHT: 69 IN | WEIGHT: 293 LBS

## 2019-08-28 DIAGNOSIS — M17.0 PRIMARY OSTEOARTHRITIS OF BOTH KNEES: ICD-10-CM

## 2019-08-28 DIAGNOSIS — L70.9 ACNE, UNSPECIFIED ACNE TYPE: ICD-10-CM

## 2019-08-28 DIAGNOSIS — Z01.419 WELL FEMALE EXAM WITH ROUTINE GYNECOLOGICAL EXAM: ICD-10-CM

## 2019-08-28 DIAGNOSIS — Z12.39 BREAST CANCER SCREENING: ICD-10-CM

## 2019-08-28 DIAGNOSIS — E11.9 TYPE 2 DIABETES MELLITUS WITHOUT COMPLICATION, WITHOUT LONG-TERM CURRENT USE OF INSULIN: ICD-10-CM

## 2019-08-28 DIAGNOSIS — H60.93 OTITIS EXTERNA OF BOTH EARS, UNSPECIFIED CHRONICITY, UNSPECIFIED TYPE: Primary | ICD-10-CM

## 2019-08-28 DIAGNOSIS — Z23 NEED FOR VACCINATION: ICD-10-CM

## 2019-08-28 DIAGNOSIS — Z20.2 EXPOSURE TO STD: ICD-10-CM

## 2019-08-28 DIAGNOSIS — E11.9 NEW ONSET TYPE 2 DIABETES MELLITUS: ICD-10-CM

## 2019-08-28 DIAGNOSIS — J45.909 ASTHMA, UNSPECIFIED ASTHMA SEVERITY, UNSPECIFIED WHETHER COMPLICATED, UNSPECIFIED WHETHER PERSISTENT: ICD-10-CM

## 2019-08-28 DIAGNOSIS — Z13.220 SCREENING FOR HYPERLIPIDEMIA: ICD-10-CM

## 2019-08-28 DIAGNOSIS — J45.30 MILD PERSISTENT ASTHMA WITHOUT COMPLICATION: ICD-10-CM

## 2019-08-28 DIAGNOSIS — B37.2 INTERTRIGINOUS CANDIDIASIS: ICD-10-CM

## 2019-08-28 DIAGNOSIS — I10 ESSENTIAL HYPERTENSION: ICD-10-CM

## 2019-08-28 LAB
BILIRUB SERPL-MCNC: ABNORMAL MG/DL
BLOOD URINE, POC: ABNORMAL
COLOR, POC UA: YELLOW
GLUCOSE UR QL STRIP: NORMAL
KETONES UR QL STRIP: ABNORMAL
LEUKOCYTE ESTERASE URINE, POC: ABNORMAL
NITRITE, POC UA: ABNORMAL
PH, POC UA: 5
PROTEIN, POC: ABNORMAL
SPECIFIC GRAVITY, POC UA: 1.02
UROBILINOGEN, POC UA: NORMAL

## 2019-08-28 PROCEDURE — 99214 OFFICE O/P EST MOD 30 MIN: CPT | Mod: PBBFAC,PN | Performed by: INTERNAL MEDICINE

## 2019-08-28 PROCEDURE — 90472 IMMUNIZATION ADMIN EACH ADD: CPT | Mod: PBBFAC,PN

## 2019-08-28 PROCEDURE — 99999 PR PBB SHADOW E&M-EST. PATIENT-LVL IV: CPT | Mod: PBBFAC,,, | Performed by: INTERNAL MEDICINE

## 2019-08-28 PROCEDURE — 90714 TD VACC NO PRESV 7 YRS+ IM: CPT | Mod: PBBFAC,PN

## 2019-08-28 PROCEDURE — 81002 URINALYSIS NONAUTO W/O SCOPE: CPT | Mod: PBBFAC,PN | Performed by: INTERNAL MEDICINE

## 2019-08-28 PROCEDURE — 99214 OFFICE O/P EST MOD 30 MIN: CPT | Mod: S$PBB,,, | Performed by: INTERNAL MEDICINE

## 2019-08-28 PROCEDURE — 87491 CHLMYD TRACH DNA AMP PROBE: CPT

## 2019-08-28 PROCEDURE — 99214 PR OFFICE/OUTPT VISIT, EST, LEVL IV, 30-39 MIN: ICD-10-PCS | Mod: S$PBB,,, | Performed by: INTERNAL MEDICINE

## 2019-08-28 PROCEDURE — 99999 PR PBB SHADOW E&M-EST. PATIENT-LVL IV: ICD-10-PCS | Mod: PBBFAC,,, | Performed by: INTERNAL MEDICINE

## 2019-08-28 RX ORDER — TRIAMCINOLONE ACETONIDE 5 MG/G
CREAM TOPICAL
Qty: 30 G | Refills: 0 | Status: CANCELLED | OUTPATIENT
Start: 2019-08-28

## 2019-08-28 RX ORDER — FUROSEMIDE 40 MG/1
40 TABLET ORAL EVERY MORNING
Qty: 30 TABLET | Refills: 5 | Status: SHIPPED | OUTPATIENT
Start: 2019-08-28 | End: 2019-09-11

## 2019-08-28 RX ORDER — DICLOFENAC SODIUM 75 MG/1
75 TABLET, DELAYED RELEASE ORAL 2 TIMES DAILY PRN
Refills: 1 | Status: CANCELLED | OUTPATIENT
Start: 2019-08-28

## 2019-08-28 RX ORDER — IBUPROFEN 800 MG/1
800 TABLET ORAL EVERY 8 HOURS PRN
Qty: 60 TABLET | Refills: 2 | Status: SHIPPED | OUTPATIENT
Start: 2019-08-28 | End: 2019-09-11

## 2019-08-28 RX ORDER — NYSTATIN 100000 [USP'U]/G
POWDER TOPICAL 2 TIMES DAILY
Qty: 120 G | Refills: 2 | Status: SHIPPED | OUTPATIENT
Start: 2019-08-28 | End: 2019-09-26 | Stop reason: SDUPTHER

## 2019-08-28 RX ORDER — DEXAMETHASONE 4 MG/1
2 TABLET ORAL 2 TIMES DAILY
Qty: 12 G | Refills: 0 | Status: SHIPPED | OUTPATIENT
Start: 2019-08-28 | End: 2020-02-17 | Stop reason: SDUPTHER

## 2019-08-28 RX ORDER — POTASSIUM CHLORIDE 750 MG/1
10 CAPSULE, EXTENDED RELEASE ORAL DAILY
Qty: 30 CAPSULE | Refills: 5 | Status: SHIPPED | OUTPATIENT
Start: 2019-08-28 | End: 2019-09-11

## 2019-08-28 RX ORDER — INSULIN PUMP SYRINGE, 3 ML
EACH MISCELLANEOUS
Qty: 1 EACH | Refills: 0 | Status: SHIPPED | OUTPATIENT
Start: 2019-08-28 | End: 2020-09-09

## 2019-08-28 RX ORDER — ACETAMINOPHEN, DIPHENHYDRAMINE HCL, PHENYLEPHRINE HCL 325; 25; 5 MG/1; MG/1; MG/1
1 TABLET ORAL NIGHTLY
Status: ON HOLD | COMMUNITY
End: 2020-08-25

## 2019-08-28 RX ORDER — BENZOYL PEROXIDE 100 MG/ML
LIQUID TOPICAL
Qty: 142 G | Refills: 1 | Status: SHIPPED | OUTPATIENT
Start: 2019-08-28 | End: 2020-08-04 | Stop reason: SDUPTHER

## 2019-08-28 RX ORDER — LISINOPRIL 10 MG/1
10 TABLET ORAL EVERY MORNING
Qty: 30 TABLET | Refills: 5 | Status: SHIPPED | OUTPATIENT
Start: 2019-08-28 | End: 2019-09-11

## 2019-08-28 RX ORDER — SALMETEROL XINAFOATE 50 UG/1
2 POWDER, METERED ORAL; RESPIRATORY (INHALATION) 2 TIMES DAILY
Qty: 60 EACH | Refills: 2 | Status: SHIPPED | OUTPATIENT
Start: 2019-08-28 | End: 2019-09-11

## 2019-08-28 RX ORDER — METFORMIN HYDROCHLORIDE 500 MG/1
500 TABLET ORAL 2 TIMES DAILY
Qty: 60 TABLET | Refills: 2 | Status: SHIPPED | OUTPATIENT
Start: 2019-08-28 | End: 2019-09-11

## 2019-08-28 RX ORDER — LANCETS 33 GAUGE
1 EACH MISCELLANEOUS DAILY
Qty: 100 EACH | Refills: 5 | Status: SHIPPED | OUTPATIENT
Start: 2019-08-28 | End: 2022-06-20

## 2019-08-28 RX ORDER — CLOTRIMAZOLE AND BETAMETHASONE DIPROPIONATE 10; .64 MG/G; MG/G
CREAM TOPICAL 2 TIMES DAILY
Qty: 45 G | Refills: 2 | Status: SHIPPED | OUTPATIENT
Start: 2019-08-28 | End: 2019-09-11

## 2019-08-28 RX ORDER — ALBUTEROL SULFATE 90 UG/1
AEROSOL, METERED RESPIRATORY (INHALATION)
Qty: 18 G | Refills: 2 | Status: SHIPPED | OUTPATIENT
Start: 2019-08-28 | End: 2019-12-18

## 2019-08-28 RX ORDER — NEOMYCIN SULFATE, POLYMYXIN B SULFATE, HYDROCORTISONE 3.5; 10000; 1 MG/ML; [USP'U]/ML; MG/ML
3 SOLUTION/ DROPS AURICULAR (OTIC) 3 TIMES DAILY
Qty: 10 ML | Refills: 0 | Status: SHIPPED | OUTPATIENT
Start: 2019-08-28 | End: 2020-08-04 | Stop reason: SDUPTHER

## 2019-08-28 NOTE — PROGRESS NOTES
"Subjective:       Patient ID: Qiana Collins is a 44 y.o. female.    Chief Complaint: Exposure to STD (wants to be tested state, "caught bf cheating"); Otalgia (both sides finished bactrim last week); and Medication Refill    HPI  Pt states want to be chacked for STD her BF chaeting on her still with chronic bilateral knee pain worsening by obesitty pt scheduled for Gastric sleeve middle September aslo c/o bilateral ear tenderness had 7 days po abx not better  Review of Systems    Objective:      Physical Exam   Constitutional: She is oriented to person, place, and time. She appears well-developed and well-nourished. No distress.   overwt   HENT:   Head: Normocephalic and atraumatic.   Nose: Nose normal.   Mouth/Throat: Oropharynx is clear and moist. No oropharyngeal exudate.   Bilateral ear canals mild erythema tender with traction   Eyes: Pupils are equal, round, and reactive to light. Conjunctivae and EOM are normal. Right eye exhibits no discharge. Left eye exhibits no discharge.   Neck: Normal range of motion. Neck supple. No thyromegaly present.   Cardiovascular: Normal rate, regular rhythm, normal heart sounds and intact distal pulses. Exam reveals no gallop and no friction rub.   No murmur heard.  Pulmonary/Chest: Effort normal and breath sounds normal. No respiratory distress. She has no wheezes. She has no rales. She exhibits no tenderness.   Abdominal: Soft. Bowel sounds are normal. She exhibits no distension. There is no tenderness. There is no rebound and no guarding.   Musculoskeletal: Normal range of motion. She exhibits no edema, tenderness or deformity.   Lymphadenopathy:     She has no cervical adenopathy.   Neurological: She is alert and oriented to person, place, and time.   Skin: Skin is warm and dry. Capillary refill takes less than 2 seconds. No rash noted. No erythema.   Psychiatric: She has a normal mood and affect. Judgment and thought content normal.   Nursing note and vitals " reviewed.      Assessment:       1. Otitis externa of both ears, unspecified chronicity, unspecified type    2. Screening for hyperlipidemia    3. Breast cancer screening    4. Well female exam with routine gynecological exam    5. Need for vaccination    6. Type 2 diabetes mellitus without complication, without long-term current use of insulin    7. Mild persistent asthma without complication    8. New onset type 2 diabetes mellitus    9. Intertriginous candidiasis    10. Essential hypertension    11. Primary osteoarthritis of both knees    12. Asthma, unspecified asthma severity, unspecified whether complicated, unspecified whether persistent    13. Exposure to STD    14. Acne, unspecified acne type        Plan:       Otitis externa of both ears, unspecified chronicity, unspecified type  -     neomycin-polymyxin-hydrocortisone (CORTISPORIN) otic solution; Place 3 drops into both ears 3 (three) times daily.  Dispense: 10 mL; Refill: 0    Screening for hyperlipidemia  -     Lipid panel; Future; Expected date: 08/28/2019    Breast cancer screening  -     Mammo Digital Screening Bilat without CA; Future; Expected date: 08/28/2019    Well female exam with routine gynecological exam  -     Ambulatory referral to Obstetrics / Gynecology    Need for vaccination  -     Td Vaccine (Adult) - Preservative Free  -     Pneumococcal Polysaccharide Vaccine (23 Valent) (SQ/IM)    Type 2 diabetes mellitus without complication, without long-term current use of insulin  -     Hemoglobin A1c; Future; Expected date: 08/28/2019  -      DIABETES FOOT EXAM  -     metFORMIN (GLUCOPHAGE) 500 MG tablet; Take 1 tablet (500 mg total) by mouth 2 (two) times daily.  Dispense: 60 tablet; Refill: 2    Mild persistent asthma without complication  -     albuterol (PROVENTIL/VENTOLIN HFA) 90 mcg/actuation inhaler; USE 2puffs BY MOUTH EVERY 4 HOURS AS NEEDED  Dispense: 18 g; Refill: 2    New onset type 2 diabetes mellitus  -     blood sugar  diagnostic (SKYLER BLOOD GLUCOSE TEST STRIP) Strp; 1 strip by Misc.(Non-Drug; Combo Route) route once daily.  Dispense: 100 strip; Refill: 5  -     blood-glucose meter kit; Use as instructed  Dispense: 1 each; Refill: 0  -     lancets 33 gauge Misc; 1 lancet by Misc.(Non-Drug; Combo Route) route once daily.  Dispense: 100 each; Refill: 5    Intertriginous candidiasis  Comments:  abdominal skin folds  Orders:  -     clotrimazole-betamethasone 1-0.05% (LOTRISONE) cream; Apply topically 2 (two) times daily.  Dispense: 45 g; Refill: 2  -     nystatin (MYCOSTATIN) powder; Apply topically 2 (two) times daily. apply to affected area  Dispense: 120 g; Refill: 2    Essential hypertension  -     furosemide (LASIX) 40 MG tablet; Take 1 tablet (40 mg total) by mouth every morning.  Dispense: 30 tablet; Refill: 5  -     lisinopril 10 MG tablet; Take 1 tablet (10 mg total) by mouth every morning.  Dispense: 30 tablet; Refill: 5  -     potassium chloride (MICRO-K) 10 MEQ CpSR; Take 1 capsule (10 mEq total) by mouth once daily.  Dispense: 30 capsule; Refill: 5    Primary osteoarthritis of both knees  Comments:  Inject both knee with 1 cc Kenalog 1 cc xylocaine patient tolerated procedure well  Orders:  -     ibuprofen (ADVIL,MOTRIN) 800 MG tablet; Take 1 tablet (800 mg total) by mouth every 8 (eight) hours as needed.  Dispense: 60 tablet; Refill: 2    Asthma, unspecified asthma severity, unspecified whether complicated, unspecified whether persistent  -     FLOVENT  mcg/actuation inhaler; Inhale 2 puffs into the lungs 2 (two) times daily.  Dispense: 12 g; Refill: 0  -     SEREVENT DISKUS 50 mcg/dose diskus inhaler; Inhale 2 puffs into the lungs 2 (two) times daily. Controller  Dispense: 60 each; Refill: 2    Exposure to STD  -     C. trachomatis/N. gonorrhoeae by AMP DNA [facility]; Urine; Future; Expected date: 08/28/2019  -     POCT URINE DIPSTICK WITHOUT MICROSCOPE    Acne, unspecified acne type  -     benzoyl peroxide  (BENZAC AC) 10 % external wash; WASH back nightly as tolerated  Dispense: 142 g; Refill: 1

## 2019-08-28 NOTE — PROGRESS NOTES
Verified pt ID using name and . NKDA. Administered Td Vaccine IM in L. Deltoid, and Pneumo 23 IM in R. Deltoid per physician order using aseptic technique. Aspirated and no blood return noted. Pt tolerated well with no adverse reactions noted.

## 2019-08-29 ENCOUNTER — TELEPHONE (OUTPATIENT)
Dept: PRIMARY CARE CLINIC | Facility: CLINIC | Age: 44
End: 2019-08-29

## 2019-08-29 LAB
C TRACH DNA SPEC QL NAA+PROBE: NOT DETECTED
N GONORRHOEA DNA SPEC QL NAA+PROBE: NOT DETECTED

## 2019-08-29 NOTE — TELEPHONE ENCOUNTER
Spoke with Martha at [facility] states serevent was sent over as 2 puff bid but should be 1 puff bid as maintenance. Advised her it is okay to change to 1 puff bid. States understanding

## 2019-08-29 NOTE — TELEPHONE ENCOUNTER
----- Message from Elvia Gorman sent at 8/29/2019 11:38 AM CDT -----  Contact: Martha with [facility] [phone]   Pharmacy is calling to clarify an RX.  RX name:  SEREVENT DISKUS 50 mcg/dose diskus inhaler & albuterol (PROVENTIL/VENTOLIN HFA) 90 mcg/actuation inhaler  What do they need to clarify:  Directions and duplication  Comments:

## 2019-09-10 DIAGNOSIS — J45.30 MILD PERSISTENT ASTHMA WITHOUT COMPLICATION: ICD-10-CM

## 2019-09-10 DIAGNOSIS — I10 ESSENTIAL HYPERTENSION: ICD-10-CM

## 2019-09-10 DIAGNOSIS — B37.2 INTERTRIGINOUS CANDIDIASIS: ICD-10-CM

## 2019-09-10 DIAGNOSIS — M17.0 PRIMARY OSTEOARTHRITIS OF BOTH KNEES: ICD-10-CM

## 2019-09-11 RX ORDER — METFORMIN HYDROCHLORIDE 500 MG/1
TABLET ORAL
Qty: 60 TABLET | Refills: 2 | Status: SHIPPED | OUTPATIENT
Start: 2019-09-11 | End: 2020-01-02

## 2019-09-11 RX ORDER — FUROSEMIDE 40 MG/1
TABLET ORAL
Qty: 30 TABLET | Refills: 5 | Status: SHIPPED | OUTPATIENT
Start: 2019-09-11 | End: 2020-02-17 | Stop reason: SDUPTHER

## 2019-09-11 RX ORDER — LISINOPRIL 10 MG/1
TABLET ORAL
Qty: 30 TABLET | Refills: 5 | Status: SHIPPED | OUTPATIENT
Start: 2019-09-11 | End: 2020-02-17 | Stop reason: SDUPTHER

## 2019-09-11 RX ORDER — POTASSIUM CHLORIDE 750 MG/1
CAPSULE, EXTENDED RELEASE ORAL
Qty: 30 CAPSULE | Refills: 5 | Status: SHIPPED | OUTPATIENT
Start: 2019-09-11 | End: 2020-02-17 | Stop reason: SDUPTHER

## 2019-09-11 RX ORDER — SALMETEROL XINAFOATE 50 UG/1
POWDER, METERED ORAL; RESPIRATORY (INHALATION)
Qty: 60 EACH | Refills: 1 | Status: SHIPPED | OUTPATIENT
Start: 2019-09-11 | End: 2019-11-12 | Stop reason: SDUPTHER

## 2019-09-11 RX ORDER — CLOTRIMAZOLE AND BETAMETHASONE DIPROPIONATE 10; .64 MG/G; MG/G
CREAM TOPICAL
Qty: 45 G | Refills: 2 | Status: SHIPPED | OUTPATIENT
Start: 2019-09-11 | End: 2020-05-28 | Stop reason: SDUPTHER

## 2019-09-11 RX ORDER — ALBUTEROL SULFATE 90 UG/1
AEROSOL, METERED RESPIRATORY (INHALATION)
Qty: 18 G | Refills: 2 | Status: SHIPPED | OUTPATIENT
Start: 2019-09-11 | End: 2019-12-18 | Stop reason: SDUPTHER

## 2019-09-11 RX ORDER — IBUPROFEN 800 MG/1
TABLET ORAL
Qty: 60 TABLET | Refills: 0 | Status: SHIPPED | OUTPATIENT
Start: 2019-09-11 | End: 2020-02-17 | Stop reason: ALTCHOICE

## 2019-09-20 DIAGNOSIS — M54.50 CHRONIC BILATERAL LOW BACK PAIN WITHOUT SCIATICA: ICD-10-CM

## 2019-09-20 DIAGNOSIS — G89.29 CHRONIC BILATERAL LOW BACK PAIN WITHOUT SCIATICA: ICD-10-CM

## 2019-09-20 RX ORDER — GABAPENTIN 600 MG/1
TABLET ORAL
Qty: 90 TABLET | Refills: 1 | Status: SHIPPED | OUTPATIENT
Start: 2019-09-20 | End: 2019-11-06 | Stop reason: SDUPTHER

## 2019-09-26 DIAGNOSIS — B37.2 INTERTRIGINOUS CANDIDIASIS: ICD-10-CM

## 2019-09-27 RX ORDER — NYSTATIN 100000 [USP'U]/G
POWDER TOPICAL
Qty: 60 G | Refills: 1 | Status: SHIPPED | OUTPATIENT
Start: 2019-09-27 | End: 2020-05-28 | Stop reason: SDUPTHER

## 2019-11-06 DIAGNOSIS — M54.50 CHRONIC BILATERAL LOW BACK PAIN WITHOUT SCIATICA: ICD-10-CM

## 2019-11-06 DIAGNOSIS — G89.29 CHRONIC BILATERAL LOW BACK PAIN WITHOUT SCIATICA: ICD-10-CM

## 2019-11-06 RX ORDER — GABAPENTIN 600 MG/1
TABLET ORAL
Qty: 90 TABLET | Refills: 1 | Status: SHIPPED | OUTPATIENT
Start: 2019-11-06 | End: 2020-02-17 | Stop reason: SDUPTHER

## 2019-11-12 RX ORDER — SALMETEROL XINAFOATE 50 UG/1
POWDER, METERED ORAL; RESPIRATORY (INHALATION)
Qty: 60 EACH | Refills: 4 | Status: SHIPPED | OUTPATIENT
Start: 2019-11-12 | End: 2019-12-04 | Stop reason: SDUPTHER

## 2019-11-19 ENCOUNTER — OFFICE VISIT (OUTPATIENT)
Dept: ORTHOPEDICS | Facility: CLINIC | Age: 44
End: 2019-11-19
Payer: MEDICAID

## 2019-11-19 VITALS — SYSTOLIC BLOOD PRESSURE: 160 MMHG | WEIGHT: 293 LBS | DIASTOLIC BLOOD PRESSURE: 90 MMHG | BODY MASS INDEX: 44.01 KG/M2

## 2019-11-19 DIAGNOSIS — M17.11 PRIMARY OSTEOARTHRITIS OF RIGHT KNEE: Primary | ICD-10-CM

## 2019-11-19 DIAGNOSIS — M17.12 PRIMARY OSTEOARTHRITIS OF LEFT KNEE: ICD-10-CM

## 2019-11-19 PROCEDURE — 99213 PR OFFICE/OUTPT VISIT, EST, LEVL III, 20-29 MIN: ICD-10-PCS | Mod: 25,S$GLB,, | Performed by: ORTHOPAEDIC SURGERY

## 2019-11-19 PROCEDURE — 20610 LARGE JOINT ASPIRATION/INJECTION: R KNEE: ICD-10-PCS | Mod: 50,S$GLB,, | Performed by: ORTHOPAEDIC SURGERY

## 2019-11-19 PROCEDURE — 20610 DRAIN/INJ JOINT/BURSA W/O US: CPT | Mod: 50,S$GLB,, | Performed by: ORTHOPAEDIC SURGERY

## 2019-11-19 PROCEDURE — 99213 OFFICE O/P EST LOW 20 MIN: CPT | Mod: 25,S$GLB,, | Performed by: ORTHOPAEDIC SURGERY

## 2019-11-19 RX ORDER — METHYLPREDNISOLONE ACETATE 40 MG/ML
40 INJECTION, SUSPENSION INTRA-ARTICULAR; INTRALESIONAL; INTRAMUSCULAR; SOFT TISSUE
Status: DISCONTINUED | OUTPATIENT
Start: 2019-11-19 | End: 2019-11-19 | Stop reason: HOSPADM

## 2019-11-19 RX ORDER — CELECOXIB 200 MG/1
CAPSULE ORAL
Refills: 1 | COMMUNITY
Start: 2019-10-21 | End: 2020-02-17 | Stop reason: SDUPTHER

## 2019-11-19 RX ADMIN — METHYLPREDNISOLONE ACETATE 40 MG: 40 INJECTION, SUSPENSION INTRA-ARTICULAR; INTRALESIONAL; INTRAMUSCULAR; SOFT TISSUE at 10:11

## 2019-11-19 NOTE — PROCEDURES
Large Joint Aspiration/Injection: R knee  Date/Time: 11/19/2019 10:30 AM  Performed by: Silver Bob MD  Authorized by: Silver Bob MD     Consent Done?:  Yes (Verbal)  Indications:  Pain  Procedure site marked: Yes    Timeout: Prior to procedure the correct patient, procedure, and site was verified    Anesthesia  Local anesthesia used  Anesthesia: local infiltration  Anesthetic: lidocaine 1% without epinephrine    Location:  Knee  Site:  R knee  Prep: Patient was prepped and draped in usual sterile fashion    Needle size:  25 G  Medications:  40 mg methylPREDNISolone acetate 40 mg/mL; 40 mg methylPREDNISolone acetate 40 mg/mL  Patient tolerance:  Patient tolerated the procedure well with no immediate complications  Large Joint Aspiration/Injection: L knee  Date/Time: 11/19/2019 10:30 AM  Performed by: Silver Bob MD  Authorized by: Silver Bob MD     Consent Done?:  Yes (Verbal)  Indications:  Pain  Procedure site marked: Yes    Timeout: Prior to procedure the correct patient, procedure, and site was verified    Anesthesia  Local anesthesia used  Anesthesia: local infiltration  Anesthetic: lidocaine 1% without epinephrine    Location:  Knee  Site:  L knee  Prep: Patient was prepped and draped in usual sterile fashion    Needle size:  25 G  Medications:  40 mg methylPREDNISolone acetate 40 mg/mL; 40 mg methylPREDNISolone acetate 40 mg/mL  Patient tolerance:  Patient tolerated the procedure well with no immediate complications

## 2019-11-19 NOTE — PROGRESS NOTES
[facility]    Subjective:     Chief Complaint:   Chief Complaint   Patient presents with    Left Knee - Pain     Bilateral knee injections 8-20. She is for injections again.    Right Knee - Pain       Past Medical History:   Diagnosis Date    Asthma     Bone spur of other site     Diabetes mellitus, type 2     Hyperlipemia     Hypertension     Knee pain        Past Surgical History:   Procedure Laterality Date     SECTION      x2     hysterosocpy polyp removal      KNEE ARTHROSCOPY      TUBAL LIGATION      uterin ablastion         Current Outpatient Medications   Medication Sig    albuterol (PROVENTIL/VENTOLIN HFA) 90 mcg/actuation inhaler USE 2puffs BY MOUTH EVERY 4 HOURS AS NEEDED    albuterol (PROVENTIL/VENTOLIN HFA) 90 mcg/actuation inhaler USE 2puffs BY MOUTH EVERY 4 HOURS AS NEEDED    benzoyl peroxide (BENZAC AC) 10 % external wash WASH back nightly as tolerated    blood sugar diagnostic (SKYLER BLOOD GLUCOSE TEST STRIP) Strp 1 strip by Misc.(Non-Drug; Combo Route) route once daily.    blood-glucose meter kit Use as instructed    celecoxib (CELEBREX) 200 MG capsule     clotrimazole-betamethasone 1-0.05% (LOTRISONE) cream APPLY topically TWICE DAILY    FLOVENT  mcg/actuation inhaler Inhale 2 puffs into the lungs 2 (two) times daily.    furosemide (LASIX) 40 MG tablet TAKE ONE TABLET BY MOUTH EVERY MORNING    gabapentin (NEURONTIN) 600 MG tablet TAKE 1 TABLET BY MOUTH 3 TIMES DAILY AS NEEDED FOR NERVE PAIN    ibuprofen (ADVIL,MOTRIN) 800 MG tablet TAKE ONE TABLET BY MOUTH EVERY 8 HOURS AS NEEDED    lancets 33 gauge Misc 1 lancet by Misc.(Non-Drug; Combo Route) route once daily.    lisinopril 10 MG tablet TAKE ONE TABLET BY MOUTH EVERY MORNING    melatonin 10 mg Tab Take 1 tablet by mouth every evening.    metFORMIN (GLUCOPHAGE) 500 MG tablet TAKE ONE TABLET BY MOUTH TWICE DAILY    neomycin-polymyxin-hydrocortisone (CORTISPORIN) otic solution Place 3 drops  into both ears 3 (three) times daily.    nystatin (MYCOSTATIN) powder APPLY TO THE affected AREA TWICE DAILY    potassium chloride (MICRO-K) 10 MEQ CpSR TAKE ONE CAPSULE BY MOUTH ONCE DAILY    SEREVENT DISKUS 50 mcg/dose diskus inhaler inhale 2puffs BY MOUTH TWICE DAILY    triamcinolone acetonide 0.5% (KENALOG) 0.5 % Crea APPLY TO THE affected AREA of face TWICE DAILY     No current facility-administered medications for this visit.        Review of patient's allergies indicates:  No Known Allergies    Family History   Problem Relation Age of Onset    Diabetes Mother     Dementia Mother     Diabetes Father     Heart disease Father        Social History     Socioeconomic History    Marital status: Single     Spouse name: Not on file    Number of children: Not on file    Years of education: Not on file    Highest education level: Not on file   Occupational History    Not on file   Social Needs    Financial resource strain: Not on file    Food insecurity:     Worry: Not on file     Inability: Not on file    Transportation needs:     Medical: Not on file     Non-medical: Not on file   Tobacco Use    Smoking status: Former Smoker     Packs/day: 0.50     Types: Cigarettes    Smokeless tobacco: Former User   Substance and Sexual Activity    Alcohol use: No    Drug use: Yes     Types: Marijuana    Sexual activity: Yes     Partners: Male     Birth control/protection: None   Lifestyle    Physical activity:     Days per week: Not on file     Minutes per session: Not on file    Stress: Not on file   Relationships    Social connections:     Talks on phone: Not on file     Gets together: Not on file     Attends Druze service: Not on file     Active member of club or organization: Not on file     Attends meetings of clubs or organizations: Not on file     Relationship status: Not on file   Other Topics Concern    Not on file   Social History Narrative    Not on file       History of present illness:   Patient comes in today for the bilateral knees.  Both knees are bothering her tremendously.  She has lost over 100 lb.  She is scheduled for bariatric surgery in January.      Review of Systems:    Constitution: Negative for chills, fever, and sweats.  Negative for unexplained weight loss.    HENT:  Negative for headaches and blurry vision.    Cardiovascular:Negative for chest pain or irregular heart beat. Negative for hypertension.    Respiratory:  Negative for cough and shortness of breath.    Gastrointestinal: Negative for abdominal pain, heartburn, melena, nausea, and vomitting.    Genitourinary:  Negative bladder incontinence and dysuria.    Musculoskeletal:  See HPI for details.     Neurological: Negative for numbness.    Psychiatric/Behavioral: Negative for depression.  The patient is not nervous/anxious.      Endocrine: Negative for polyuria    Hematologic/Lymphatic: Negative for bleeding problem.  Does not bruise/bleed easily.    Skin: Negative for poor would healing and rash    Objective:      Physical Examination:    Vital Signs:    Vitals:    11/19/19 1020   BP: (!) 160/90       Body mass index is 44.01 kg/m².    This a well-developed, well nourished patient in no acute distress.  They are alert and oriented and cooperative to examination.        Patient has bilateral varus deformities.  She has bilateral crepitus.  She has bilateral 1+ effusions.  Straight leg raises are negative.  EHL is intact deep tendon reflex are intact  Pertinent New Results:    XRAY Report / Interpretation:   AP lateral and sunrise views of her knees demonstrate advanced bone-on-bone arthritis of the bilateral knees    Assessment/Plan:      Bone on bone arthritis bilateral knees.  I injected both her knees with Depo-Medrol and lidocaine.  She will follow up p.r.n..  Continue weight loss.      This note was created using Dragon voice recognition software that occasionally misinterpreted phrases or words.

## 2019-12-05 RX ORDER — SALMETEROL XINAFOATE 50 UG/1
POWDER, METERED ORAL; RESPIRATORY (INHALATION)
Qty: 60 EACH | Refills: 2 | Status: SHIPPED | OUTPATIENT
Start: 2019-12-05 | End: 2020-02-17 | Stop reason: SDUPTHER

## 2019-12-18 DIAGNOSIS — J45.30 MILD PERSISTENT ASTHMA WITHOUT COMPLICATION: ICD-10-CM

## 2019-12-18 RX ORDER — ALBUTEROL SULFATE 90 UG/1
AEROSOL, METERED RESPIRATORY (INHALATION)
Qty: 18 G | Refills: 0 | Status: SHIPPED | OUTPATIENT
Start: 2019-12-18 | End: 2020-02-17 | Stop reason: SDUPTHER

## 2019-12-31 RX ORDER — QUETIAPINE FUMARATE 100 MG/1
TABLET, FILM COATED ORAL
Qty: 30 TABLET | Refills: 5 | OUTPATIENT
Start: 2019-12-31

## 2020-01-02 RX ORDER — METFORMIN HYDROCHLORIDE 500 MG/1
TABLET ORAL
Qty: 60 TABLET | Refills: 0 | Status: SHIPPED | OUTPATIENT
Start: 2020-01-02 | End: 2020-02-17 | Stop reason: SDUPTHER

## 2020-01-31 DIAGNOSIS — G89.29 CHRONIC BILATERAL LOW BACK PAIN WITHOUT SCIATICA: ICD-10-CM

## 2020-01-31 DIAGNOSIS — M54.50 CHRONIC BILATERAL LOW BACK PAIN WITHOUT SCIATICA: ICD-10-CM

## 2020-02-01 RX ORDER — GABAPENTIN 600 MG/1
TABLET ORAL
Qty: 90 TABLET | Refills: 1 | OUTPATIENT
Start: 2020-02-01

## 2020-02-10 DIAGNOSIS — G89.29 CHRONIC BILATERAL LOW BACK PAIN WITHOUT SCIATICA: ICD-10-CM

## 2020-02-10 DIAGNOSIS — M54.50 CHRONIC BILATERAL LOW BACK PAIN WITHOUT SCIATICA: ICD-10-CM

## 2020-02-11 ENCOUNTER — OFFICE VISIT (OUTPATIENT)
Dept: ORTHOPEDICS | Facility: CLINIC | Age: 45
End: 2020-02-11
Payer: MEDICAID

## 2020-02-11 ENCOUNTER — TELEPHONE (OUTPATIENT)
Dept: SURGERY | Facility: CLINIC | Age: 45
End: 2020-02-11

## 2020-02-11 VITALS — DIASTOLIC BLOOD PRESSURE: 90 MMHG | BODY MASS INDEX: 44.89 KG/M2 | WEIGHT: 293 LBS | SYSTOLIC BLOOD PRESSURE: 162 MMHG

## 2020-02-11 DIAGNOSIS — M17.12 PRIMARY OSTEOARTHRITIS OF LEFT KNEE: ICD-10-CM

## 2020-02-11 DIAGNOSIS — M17.11 PRIMARY OSTEOARTHRITIS OF RIGHT KNEE: Primary | ICD-10-CM

## 2020-02-11 PROCEDURE — 99213 PR OFFICE/OUTPT VISIT, EST, LEVL III, 20-29 MIN: ICD-10-PCS | Mod: 25,S$GLB,, | Performed by: ORTHOPAEDIC SURGERY

## 2020-02-11 PROCEDURE — 20610 DRAIN/INJ JOINT/BURSA W/O US: CPT | Mod: 50,S$GLB,, | Performed by: ORTHOPAEDIC SURGERY

## 2020-02-11 PROCEDURE — 20610 LARGE JOINT ASPIRATION/INJECTION: R KNEE: ICD-10-PCS | Mod: 50,S$GLB,, | Performed by: ORTHOPAEDIC SURGERY

## 2020-02-11 PROCEDURE — 99213 OFFICE O/P EST LOW 20 MIN: CPT | Mod: 25,S$GLB,, | Performed by: ORTHOPAEDIC SURGERY

## 2020-02-11 RX ORDER — QUETIAPINE FUMARATE 100 MG/1
100 TABLET, FILM COATED ORAL NIGHTLY
COMMUNITY
Start: 2019-12-20 | End: 2020-02-11

## 2020-02-11 RX ORDER — METHYLPREDNISOLONE ACETATE 40 MG/ML
40 INJECTION, SUSPENSION INTRA-ARTICULAR; INTRALESIONAL; INTRAMUSCULAR; SOFT TISSUE
Status: DISCONTINUED | OUTPATIENT
Start: 2020-02-11 | End: 2020-02-11 | Stop reason: HOSPADM

## 2020-02-11 RX ORDER — GABAPENTIN 600 MG/1
TABLET ORAL
Qty: 60 TABLET | Refills: 1 | OUTPATIENT
Start: 2020-02-11

## 2020-02-11 RX ORDER — TIZANIDINE 4 MG/1
TABLET ORAL
COMMUNITY
Start: 2020-01-17 | End: 2020-10-14

## 2020-02-11 RX ADMIN — METHYLPREDNISOLONE ACETATE 40 MG: 40 INJECTION, SUSPENSION INTRA-ARTICULAR; INTRALESIONAL; INTRAMUSCULAR; SOFT TISSUE at 10:02

## 2020-02-11 NOTE — TELEPHONE ENCOUNTER
Returned patient's call; no answer; LVM to call back to discuss appointment. (Dr. Quintanilla is accepting new patient's for Bariatrics, but not accepting Medicaid at the moment).

## 2020-02-11 NOTE — PROGRESS NOTES
[facility]    Subjective:     Chief Complaint:   Chief Complaint   Patient presents with    Left Knee - Pain     B/L knee pain (injection done 19) She is here for injections again    Right Knee - Pain       Past Medical History:   Diagnosis Date    Asthma     Bone spur of other site     Diabetes mellitus, type 2     Hyperlipemia     Hypertension     Knee pain        Past Surgical History:   Procedure Laterality Date     SECTION      x2     hysterosocpy polyp removal      KNEE ARTHROSCOPY      TUBAL LIGATION      uterin ablastion         Current Outpatient Medications   Medication Sig    albuterol (PROVENTIL/VENTOLIN HFA) 90 mcg/actuation inhaler USE 2puffs BY MOUTH EVERY 4 HOURS AS NEEDED    benzoyl peroxide (BENZAC AC) 10 % external wash WASH back nightly as tolerated    blood sugar diagnostic (SKYLER BLOOD GLUCOSE TEST STRIP) Strp 1 strip by Misc.(Non-Drug; Combo Route) route once daily.    blood-glucose meter kit Use as instructed    celecoxib (CELEBREX) 200 MG capsule     clotrimazole-betamethasone 1-0.05% (LOTRISONE) cream APPLY topically TWICE DAILY    FLOVENT  mcg/actuation inhaler Inhale 2 puffs into the lungs 2 (two) times daily.    furosemide (LASIX) 40 MG tablet TAKE ONE TABLET BY MOUTH EVERY MORNING    gabapentin (NEURONTIN) 600 MG tablet TAKE 1 TABLET BY MOUTH 3 TIMES DAILY AS NEEDED FOR NERVE PAIN    ibuprofen (ADVIL,MOTRIN) 800 MG tablet TAKE ONE TABLET BY MOUTH EVERY 8 HOURS AS NEEDED    lancets 33 gauge Misc 1 lancet by Misc.(Non-Drug; Combo Route) route once daily.    lisinopril 10 MG tablet TAKE ONE TABLET BY MOUTH EVERY MORNING    melatonin 10 mg Tab Take 1 tablet by mouth every evening.    metFORMIN (GLUCOPHAGE) 500 MG tablet TAKE ONE TABLET BY MOUTH TWICE DAILY    neomycin-polymyxin-hydrocortisone (CORTISPORIN) otic solution Place 3 drops into both ears 3 (three) times daily.    nystatin (MYCOSTATIN) powder APPLY TO THE affected AREA  TWICE DAILY    potassium chloride (MICRO-K) 10 MEQ CpSR TAKE ONE CAPSULE BY MOUTH ONCE DAILY    SEREVENT DISKUS 50 mcg/dose diskus inhaler INHALE 1 PUFFS INTO THE LUNGS EVERY 12 HOURS    tiZANidine (ZANAFLEX) 4 MG tablet     triamcinolone acetonide 0.5% (KENALOG) 0.5 % Crea APPLY TO THE affected AREA of face TWICE DAILY     No current facility-administered medications for this visit.        Review of patient's allergies indicates:  No Known Allergies    Family History   Problem Relation Age of Onset    Diabetes Mother     Dementia Mother     Diabetes Father     Heart disease Father        Social History     Socioeconomic History    Marital status: Single     Spouse name: Not on file    Number of children: Not on file    Years of education: Not on file    Highest education level: Not on file   Occupational History    Not on file   Social Needs    Financial resource strain: Not on file    Food insecurity:     Worry: Not on file     Inability: Not on file    Transportation needs:     Medical: Not on file     Non-medical: Not on file   Tobacco Use    Smoking status: Former Smoker     Packs/day: 0.50     Types: Cigarettes    Smokeless tobacco: Former User   Substance and Sexual Activity    Alcohol use: No    Drug use: Yes     Types: Marijuana    Sexual activity: Yes     Partners: Male     Birth control/protection: None   Lifestyle    Physical activity:     Days per week: Not on file     Minutes per session: Not on file    Stress: Not on file   Relationships    Social connections:     Talks on phone: Not on file     Gets together: Not on file     Attends Yazidism service: Not on file     Active member of club or organization: Not on file     Attends meetings of clubs or organizations: Not on file     Relationship status: Not on file   Other Topics Concern    Not on file   Social History Narrative    Not on file       History of present illness:  Patient returns today for her bilateral knees.   She is miserable she is having difficulty ambulating.  She is having significant resting pain. She has lost over 100 lb.      Review of Systems:    Constitution: Negative for chills, fever, and sweats.  Negative for unexplained weight loss.    HENT:  Negative for headaches and blurry vision.    Cardiovascular:Negative for chest pain or irregular heart beat. Negative for hypertension.    Respiratory:  Negative for cough and shortness of breath.    Gastrointestinal: Negative for abdominal pain, heartburn, melena, nausea, and vomitting.    Genitourinary:  Negative bladder incontinence and dysuria.    Musculoskeletal:  See HPI for details.     Neurological: Negative for numbness.    Psychiatric/Behavioral: Negative for depression.  The patient is not nervous/anxious.      Endocrine: Negative for polyuria    Hematologic/Lymphatic: Negative for bleeding problem.  Does not bruise/bleed easily.    Skin: Negative for poor would healing and rash    Objective:      Physical Examination:    Vital Signs:    Vitals:    02/11/20 1005   BP: (!) 162/90       Body mass index is 44.89 kg/m².    This a well-developed, well nourished patient in no acute distress.  They are alert and oriented and cooperative to examination.        Patient has severe crepitus bilaterally.  She has 1+ effusion on the right 2+ effusion on the left.  Her knees are stable.  Pertinent New Results:    XRAY Report / Interpretation:   AP lateral and sunrise views of her knees demonstrate end-stage bone-on-bone arthritis of the bilateral knees    Assessment/Plan:   Severe bone-on-bone arthritis bilateral knees.  I injected both knees with Depo-Medrol and lidocaine.  Continue with weight loss.  Follow up in 2 months      This note was created using Dragon voice recognition software that occasionally misinterpreted phrases or words.

## 2020-02-11 NOTE — PROCEDURES
Large Joint Aspiration/Injection: R knee  Performed by: Silver Bob MD  Authorized by: Silver Bob MD  Date/Time: 2/11/2020 10:15 AM    Consent Done?:  Yes (Verbal)  Indications:  pain  Timeout: Immediately prior to procedure a time out was called to verify the correct patient, procedure, equipment, support staff and site/side marked as required.  Prep:Patient was prepped and draped in the usual sterile fashion.  Procedure site marked: Yes     Anesthesia  Local anesthesia used  Anesthetic: lidocaine 1% without epinephrine    Details:   Needle size: 25 G    Ultrasonic Guidance for needle placement: No    Medications: 40 mg methylPREDNISolone acetate 40 mg/mL; 40 mg methylPREDNISolone acetate 40 mg/mL  Patient tolerance:  patient tolerated the procedure well with no immediate complications    Large Joint Aspiration/Injection: L knee  Performed by: Silver Bob MD  Authorized by: Silver Bob MD  Date/Time: 2/11/2020 10:15 AM    Consent Done?:  Yes (Verbal)  Indications:  pain  Timeout: Immediately prior to procedure a time out was called to verify the correct patient, procedure, equipment, support staff and site/side marked as required.  Prep:Patient was prepped and draped in the usual sterile fashion.  Procedure site marked: Yes     Anesthesia  Local anesthesia used  Anesthetic: lidocaine 1% without epinephrine    Details:   Needle size: 25 G    Ultrasonic Guidance for needle placement: No    Medications: 40 mg methylPREDNISolone acetate 40 mg/mL; 40 mg methylPREDNISolone acetate 40 mg/mL  Patient tolerance:  patient tolerated the procedure well with no immediate complications

## 2020-02-11 NOTE — TELEPHONE ENCOUNTER
----- Message from Rose Marie Crain sent at 2/11/2020 11:59 AM CST -----  Contact: patient  Please call above patient at [phone] above patient would like to know if doctor accepting new patient's for weight loss waiting on a call from the nurse thanks.

## 2020-02-17 ENCOUNTER — OFFICE VISIT (OUTPATIENT)
Dept: PRIMARY CARE CLINIC | Facility: CLINIC | Age: 45
End: 2020-02-17
Payer: MEDICAID

## 2020-02-17 VITALS
OXYGEN SATURATION: 96 % | TEMPERATURE: 98 F | WEIGHT: 293 LBS | HEIGHT: 69 IN | SYSTOLIC BLOOD PRESSURE: 126 MMHG | HEART RATE: 82 BPM | DIASTOLIC BLOOD PRESSURE: 88 MMHG | BODY MASS INDEX: 43.4 KG/M2

## 2020-02-17 DIAGNOSIS — M54.50 CHRONIC BILATERAL LOW BACK PAIN WITHOUT SCIATICA: ICD-10-CM

## 2020-02-17 DIAGNOSIS — E11.9 TYPE 2 DIABETES MELLITUS WITHOUT COMPLICATION, WITHOUT LONG-TERM CURRENT USE OF INSULIN: ICD-10-CM

## 2020-02-17 DIAGNOSIS — N76.0 BACTERIAL VAGINOSIS: ICD-10-CM

## 2020-02-17 DIAGNOSIS — L02.212 ABSCESS OF BACK: Primary | ICD-10-CM

## 2020-02-17 DIAGNOSIS — M17.0 PRIMARY OSTEOARTHRITIS OF BOTH KNEES: ICD-10-CM

## 2020-02-17 DIAGNOSIS — N30.00 ACUTE CYSTITIS WITHOUT HEMATURIA: ICD-10-CM

## 2020-02-17 DIAGNOSIS — I10 ESSENTIAL HYPERTENSION: ICD-10-CM

## 2020-02-17 DIAGNOSIS — J45.909 ASTHMA, UNSPECIFIED ASTHMA SEVERITY, UNSPECIFIED WHETHER COMPLICATED, UNSPECIFIED WHETHER PERSISTENT: ICD-10-CM

## 2020-02-17 DIAGNOSIS — B96.89 BACTERIAL VAGINOSIS: ICD-10-CM

## 2020-02-17 DIAGNOSIS — G89.29 CHRONIC BILATERAL LOW BACK PAIN WITHOUT SCIATICA: ICD-10-CM

## 2020-02-17 DIAGNOSIS — J45.30 MILD PERSISTENT ASTHMA WITHOUT COMPLICATION: ICD-10-CM

## 2020-02-17 DIAGNOSIS — Z13.6 ENCOUNTER FOR LIPID SCREENING FOR CARDIOVASCULAR DISEASE: ICD-10-CM

## 2020-02-17 DIAGNOSIS — Z13.220 ENCOUNTER FOR LIPID SCREENING FOR CARDIOVASCULAR DISEASE: ICD-10-CM

## 2020-02-17 LAB
BILIRUB SERPL-MCNC: NORMAL MG/DL
BLOOD URINE, POC: NORMAL
COLOR, POC UA: YELLOW
GLUCOSE UR QL STRIP: NORMAL
KETONES UR QL STRIP: NORMAL
LEUKOCYTE ESTERASE URINE, POC: NORMAL
NITRITE, POC UA: NORMAL
PH, POC UA: 5
PROTEIN, POC: NORMAL
SPECIFIC GRAVITY, POC UA: 1.02
UROBILINOGEN, POC UA: NORMAL

## 2020-02-17 PROCEDURE — 81002 URINALYSIS NONAUTO W/O SCOPE: CPT | Mod: PBBFAC,PN | Performed by: INTERNAL MEDICINE

## 2020-02-17 PROCEDURE — 99213 OFFICE O/P EST LOW 20 MIN: CPT | Mod: PBBFAC,PN | Performed by: INTERNAL MEDICINE

## 2020-02-17 PROCEDURE — 99999 PR PBB SHADOW E&M-EST. PATIENT-LVL III: CPT | Mod: PBBFAC,,, | Performed by: INTERNAL MEDICINE

## 2020-02-17 PROCEDURE — 99214 PR OFFICE/OUTPT VISIT, EST, LEVL IV, 30-39 MIN: ICD-10-PCS | Mod: S$PBB,,, | Performed by: INTERNAL MEDICINE

## 2020-02-17 PROCEDURE — 99999 PR PBB SHADOW E&M-EST. PATIENT-LVL III: ICD-10-PCS | Mod: PBBFAC,,, | Performed by: INTERNAL MEDICINE

## 2020-02-17 PROCEDURE — 99214 OFFICE O/P EST MOD 30 MIN: CPT | Mod: S$PBB,,, | Performed by: INTERNAL MEDICINE

## 2020-02-17 RX ORDER — GABAPENTIN 600 MG/1
TABLET ORAL
Qty: 90 TABLET | Refills: 5 | Status: CANCELLED | OUTPATIENT
Start: 2020-02-17

## 2020-02-17 RX ORDER — FUROSEMIDE 40 MG/1
40 TABLET ORAL EVERY MORNING
Qty: 30 TABLET | Refills: 5 | Status: ON HOLD | OUTPATIENT
Start: 2020-02-17 | End: 2020-08-25 | Stop reason: SDUPTHER

## 2020-02-17 RX ORDER — CELECOXIB 200 MG/1
200 CAPSULE ORAL 2 TIMES DAILY
Qty: 60 CAPSULE | Refills: 5 | Status: SHIPPED | OUTPATIENT
Start: 2020-02-17 | End: 2020-10-13

## 2020-02-17 RX ORDER — POTASSIUM CHLORIDE 750 MG/1
10 CAPSULE, EXTENDED RELEASE ORAL DAILY
Qty: 30 CAPSULE | Refills: 5 | Status: SHIPPED | OUTPATIENT
Start: 2020-02-17 | End: 2021-05-13

## 2020-02-17 RX ORDER — MUPIROCIN 20 MG/G
OINTMENT TOPICAL 2 TIMES DAILY
Qty: 22 G | Refills: 0 | Status: SHIPPED | OUTPATIENT
Start: 2020-02-17 | End: 2020-09-29

## 2020-02-17 RX ORDER — METRONIDAZOLE 500 MG/1
500 TABLET ORAL EVERY 12 HOURS
Qty: 14 TABLET | Refills: 0 | Status: ON HOLD | OUTPATIENT
Start: 2020-02-17 | End: 2020-08-25 | Stop reason: HOSPADM

## 2020-02-17 RX ORDER — DEXAMETHASONE 4 MG/1
2 TABLET ORAL 2 TIMES DAILY
Qty: 12 G | Refills: 5 | Status: SHIPPED | OUTPATIENT
Start: 2020-02-17 | End: 2021-07-30

## 2020-02-17 RX ORDER — LISINOPRIL 10 MG/1
10 TABLET ORAL EVERY MORNING
Qty: 30 TABLET | Refills: 5 | Status: SHIPPED | OUTPATIENT
Start: 2020-02-17 | End: 2020-08-04 | Stop reason: SDUPTHER

## 2020-02-17 RX ORDER — ALBUTEROL SULFATE 90 UG/1
AEROSOL, METERED RESPIRATORY (INHALATION)
Qty: 18 G | Refills: 5 | Status: SHIPPED | OUTPATIENT
Start: 2020-02-17 | End: 2021-07-30

## 2020-02-17 RX ORDER — SULFAMETHOXAZOLE AND TRIMETHOPRIM 800; 160 MG/1; MG/1
1 TABLET ORAL 2 TIMES DAILY
Qty: 20 TABLET | Refills: 0 | Status: SHIPPED | OUTPATIENT
Start: 2020-02-17 | End: 2020-02-27

## 2020-02-17 RX ORDER — METFORMIN HYDROCHLORIDE 500 MG/1
500 TABLET ORAL 2 TIMES DAILY
Qty: 60 TABLET | Refills: 5 | Status: SHIPPED | OUTPATIENT
Start: 2020-02-17 | End: 2020-10-14

## 2020-02-17 NOTE — TELEPHONE ENCOUNTER
----- Message from Nikhil Brian sent at 2/17/2020  1:45 PM CST -----  Contact: Pt [phone]  Patient is calling for an RX refill or new RX.  Is this a refill or new RX:  Refill  RX name and strength: gabapentin (NEURONTIN) 600 MG tablet  Directions (copy/paste from chart):    Is this a 30 day or 90 day RX:  90  Local pharmacy or mail order pharmacy:    Pharmacy name and phone # [facility] - [place], [place] - [address] [phone] (Phone) [phone] (Fax)       Comments:  She said she thought you were going to call it in.

## 2020-02-17 NOTE — PROGRESS NOTES
Subjective:       Patient ID: Qiana Collins is a 44 y.o. female.    Chief Complaint: Medication Refill and Abscess (back)    HPI  Pt is here for med refills being scheduled for rt knee replacemnt and gastric sleeve to loose wt clkinically better ha sbeen loosing wt pt also c/o her urine smell bad since on diff metformin no diarrhea no sob cp no n/v/d  Review of Systems    Objective:      Physical Exam   Constitutional: She is oriented to person, place, and time. She appears well-developed and well-nourished. No distress.   obesed   HENT:   Head: Normocephalic and atraumatic.   Right Ear: External ear normal.   Left Ear: External ear normal.   Nose: Nose normal.   Mouth/Throat: Oropharynx is clear and moist. No oropharyngeal exudate.   Eyes: Pupils are equal, round, and reactive to light. Conjunctivae and EOM are normal. Right eye exhibits no discharge. Left eye exhibits no discharge.   Neck: Normal range of motion. Neck supple. No thyromegaly present.   Cardiovascular: Normal rate, regular rhythm, normal heart sounds and intact distal pulses. Exam reveals no gallop and no friction rub.   No murmur heard.  Pulmonary/Chest: Effort normal and breath sounds normal. No respiratory distress. She has no wheezes. She has no rales. She exhibits no tenderness.   Abdominal: Soft. Bowel sounds are normal. She exhibits no distension. There is no tenderness. There is no rebound and no guarding.   Musculoskeletal: Normal range of motion. She exhibits tenderness (bilateral knee pain). She exhibits no edema or deformity.   Lymphadenopathy:     She has no cervical adenopathy.   Neurological: She is alert and oriented to person, place, and time.   Skin: Skin is warm and dry. Capillary refill takes less than 2 seconds. No rash noted. No erythema.   Rt flank with small abscess rt side abd  Small erythema tender with palpation   Psychiatric: She has a normal mood and affect. Judgment and thought content normal.   Nursing note and  vitals reviewed.      Assessment:       1. Abscess of back    2. Acute cystitis without hematuria    3. Type 2 diabetes mellitus without complication, without long-term current use of insulin    4. Essential hypertension    5. Primary osteoarthritis of both knees    6. Encounter for lipid screening for cardiovascular disease    7. Bacterial vaginosis    8. Asthma, unspecified asthma severity, unspecified whether complicated, unspecified whether persistent    9. Chronic bilateral low back pain without sciatica    10. Mild persistent asthma without complication        Plan:       Abscess of back  -     sulfamethoxazole-trimethoprim 800-160mg (BACTRIM DS) 800-160 mg Tab; Take 1 tablet by mouth 2 (two) times daily. for 10 days  Dispense: 20 tablet; Refill: 0  -     mupirocin (BACTROBAN) 2 % ointment; Apply topically 2 (two) times daily.  Dispense: 22 g; Refill: 0    Acute cystitis without hematuria  -     POCT urine dipstick without microscope; Future; Expected date: 02/17/2020    Type 2 diabetes mellitus without complication, without long-term current use of insulin  -     Hemoglobin A1c; Future; Expected date: 02/17/2020  -     Microalbumin/creatinine urine ratio; Future; Expected date: 02/17/2020    Essential hypertension  -     CBC auto differential; Future; Expected date: 02/17/2020  -     Comprehensive metabolic panel; Future; Expected date: 02/17/2020    Primary osteoarthritis of both knees    Encounter for lipid screening for cardiovascular disease  -     Lipid panel; Future; Expected date: 02/17/2020    Bacterial vaginosis  -     metroNIDAZOLE (FLAGYL) 500 MG tablet; Take 1 tablet (500 mg total) by mouth every 12 (twelve) hours.  Dispense: 14 tablet; Refill: 0    Asthma, unspecified asthma severity, unspecified whether complicated, unspecified whether persistent    Chronic bilateral low back pain without sciatica    Mild persistent asthma without complication

## 2020-02-18 DIAGNOSIS — J45.30 MILD PERSISTENT ASTHMA WITHOUT COMPLICATION: ICD-10-CM

## 2020-02-18 RX ORDER — ALBUTEROL SULFATE 90 UG/1
2 AEROSOL, METERED RESPIRATORY (INHALATION) EVERY 4 HOURS PRN
Qty: 18 G | Refills: 5 | Status: CANCELLED | OUTPATIENT
Start: 2020-02-18

## 2020-02-18 RX ORDER — GABAPENTIN 600 MG/1
TABLET ORAL
Qty: 90 TABLET | Refills: 1 | Status: SHIPPED | OUTPATIENT
Start: 2020-02-18 | End: 2020-05-04

## 2020-02-18 RX ORDER — ALBUTEROL SULFATE 90 UG/1
AEROSOL, METERED RESPIRATORY (INHALATION)
Qty: 18 G | Refills: 5 | Status: CANCELLED | OUTPATIENT
Start: 2020-02-18

## 2020-02-18 NOTE — TELEPHONE ENCOUNTER
----- Message from Nikhil Brian sent at 2/18/2020  3:23 PM CST -----  Contact: [facility]/ [place] [phone]  Pharmacy is calling to clarify an RX.  RX name:  albuterol (PROVENTIL/VENTOLIN HFA) 90 mcg/actuation inhaler  What do they need to clarify:  The direction didn't go thru  Comments:

## 2020-02-18 NOTE — TELEPHONE ENCOUNTER
----- Message from Tatyana Jean sent at 2/18/2020  9:29 AM CST -----  Contact: Carmen with [employer] phone [phone]  Type:  Pharmacy Calling to Clarify an RX    Name of Caller:  Carmen  Pharmacy Name:  [facility]  Prescription Name:  albuterol (PROVENTIL/VENTOLIN HFA) 90 mcg/actuation inhaler  What do they need to clarify?:  Insrructions  Best Call Back Number:  [phone]  Additional Information:  Please call her. Thanks.

## 2020-04-14 ENCOUNTER — OFFICE VISIT (OUTPATIENT)
Dept: ORTHOPEDICS | Facility: CLINIC | Age: 45
End: 2020-04-14
Payer: MEDICAID

## 2020-04-14 VITALS — SYSTOLIC BLOOD PRESSURE: 149 MMHG | BODY MASS INDEX: 44.89 KG/M2 | WEIGHT: 293 LBS | DIASTOLIC BLOOD PRESSURE: 93 MMHG

## 2020-04-14 DIAGNOSIS — M17.11 PRIMARY OSTEOARTHRITIS OF RIGHT KNEE: Primary | ICD-10-CM

## 2020-04-14 DIAGNOSIS — M17.12 PRIMARY OSTEOARTHRITIS OF LEFT KNEE: ICD-10-CM

## 2020-04-14 PROCEDURE — 99213 OFFICE O/P EST LOW 20 MIN: CPT | Mod: S$GLB,,, | Performed by: ORTHOPAEDIC SURGERY

## 2020-04-14 PROCEDURE — 99213 PR OFFICE/OUTPT VISIT, EST, LEVL III, 20-29 MIN: ICD-10-PCS | Mod: S$GLB,,, | Performed by: ORTHOPAEDIC SURGERY

## 2020-04-14 NOTE — PROGRESS NOTES
[facility]    Subjective:     Chief Complaint:   Chief Complaint   Patient presents with    Left Knee - Pain     Bilateral knee inj f/u from 20. She wants to schedule right knee surgery. It gives out alot    Right Knee - Pain       Past Medical History:   Diagnosis Date    Asthma     Bone spur of other site     Diabetes mellitus, type 2     Hyperlipemia     Hypertension     Knee pain        Past Surgical History:   Procedure Laterality Date     SECTION      x2     hysterosocpy polyp removal      KNEE ARTHROSCOPY      TUBAL LIGATION      uterin ablastion         Current Outpatient Medications   Medication Sig    albuterol (PROVENTIL/VENTOLIN HFA) 90 mcg/actuation inhaler Rescue    benzoyl peroxide (BENZAC AC) 10 % external wash WASH back nightly as tolerated    blood sugar diagnostic (SKYLER BLOOD GLUCOSE TEST STRIP) Strp 1 strip by Misc.(Non-Drug; Combo Route) route once daily.    blood-glucose meter kit Use as instructed    celecoxib (CELEBREX) 200 MG capsule Take 1 capsule (200 mg total) by mouth 2 (two) times daily.    clotrimazole-betamethasone 1-0.05% (LOTRISONE) cream APPLY topically TWICE DAILY    FLOVENT  mcg/actuation inhaler Inhale 2 puffs into the lungs 2 (two) times daily.    furosemide (LASIX) 40 MG tablet Take 1 tablet (40 mg total) by mouth every morning.    gabapentin (NEURONTIN) 600 MG tablet TAKE 1 TABLET BY MOUTH 3 TIMES DAILY AS NEEDED FOR NERVE PAIN    lancets 33 gauge Misc 1 lancet by Misc.(Non-Drug; Combo Route) route once daily.    lisinopril 10 MG tablet Take 1 tablet (10 mg total) by mouth every morning.    melatonin 10 mg Tab Take 1 tablet by mouth every evening.    metFORMIN (GLUCOPHAGE) 500 MG tablet Take 1 tablet (500 mg total) by mouth 2 (two) times daily.    metroNIDAZOLE (FLAGYL) 500 MG tablet Take 1 tablet (500 mg total) by mouth every 12 (twelve) hours.    mupirocin (BACTROBAN) 2 % ointment Apply topically 2 (two) times  daily.    neomycin-polymyxin-hydrocortisone (CORTISPORIN) otic solution Place 3 drops into both ears 3 (three) times daily.    nystatin (MYCOSTATIN) powder APPLY TO THE affected AREA TWICE DAILY    potassium chloride (MICRO-K) 10 MEQ CpSR Take 1 capsule (10 mEq total) by mouth once daily.    salmeterol (SEREVENT DISKUS) 50 mcg/dose diskus inhaler INHALE 1 PUFFS INTO THE LUNGS EVERY 12 HOURS    tiZANidine (ZANAFLEX) 4 MG tablet     triamcinolone acetonide 0.5% (KENALOG) 0.5 % Crea APPLY TO THE affected AREA of face TWICE DAILY     No current facility-administered medications for this visit.        Review of patient's allergies indicates:  No Known Allergies    Family History   Problem Relation Age of Onset    Diabetes Mother     Dementia Mother     Diabetes Father     Heart disease Father        Social History     Socioeconomic History    Marital status: Single     Spouse name: Not on file    Number of children: Not on file    Years of education: Not on file    Highest education level: Not on file   Occupational History    Not on file   Social Needs    Financial resource strain: Not on file    Food insecurity:     Worry: Not on file     Inability: Not on file    Transportation needs:     Medical: Not on file     Non-medical: Not on file   Tobacco Use    Smoking status: Former Smoker     Packs/day: 0.50     Types: Cigarettes    Smokeless tobacco: Former User   Substance and Sexual Activity    Alcohol use: No    Drug use: Yes     Types: Marijuana    Sexual activity: Yes     Partners: Male     Birth control/protection: None   Lifestyle    Physical activity:     Days per week: Not on file     Minutes per session: Not on file    Stress: Not on file   Relationships    Social connections:     Talks on phone: Not on file     Gets together: Not on file     Attends Buddhist service: Not on file     Active member of club or organization: Not on file     Attends meetings of clubs or organizations: Not  on file     Relationship status: Not on file   Other Topics Concern    Not on file   Social History Narrative    Not on file       History of present illness:  Patient comes in today for her bilateral knees.  The right knee is giving out its locking she is miserable.  She wants to have it replaced.  She has had many Depo-Medrol injections.  She has undergone arthroscopy.  She has failed strengthening.  She has failed nonsteroidals.      Review of Systems:    Constitution: Negative for chills, fever, and sweats.  Negative for unexplained weight loss.    HENT:  Negative for headaches and blurry vision.    Cardiovascular:Negative for chest pain or irregular heart beat. Negative for hypertension.    Respiratory:  Negative for cough and shortness of breath.    Gastrointestinal: Negative for abdominal pain, heartburn, melena, nausea, and vomitting.    Genitourinary:  Negative bladder incontinence and dysuria.    Musculoskeletal:  See HPI for details.     Neurological: Negative for numbness.    Psychiatric/Behavioral: Negative for depression.  The patient is not nervous/anxious.      Endocrine: Negative for polyuria    Hematologic/Lymphatic: Negative for bleeding problem.  Does not bruise/bleed easily.    Skin: Negative for poor would healing and rash    Objective:      Physical Examination:    Vital Signs:    Vitals:    04/14/20 1009   BP: (!) 149/93       Body mass index is 44.89 kg/m².    This a well-developed, well nourished patient in no acute distress.  They are alert and oriented and cooperative to examination.        Patient has range of motion 0-100 degrees.  She has severe varus deformity.  She has crepitus.  She has a 2+ effusion.  Pertinent New Results:    XRAY Report / Interpretation:       Assessment/Plan:      Longstanding severe end-stage arthritis of the right knee.  She has failed an extensive course of conservative care including and Depo-Medrol injection , arthroscopy, nonsteroidals, strengthening, I  have offered her a right total knee.  She understands that she will need a revision at some point in her life because of her young age.  Spoke extensively about weight loss.  She will follow-up for the procedure.      This note was created using Dragon voice recognition software that occasionally misinterpreted phrases or words.

## 2020-05-04 DIAGNOSIS — M54.50 CHRONIC BILATERAL LOW BACK PAIN WITHOUT SCIATICA: ICD-10-CM

## 2020-05-04 DIAGNOSIS — G89.29 CHRONIC BILATERAL LOW BACK PAIN WITHOUT SCIATICA: ICD-10-CM

## 2020-05-04 RX ORDER — GABAPENTIN 600 MG/1
TABLET ORAL
Qty: 90 TABLET | Refills: 0 | Status: SHIPPED | OUTPATIENT
Start: 2020-05-04 | End: 2020-06-16

## 2020-05-06 DIAGNOSIS — M47.26 OTHER SPONDYLOSIS WITH RADICULOPATHY, LUMBAR REGION: Primary | ICD-10-CM

## 2020-05-08 DIAGNOSIS — M17.11 OSTEOARTHRITIS OF RIGHT KNEE: ICD-10-CM

## 2020-05-08 DIAGNOSIS — M17.11 PRIMARY OSTEOARTHRITIS OF RIGHT KNEE: Primary | ICD-10-CM

## 2020-05-13 ENCOUNTER — TELEPHONE (OUTPATIENT)
Dept: ORTHOPEDICS | Facility: CLINIC | Age: 45
End: 2020-05-13

## 2020-05-19 ENCOUNTER — HOSPITAL ENCOUNTER (OUTPATIENT)
Dept: RADIOLOGY | Facility: HOSPITAL | Age: 45
Discharge: HOME | End: 2020-05-19
Attending: NURSE PRACTITIONER
Payer: MEDICAID

## 2020-05-19 DIAGNOSIS — M47.26 OTHER SPONDYLOSIS WITH RADICULOPATHY, LUMBAR REGION: ICD-10-CM

## 2020-05-19 PROCEDURE — 72148 MRI LUMBAR SPINE W/O DYE: CPT | Mod: TC,PO

## 2020-05-25 ENCOUNTER — PATIENT MESSAGE (OUTPATIENT)
Dept: PRIMARY CARE CLINIC | Facility: CLINIC | Age: 45
End: 2020-05-25

## 2020-05-26 ENCOUNTER — CLINICAL SUPPORT (OUTPATIENT)
Dept: PRIMARY CARE CLINIC | Facility: CLINIC | Age: 45
End: 2020-05-26
Payer: MEDICAID

## 2020-05-26 DIAGNOSIS — N30.00 ACUTE CYSTITIS WITHOUT HEMATURIA: Primary | ICD-10-CM

## 2020-05-26 DIAGNOSIS — Z20.2 EXPOSURE TO STD: ICD-10-CM

## 2020-05-26 LAB
BILIRUB SERPL-MCNC: ABNORMAL MG/DL
BLOOD URINE, POC: ABNORMAL
COLOR, POC UA: YELLOW
GLUCOSE UR QL STRIP: NORMAL
KETONES UR QL STRIP: ABNORMAL
LEUKOCYTE ESTERASE URINE, POC: ABNORMAL
NITRITE, POC UA: POSITIVE
PH, POC UA: 5
PROTEIN, POC: ABNORMAL
SPECIFIC GRAVITY, POC UA: 1.02
UROBILINOGEN, POC UA: NORMAL

## 2020-05-26 PROCEDURE — 81002 URINALYSIS NONAUTO W/O SCOPE: CPT | Mod: PBBFAC,PN

## 2020-05-26 PROCEDURE — 87491 CHLMYD TRACH DNA AMP PROBE: CPT

## 2020-05-27 ENCOUNTER — PATIENT MESSAGE (OUTPATIENT)
Dept: PRIMARY CARE CLINIC | Facility: CLINIC | Age: 45
End: 2020-05-27

## 2020-05-28 ENCOUNTER — OFFICE VISIT (OUTPATIENT)
Dept: PRIMARY CARE CLINIC | Facility: CLINIC | Age: 45
End: 2020-05-28
Payer: MEDICAID

## 2020-05-28 ENCOUNTER — TELEPHONE (OUTPATIENT)
Dept: PRIMARY CARE CLINIC | Facility: CLINIC | Age: 45
End: 2020-05-28

## 2020-05-28 DIAGNOSIS — B37.2 INTERTRIGINOUS CANDIDIASIS: ICD-10-CM

## 2020-05-28 DIAGNOSIS — M17.11 PRIMARY OSTEOARTHRITIS OF RIGHT KNEE: ICD-10-CM

## 2020-05-28 DIAGNOSIS — N30.00 ACUTE CYSTITIS WITHOUT HEMATURIA: Primary | ICD-10-CM

## 2020-05-28 LAB
C TRACH DNA SPEC QL NAA+PROBE: NOT DETECTED
N GONORRHOEA DNA SPEC QL NAA+PROBE: NOT DETECTED

## 2020-05-28 PROCEDURE — 99213 PR OFFICE/OUTPT VISIT, EST, LEVL III, 20-29 MIN: ICD-10-PCS | Mod: 95,,, | Performed by: INTERNAL MEDICINE

## 2020-05-28 PROCEDURE — 99213 OFFICE O/P EST LOW 20 MIN: CPT | Mod: 95,,, | Performed by: INTERNAL MEDICINE

## 2020-05-28 RX ORDER — CLOTRIMAZOLE AND BETAMETHASONE DIPROPIONATE 10; .64 MG/G; MG/G
CREAM TOPICAL
Qty: 45 G | Refills: 2 | Status: ON HOLD | OUTPATIENT
Start: 2020-05-28 | End: 2021-05-14 | Stop reason: HOSPADM

## 2020-05-28 RX ORDER — NYSTATIN 100000 [USP'U]/G
POWDER TOPICAL
Qty: 60 G | Refills: 1 | Status: SHIPPED | OUTPATIENT
Start: 2020-05-28 | End: 2020-08-04 | Stop reason: SDUPTHER

## 2020-05-28 RX ORDER — CIPROFLOXACIN 500 MG/1
500 TABLET ORAL EVERY 12 HOURS
Qty: 14 TABLET | Refills: 0 | Status: SHIPPED | OUTPATIENT
Start: 2020-05-28 | End: 2020-06-04

## 2020-05-28 NOTE — PROGRESS NOTES
Subjective:    The patient location is: [place]  The chief complaint leading to consultation is:UTI     Visit type: audiovisual    Face to Face time with patient: 12 minutes   minutes of total time spent on the encounter, which includes face to face time and non-face to face time preparing to see the patient (eg, review of tests), Obtaining and/or reviewing separately obtained history, Documenting clinical information in the electronic or other health record, Independently interpreting results (not separately reported) and communicating results to the patient/family/caregiver, or Care coordination (not separately reported).         Each patient to whom he or she provides medical services by telemedicine is:  (1) informed of the relationship between the physician and patient and the respective role of any other health care provider with respect to management of the patient; and (2) notified that he or she may decline to receive medical services by telemedicine and may withdraw from such care at any time.    Notes:    Patient ID: Qiana Collins is a 45 y.o. female.    Chief Complaint: No chief complaint on file.    HPI  patient here for a UA show UTI she does have mild symptom will treat with p.o. antibiotic the urine for GC chlamydia negative she had chronic skin rash under the skin folds in the lower abdomen bilaterally burning and itching patient has been using nystatin lb and Lotrisone cream to keep it  under control but continue to have flareup she may need surgical intervention to prevent furthee problem today she deny fever short of breath chest pain nausea vomiting or abdominal pain she is also await for right knee  placement next week due to end-stage severe bone-on-bone osteoarthritis of the right knee she will also have gastric sleeve to lose weight in the near future  Review of Systems    Objective:      Physical Exam   Constitutional: She is oriented to person, place, and time. She appears  well-developed and well-nourished.   Overweight no distress   Eyes: EOM are normal.   Neck: Neck supple.   Pulmonary/Chest: Effort normal.   Abdominal: Soft.   Neurological: She is alert and oriented to person, place, and time.   Psychiatric: She has a normal mood and affect. Her behavior is normal. Judgment and thought content normal.       Assessment:       1. Acute cystitis without hematuria    2. Intertriginous candidiasis    3. Primary osteoarthritis of right knee        Plan:       Acute cystitis without hematuria    Intertriginous candidiasis  Comments:  abdominal skin folds  Orders:  -     nystatin (MYCOSTATIN) powder; APPLY TO THE affected AREA TWICE DAILY  Dispense: 60 g; Refill: 1  -     clotrimazole-betamethasone 1-0.05% (LOTRISONE) cream; APPLY topically TWICE DAILY  Dispense: 45 g; Refill: 2    Primary osteoarthritis of right knee  Comments:  Patient will have total knee replacement next week

## 2020-05-28 NOTE — TELEPHONE ENCOUNTER
rx  Pended per result note below . Please sign and send to pharmacy.    ----- Message from Darien dOonnell MD sent at 5/28/2020  5:27 AM CDT -----  Please call the patient regarding her U/A patient has a UTI need antibiotic see Cipro 250 mg twice a day for 7 day repeat urinalysis in 2 weeks

## 2020-05-28 NOTE — TELEPHONE ENCOUNTER
Pt. Notified to d/c Tizanidine while on course of Cipro 500 mg bid x 10 days for UTI due to drug interaction. Pt. Has pre-op for knee surgery on 6/8/2020 surgery is scheduled for 6/15

## 2020-06-08 ENCOUNTER — HOSPITAL ENCOUNTER (OUTPATIENT)
Dept: RADIOLOGY | Facility: HOSPITAL | Age: 45
Discharge: HOME | End: 2020-06-08
Attending: ORTHOPAEDIC SURGERY
Payer: MEDICAID

## 2020-06-08 ENCOUNTER — TELEPHONE (OUTPATIENT)
Dept: ORTHOPEDICS | Facility: CLINIC | Age: 45
End: 2020-06-08

## 2020-06-08 ENCOUNTER — HOSPITAL ENCOUNTER (OUTPATIENT)
Dept: PREADMISSION TESTING | Facility: HOSPITAL | Age: 45
Discharge: HOME | End: 2020-06-08
Attending: ORTHOPAEDIC SURGERY
Payer: MEDICAID

## 2020-06-08 VITALS — HEIGHT: 69 IN | WEIGHT: 290 LBS | BODY MASS INDEX: 42.95 KG/M2

## 2020-06-08 DIAGNOSIS — M17.11 PRIMARY OSTEOARTHRITIS OF RIGHT KNEE: Primary | ICD-10-CM

## 2020-06-08 DIAGNOSIS — N39.0 URINARY TRACT INFECTION WITHOUT HEMATURIA, SITE UNSPECIFIED: Primary | ICD-10-CM

## 2020-06-08 LAB
ABO + RH BLD: NORMAL
ALBUMIN SERPL BCP-MCNC: 3.9 G/DL (ref 3.5–5.2)
ALP SERPL-CCNC: 70 U/L (ref 55–135)
ALT SERPL W/O P-5'-P-CCNC: 27 U/L (ref 10–44)
ANION GAP SERPL CALC-SCNC: 14 MMOL/L (ref 8–16)
AST SERPL-CCNC: 20 U/L (ref 10–40)
BACTERIA #/AREA URNS HPF: ABNORMAL /HPF
BASOPHILS # BLD AUTO: 0.03 K/UL (ref 0–0.2)
BASOPHILS NFR BLD: 0.4 % (ref 0–1.9)
BILIRUB SERPL-MCNC: 0.6 MG/DL (ref 0.1–1)
BILIRUB UR QL STRIP: NEGATIVE
BLD GP AB SCN CELLS X3 SERPL QL: NORMAL
BUN SERPL-MCNC: 10 MG/DL (ref 6–20)
CALCIUM SERPL-MCNC: 9.6 MG/DL (ref 8.7–10.5)
CHLORIDE SERPL-SCNC: 106 MMOL/L (ref 95–110)
CLARITY UR: ABNORMAL
CO2 SERPL-SCNC: 18 MMOL/L (ref 23–29)
COLOR UR: YELLOW
CREAT SERPL-MCNC: 0.9 MG/DL (ref 0.5–1.4)
DIFFERENTIAL METHOD: ABNORMAL
EOSINOPHIL # BLD AUTO: 0.3 K/UL (ref 0–0.5)
EOSINOPHIL NFR BLD: 3.5 % (ref 0–8)
ERYTHROCYTE [DISTWIDTH] IN BLOOD BY AUTOMATED COUNT: 13.2 % (ref 11.5–14.5)
EST. GFR  (AFRICAN AMERICAN): >60 ML/MIN/1.73 M^2
EST. GFR  (NON AFRICAN AMERICAN): >60 ML/MIN/1.73 M^2
GLUCOSE SERPL-MCNC: 167 MG/DL (ref 70–110)
GLUCOSE UR QL STRIP: NEGATIVE
HCT VFR BLD AUTO: 47.1 % (ref 37–48.5)
HGB BLD-MCNC: 14.9 G/DL (ref 12–16)
HGB UR QL STRIP: NEGATIVE
IMM GRANULOCYTES # BLD AUTO: 0.03 K/UL (ref 0–0.04)
IMM GRANULOCYTES NFR BLD AUTO: 0.4 % (ref 0–0.5)
KETONES UR QL STRIP: NEGATIVE
LEUKOCYTE ESTERASE UR QL STRIP: ABNORMAL
LYMPHOCYTES # BLD AUTO: 2.3 K/UL (ref 1–4.8)
LYMPHOCYTES NFR BLD: 28.5 % (ref 18–48)
MCH RBC QN AUTO: 31.6 PG (ref 27–31)
MCHC RBC AUTO-ENTMCNC: 31.6 G/DL (ref 32–36)
MCV RBC AUTO: 100 FL (ref 82–98)
MICROSCOPIC COMMENT: ABNORMAL
MONOCYTES # BLD AUTO: 0.6 K/UL (ref 0.3–1)
MONOCYTES NFR BLD: 7.2 % (ref 4–15)
NEUTROPHILS # BLD AUTO: 4.8 K/UL (ref 1.8–7.7)
NEUTROPHILS NFR BLD: 60 % (ref 38–73)
NITRITE UR QL STRIP: POSITIVE
NRBC BLD-RTO: 0 /100 WBC
PH UR STRIP: 5 [PH] (ref 5–8)
PLATELET # BLD AUTO: 141 K/UL (ref 150–350)
PMV BLD AUTO: 13.1 FL (ref 9.2–12.9)
POTASSIUM SERPL-SCNC: 4 MMOL/L (ref 3.5–5.1)
PROT SERPL-MCNC: 7.8 G/DL (ref 6–8.4)
PROT UR QL STRIP: NEGATIVE
RBC # BLD AUTO: 4.71 M/UL (ref 4–5.4)
SODIUM SERPL-SCNC: 138 MMOL/L (ref 136–145)
SP GR UR STRIP: >=1.03 (ref 1–1.03)
SQUAMOUS #/AREA URNS HPF: 9 /HPF
URN SPEC COLLECT METH UR: ABNORMAL
UROBILINOGEN UR STRIP-ACNC: NEGATIVE EU/DL
WBC # BLD AUTO: 7.94 K/UL (ref 3.9–12.7)
WBC #/AREA URNS HPF: 5 /HPF (ref 0–5)

## 2020-06-08 PROCEDURE — 87086 URINE CULTURE/COLONY COUNT: CPT

## 2020-06-08 PROCEDURE — 81000 URINALYSIS NONAUTO W/SCOPE: CPT

## 2020-06-08 PROCEDURE — 87088 URINE BACTERIA CULTURE: CPT | Mod: 59

## 2020-06-08 PROCEDURE — 87077 CULTURE AEROBIC IDENTIFY: CPT

## 2020-06-08 PROCEDURE — 80053 COMPREHEN METABOLIC PANEL: CPT

## 2020-06-08 PROCEDURE — 36415 COLL VENOUS BLD VENIPUNCTURE: CPT

## 2020-06-08 PROCEDURE — 99900103 DSU ONLY-NO CHARGE-INITIAL HR (STAT)

## 2020-06-08 PROCEDURE — 71046 XR CHEST PA AND LATERAL: ICD-10-PCS | Mod: 26,,, | Performed by: RADIOLOGY

## 2020-06-08 PROCEDURE — 71046 X-RAY EXAM CHEST 2 VIEWS: CPT | Mod: TC,FY

## 2020-06-08 PROCEDURE — 71046 X-RAY EXAM CHEST 2 VIEWS: CPT | Mod: 26,,, | Performed by: RADIOLOGY

## 2020-06-08 PROCEDURE — 85025 COMPLETE CBC W/AUTO DIFF WBC: CPT

## 2020-06-08 PROCEDURE — 87186 SC STD MICRODIL/AGAR DIL: CPT

## 2020-06-08 PROCEDURE — 86901 BLOOD TYPING SEROLOGIC RH(D): CPT

## 2020-06-08 PROCEDURE — 87081 CULTURE SCREEN ONLY: CPT

## 2020-06-08 PROCEDURE — 99900104 DSU ONLY-NO CHARGE-EA ADD'L HR (STAT)

## 2020-06-08 RX ORDER — SULFAMETHOXAZOLE AND TRIMETHOPRIM 800; 160 MG/1; MG/1
1 TABLET ORAL
COMMUNITY
Start: 2020-06-08 | End: 2020-06-14

## 2020-06-08 NOTE — PRE ADMISSION SCREENING
Patient Name: Qiana Collins  YOB: 1975   MRN: [id number]     [facility]   Basic Mobility Inpatient Short Form 6 Clicks         How much difficulty does the patient currently have  Unable  A Lot  A Little  None      1. Turning over in bed (including adjusting bedclothes, sheets and blankets)?     1 []    2 []    3 []    4 [x]        2. Sitting down on and standing up from a chair with arms (e.g., wheelchair, bedside commode, etc.)     1 []  2 []  3 [x]     4 []      3. Moving from lying on back to sitting on the side of the bed?     1 []  2 []  3 []    4 [x]    How much help from another person does the patient currently need  Total  A Lot  A Little  None      4. Moving to and from a bed to a chair (including a wheelchair)?    1 []  2 []  3 []    4 [x]      5. Need to walk in hospital room?    1 []  2 []  3 []    4 [x]      6. Climbing 3-5 steps with a railing?    1 []  2 []  3 []    4 [x]       Raw Score:      23            CMS 0-100% Score:   11.20         %   Standardized Score:    56.93           CMS Modifier:      CI                                    [facility]   Basic [facility]ility Inpatient Short Form 6 Clicks Score Conversion Table*         *Use this form to convert -PAC Basic Mobility Inpatient Raw Scores.   [facility] Inpatient Basic Mobility Short Form Scoring Example   1. Add the number values associated with the response to each item. For example, items totals yield a Raw Score of 21.   2. Match the raw score to the t-Scale scores (t-Scale score = 50.25, SE = 4.69).   3. Find the associated CMS % (CMS % = 28.97%).   4. Locate the correct CMS Functional Modifier Code, or G Code (G code = CJ)     NOTE: Each -PAC Short Form has a separate conversion table. Make sure that you use the correct conversion table.       Instruction Manual - page 45 contains conversion table

## 2020-06-08 NOTE — DISCHARGE INSTRUCTIONS
To confirm, Your doctor has instructed you that surgery is scheduled for: 6/15/2020    Please report to [facility], Registration the morning of surgery. You must check-in and receive a wristband before going to your procedure.    Pre-Op will call the afternoon prior to surgery between 1:00 and 6:00 PM with the final arrival time.  Phone number: [phone]    PLEASE NOTE:  The surgery schedule has many variables which may affect the time of your surgery case.  Family members should be available if your surgery time changes.  Plan to be here the day of your procedure between 4-6 hours.    MEDICATIONS:  TAKE ONLY THESE MEDICATIONS WITH A SMALL SIP OF WATER THE MORNING OF YOUR PROCEDURE:  VENTOLIN, FLOVENT, SEREVENT, GABAPENTIN    NO METFORMIN NIGHT BEFORE OR MORNING OF SURGERY    DO NOT TAKE THESE MEDICATIONS 5-7 DAYS PRIOR to your procedure or per your surgeon's request:  ASPIRIN, ALEVE, ADVIL, IBUPROFEN, FISH OIL VITAMIN E, HERBALS, CELEVREX, MELATONIN  (May take Tylenol)    ONLY if you are prescribed any types of blood thinners such as:  Aspirin, Coumadin, Plavix, Pradaxa, Xarelto, Aggrenox, Effient, Eliquis, Savasya, Brilinta, or any other, ask your surgeon whether you should stop taking them and how long before surgery you should stop.  You may also need to verify with the prescribing physician if it is ok to stop your medication.      INSTRUCTIONS IMPORTANT!!  Do not eat or drink anything between midnight and the time of your procedure- this includes gum, mints, and candy.  Do not smoke or drink alcoholic beverages 24 hours prior to your procedure.  Shower the night before AND the morning of your procedure with a Chlorhexidine wash such as Hibiclens or Dial antibacterial soap from the neck down.  Do not get it on your face or in your eyes.  You may use your own shampoo and face wash. This helps your skin to be as bacteria free as possible.    If you wear contact lenses, dentures, hearing  aids or glasses, bring a container to put them in during surgery and give to a family member for safe keeping.  Please leave all jewelry, piercing's and valuables at home.   DO NOT remove hair from the surgery site.  Do not shave the incision site unless you are given specific instructions to do so.    ONLY if you have been diagnosed with sleep apnea please bring your C-PAP machine.  ONLY if you wear home oxygen please bring your portable oxygen tank the day of your procedure.  ONLY if you have a history of OPEN HEART SURGERY you will need a clearance from your Cardiologist per Anesthesia.      ONLY for patients requiring bowel prep, written instructions will be given by your doctor's office.  ONLY if you have a neuro stimulator, please bring the controller with you the morning of surgery  ONLY if a type and screen test is needed before surgery, please return: done today  If your doctor has scheduled you for an overnight stay, bring a small overnight bag with any personal items you need.  Make arrangements in advance for transportation home by a responsible adult.  It is not safe to drive a vehicle during the 24 hours after anesthesia.       All [facility] facilities and properties are tobacco free.  Smoking is NOT allowed.      COVID TESTING SHOULD BE DONE THROUGH OUR DRIVE THROUGH TESTING AREA 48 HOURS PRIOR TO YOUR PROCEDURE.  THIS IS LOCATED NEXT TO THE EMERGENCY ROOM.    CONTACT NUMBER: [phone]    ONE SPOUSE/PARTNER OR SUPPORT PERSON MAY REMAIN WITH THE PATIENT PRIOR TO SURGERY AND MUST THEN WAIT IN A SOCIALLY DISTANT MANNER UNTIL A MEMBER OF THE SURGERY TEAM PROVIDES AN UPDATE AT THE CONCLUSION OF SURGERY.  IF THE PATIENT IS BEING DISCHARGED HOME THE PATIENT AND VISITOR MAY TRAVEL HOME SAFELY TOGETHER.  IF THE PATIENT IS BEING ADMITTED TO THE HOSPITAL AFTER SURGERY, VISITORS WILL ONLY BE ALLOWED TO REMAIN WITH THE PATIENT IF THEY MEET VISITOR CRITERIA FOR INPATIENT UNITS.    If you have any questions about  these instructions, call Pre-Op Admit  Nursing at [phone] or the Pre-Op Day Surgery Unit at [phone].

## 2020-06-08 NOTE — PRE ADMISSION SCREENING
"               [facility] Risk Assessment Scale    Patient Name: Qiana Collins  YOB: 1975  MRN: [id number]            RIsk Factor Measure Recommendation Patient Data Scale/Score   BMI >40 Reconsider surgery, weight loss   Estimated body mass index is 42.83 kg/m² as calculated from the following:    Height as of this encounter: 5' 9" (1.753 m).    Weight as of this encounter: 131.5 kg (290 lb).   [] 0 = 1 - 24.9  [] 1 = 25-29.9  [] 2 = 30-34.9  [] 3 = 35-39.9  [x] 4 = 40-44.9  [] 5 = 45-99.9   Hemoglobin AIC (if applicable) >9 Delay surgery until DM under control  Refer for:  · Nutrition Therapy  · Exercise   · Medication    No results found for: LABA1C, HGBA1C    Lab Results   Component Value Date     (H) 06/08/2020      [] 0 = 4.0-5.6  [] 1 = 5.7-6.4  [] 2 = 6.5-6.9  [] 3 = 7.0-7.9  [] 4 = 8.0-8.9  [] 5 = 9.0-12   Hemoglobin (Anemia) <9 Delay surgery   Correct anemia Lab Results   Component Value Date    HGB 14.9 06/08/2020    [] 20 - <9.0                    Albumin <3 Delay surgery &Workup Lab Results   Component Value Date    ALBUMIN 3.9 06/08/2020    [] 20 - <3.0   Smoking Cessation >4 Weeks Delay Surgery  Refer to OP Cessation Class    Former Smoker [] 20 - current smoker                                _____ PPD                    Hx of MI, PE, Arrhythmia, CVA, DVT <30 Days Delay Surgery    N/A [] 20      Infection Variable Delay surgery and re-evaluate   N/A [] 20 - recent/current infection     Depression (PHQ) >10 out of 27 Delay Surgery and re-evaluate  Medication  Counseling              [x] 0     []1     []2     []3      []4      [] 5                    (1-4)      (5-9)  (10-14)  (15-19)   (20-27)     Memory Impairment & Memory loss (Mini-Cog Screening Tool) Advanced dementia and/or Parkinson's Reconsider surgery     [x] 0     []1     []2     []3     []4     [] 5     Physical Conditioning (Modified AM-PAC Per Physical Therapy at [facility]) Unable to ambulate on day of surgery Delay surgery " and re-evaluate  Pre-Rehabilitation   (PT evaluation)       [x]  0   []4       []8     []12        []16     []20       (<20%)   (<40%)   (<60%)   (<80% )    (>80%)     Home Environment/Caregiver support  (Per /Navigator Interview)    Availability of basic services and/or approprate assistance during post-operative period Delay surgery and re-evaluate  Safe home environment  Health   1 week post-surgery  Transportation  availability  Ability to obtain DME/Medications post-op    [x] 0     []1     []2     []3     []4     [] 5  [x] 0     []1     []2     []3     []4     [] 5  [x] 0     []1     []2     []3     []4     [] 5  [x] 0     []1     []2     []3     []4     [] 5         MD Contact: Dr. Bob Comments:  Total Score:  4

## 2020-06-08 NOTE — PRE ADMISSION SCREENING
JOINT CAMP ASSESSMENT    Name Qiana Collins   MRN [id number]    Age/Sex 45 y.o. female    Surgeon Dr. Silver Bob   Joint Camp Date 2020   Surgery Date 6/15/2020   Procedure Right Knee Arthroplasty   Insurance Payor: MEDICAID / Plan: [facility] [employer] MEDICA[place] / Product Type: Managed Medicaid /    Care Team Patient Care Team:  Darien Odonnell MD as PCP - General (Internal Medicine)  Silver Bob MD as Consulting Physician (Orthopedic Surgery)    Pharmacy   [facility] - [place], [place] -  [address]  [address]  [place] [address]  Phone: [phone] Fax: [phone]     AM-PAC Score   23   Risk Assessment Score 4     Past Medical History:   Diagnosis Date    Asthma     Bone spur of other site     Diabetes mellitus, type 2     Hyperlipemia     Hypertension     Knee pain        Past Surgical History:   Procedure Laterality Date     SECTION      x2     hysterosocpy polyp removal      KNEE ARTHROSCOPY      TUBAL LIGATION      uterin ablastion           Home Enviroment     Living Arrangement: Lives with family  Home Environment: 1-story house/ trailer, number of outside stairs: 1, tub-shower  Home Safety Concerns: Pets in the home: dogs (1).    DISCHARGE CAREGIVER/SUPPORT SYSTEM     Identified post-op caregiver: Patient has children / family / friends to help, mother and father.  Patient's caregiver(s) will be able to provide physical assistance. Patient will have someone to assist overnight.      Caregiver present at pre-op interview:  No      PRE-OPERATIVE FUNCTIONAL STATUS     Employment: Unemployed    Pre-op Functional Status: Patient is independent with mobility/ambulation, transfers, ADL's, IADL's.    Use of assistive device for ambulation: none  ADL: self care  ADL Limitations: difficulty with walking  Medical Restrictions: Decreased range of motions in extremities    POTENTIAL BARRIERS TO DISCHARGE/POTENTIAL POST-OP COMPLICATIONS     Patient  with hx of obesity, HTN, asthma and type II diabetes.    DISCHARGE PLAN     Expected LOS of 2 days or less for joint replacement discussed with patient.     Patient in agreement with discharge plan: Yes    Discharge to: Outpatient PT and Home with 24 hour assistance     HH:  None per insurance coverage.     OP PT: [facility]     Home DME: none    Needed DME at D/C: rolling walker, bedside commode and tub transfer bench. Patient to purchase tub transfer bench from retail prior to surgery.     Rx: Per Dr. Bob at discharge.     Meds to Beds: N/A  Patient expected to discharge on Aspirin 325mg by mouth twice daily for DVT prophylaxis.

## 2020-06-10 DIAGNOSIS — Z96.651 STATUS POST TOTAL KNEE REPLACEMENT, RIGHT: Primary | ICD-10-CM

## 2020-06-10 LAB — MRSA SPEC QL CULT: NORMAL

## 2020-06-11 LAB — BACTERIA UR CULT: ABNORMAL

## 2020-06-12 ENCOUNTER — ANESTHESIA EVENT (OUTPATIENT)
Dept: SURGERY | Facility: HOSPITAL | Age: 45
End: 2020-06-12
Payer: MEDICAID

## 2020-06-13 ENCOUNTER — LAB VISIT (OUTPATIENT)
Dept: PRIMARY CARE CLINIC | Facility: CLINIC | Age: 45
End: 2020-06-13
Payer: MEDICAID

## 2020-06-13 DIAGNOSIS — M17.11 PRIMARY OSTEOARTHRITIS OF RIGHT KNEE: ICD-10-CM

## 2020-06-13 PROCEDURE — U0003 INFECTIOUS AGENT DETECTION BY NUCLEIC ACID (DNA OR RNA); SEVERE ACUTE RESPIRATORY SYNDROME CORONAVIRUS 2 (SARS-COV-2) (CORONAVIRUS DISEASE [COVID-19]), AMPLIFIED PROBE TECHNIQUE, MAKING USE OF HIGH THROUGHPUT TECHNOLOGIES AS DESCRIBED BY CMS-2020-01-R: HCPCS

## 2020-06-15 ENCOUNTER — ANESTHESIA (OUTPATIENT)
Dept: SURGERY | Facility: HOSPITAL | Age: 45
End: 2020-06-15
Payer: MEDICAID

## 2020-06-15 ENCOUNTER — HOSPITAL ENCOUNTER (OUTPATIENT)
Facility: HOSPITAL | Age: 45
Discharge: HOME | End: 2020-06-15
Attending: ORTHOPAEDIC SURGERY | Admitting: ORTHOPAEDIC SURGERY
Payer: MEDICAID

## 2020-06-15 VITALS
HEIGHT: 69 IN | OXYGEN SATURATION: 98 % | WEIGHT: 293 LBS | BODY MASS INDEX: 43.4 KG/M2 | HEART RATE: 58 BPM | SYSTOLIC BLOOD PRESSURE: 118 MMHG | DIASTOLIC BLOOD PRESSURE: 62 MMHG | RESPIRATION RATE: 18 BRPM | TEMPERATURE: 98 F

## 2020-06-15 DIAGNOSIS — M17.11 OSTEOARTHRITIS OF RIGHT KNEE: Primary | ICD-10-CM

## 2020-06-15 DIAGNOSIS — M17.11 PRIMARY OSTEOARTHRITIS OF RIGHT KNEE: ICD-10-CM

## 2020-06-15 LAB
AMORPH CRY URNS QL MICRO: ABNORMAL
B-HCG UR QL: NEGATIVE
BACTERIA #/AREA URNS HPF: ABNORMAL /HPF
BACTERIA #/AREA URNS HPF: ABNORMAL /HPF
BILIRUB UR QL STRIP: NEGATIVE
BILIRUB UR QL STRIP: NEGATIVE
CLARITY UR: ABNORMAL
CLARITY UR: CLEAR
COLOR UR: YELLOW
COLOR UR: YELLOW
CTP QC/QA: YES
GLUCOSE UR QL STRIP: NEGATIVE
GLUCOSE UR QL STRIP: NEGATIVE
HGB UR QL STRIP: NEGATIVE
HGB UR QL STRIP: NEGATIVE
KETONES UR QL STRIP: NEGATIVE
KETONES UR QL STRIP: NEGATIVE
LEUKOCYTE ESTERASE UR QL STRIP: ABNORMAL
LEUKOCYTE ESTERASE UR QL STRIP: ABNORMAL
MICROSCOPIC COMMENT: ABNORMAL
MICROSCOPIC COMMENT: ABNORMAL
NITRITE UR QL STRIP: NEGATIVE
NITRITE UR QL STRIP: NEGATIVE
PH UR STRIP: 6 [PH] (ref 5–8)
PH UR STRIP: 6 [PH] (ref 5–8)
PROT UR QL STRIP: NEGATIVE
PROT UR QL STRIP: NEGATIVE
SARS-COV-2 RNA RESP QL NAA+PROBE: NOT DETECTED
SP GR UR STRIP: 1.01 (ref 1–1.03)
SP GR UR STRIP: 1.02 (ref 1–1.03)
TRICHOMONAS UR QL MICRO: ABNORMAL
TRICHOMONAS UR QL MICRO: ABNORMAL
URN SPEC COLLECT METH UR: ABNORMAL
URN SPEC COLLECT METH UR: ABNORMAL
UROBILINOGEN UR STRIP-ACNC: NEGATIVE EU/DL
UROBILINOGEN UR STRIP-ACNC: NEGATIVE EU/DL
WBC #/AREA URNS HPF: 10 /HPF (ref 0–5)
WBC #/AREA URNS HPF: 18 /HPF (ref 0–5)

## 2020-06-15 PROCEDURE — S0020 INJECTION, BUPIVICAINE HYDRO: HCPCS | Performed by: ANESTHESIOLOGY

## 2020-06-15 PROCEDURE — 81025 URINE PREGNANCY TEST: CPT | Performed by: ORTHOPAEDIC SURGERY

## 2020-06-15 PROCEDURE — C9290 INJ, BUPIVACAINE LIPOSOME: HCPCS | Performed by: ANESTHESIOLOGY

## 2020-06-15 PROCEDURE — D9220A PRA ANESTHESIA: Mod: ANES,,, | Performed by: ANESTHESIOLOGY

## 2020-06-15 PROCEDURE — 36000706: Performed by: ORTHOPAEDIC SURGERY

## 2020-06-15 PROCEDURE — 64447 NJX AA&/STRD FEMORAL NRV IMG: CPT | Mod: 59,RT,, | Performed by: ANESTHESIOLOGY

## 2020-06-15 PROCEDURE — 64447 PR NERVE BLOCK INJ, ANES/STEROID, FEMORAL, INCL IMAG GUIDANCE: ICD-10-PCS | Mod: 59,RT,, | Performed by: ANESTHESIOLOGY

## 2020-06-15 PROCEDURE — D9220A PRA ANESTHESIA: ICD-10-PCS | Mod: CRNA,,, | Performed by: NURSE ANESTHETIST, CERTIFIED REGISTERED

## 2020-06-15 PROCEDURE — 63600175 PHARM REV CODE 636 W HCPCS: Performed by: ANESTHESIOLOGY

## 2020-06-15 PROCEDURE — 81000 URINALYSIS NONAUTO W/SCOPE: CPT | Mod: 91

## 2020-06-15 PROCEDURE — 25000003 PHARM REV CODE 250: Performed by: ANESTHESIOLOGY

## 2020-06-15 PROCEDURE — 63600175 PHARM REV CODE 636 W HCPCS: Performed by: NURSE ANESTHETIST, CERTIFIED REGISTERED

## 2020-06-15 PROCEDURE — 99900103 DSU ONLY-NO CHARGE-INITIAL HR (STAT): Performed by: ORTHOPAEDIC SURGERY

## 2020-06-15 PROCEDURE — 63600175 PHARM REV CODE 636 W HCPCS: Performed by: ORTHOPAEDIC SURGERY

## 2020-06-15 PROCEDURE — 99900104 DSU ONLY-NO CHARGE-EA ADD'L HR (STAT): Performed by: ORTHOPAEDIC SURGERY

## 2020-06-15 PROCEDURE — 25000003 PHARM REV CODE 250: Performed by: ORTHOPAEDIC SURGERY

## 2020-06-15 PROCEDURE — 87086 URINE CULTURE/COLONY COUNT: CPT | Mod: 59

## 2020-06-15 PROCEDURE — 25000003 PHARM REV CODE 250: Performed by: NURSE ANESTHETIST, CERTIFIED REGISTERED

## 2020-06-15 PROCEDURE — 64447 NJX AA&/STRD FEMORAL NRV IMG: CPT | Performed by: ANESTHESIOLOGY

## 2020-06-15 PROCEDURE — 36000707: Performed by: ORTHOPAEDIC SURGERY

## 2020-06-15 PROCEDURE — 76942 PR U/S GUIDANCE FOR NEEDLE GUIDANCE: ICD-10-PCS | Mod: 26,,, | Performed by: ANESTHESIOLOGY

## 2020-06-15 PROCEDURE — D9220A PRA ANESTHESIA: ICD-10-PCS | Mod: ANES,,, | Performed by: ANESTHESIOLOGY

## 2020-06-15 PROCEDURE — 27200750 HC INSULATED NEEDLE/ STIMUPLEX: Performed by: ANESTHESIOLOGY

## 2020-06-15 PROCEDURE — 63600175 PHARM REV CODE 636 W HCPCS

## 2020-06-15 PROCEDURE — 76942 ECHO GUIDE FOR BIOPSY: CPT | Mod: 26,,, | Performed by: ANESTHESIOLOGY

## 2020-06-15 PROCEDURE — D9220A PRA ANESTHESIA: Mod: CRNA,,, | Performed by: NURSE ANESTHETIST, CERTIFIED REGISTERED

## 2020-06-15 RX ORDER — ONDANSETRON 2 MG/ML
4 INJECTION INTRAMUSCULAR; INTRAVENOUS EVERY 12 HOURS PRN
Status: DISCONTINUED | OUTPATIENT
Start: 2020-06-15 | End: 2020-06-15 | Stop reason: HOSPADM

## 2020-06-15 RX ORDER — TRANEXAMIC ACID 100 MG/ML
INJECTION, SOLUTION INTRAVENOUS
Status: DISCONTINUED | OUTPATIENT
Start: 2020-06-15 | End: 2020-06-15

## 2020-06-15 RX ORDER — CEFAZOLIN SODIUM 2 G/50ML
2 SOLUTION INTRAVENOUS
Status: COMPLETED | OUTPATIENT
Start: 2020-06-15 | End: 2020-06-15

## 2020-06-15 RX ORDER — EPHEDRINE SULFATE 50 MG/ML
INJECTION, SOLUTION INTRAVENOUS
Status: DISCONTINUED | OUTPATIENT
Start: 2020-06-15 | End: 2020-06-15

## 2020-06-15 RX ORDER — SULFAMETHOXAZOLE AND TRIMETHOPRIM 800; 160 MG/1; MG/1
1 TABLET ORAL 2 TIMES DAILY
Qty: 10 TABLET | Refills: 0 | Status: SHIPPED | OUTPATIENT
Start: 2020-06-15 | End: 2020-06-20

## 2020-06-15 RX ORDER — FENTANYL CITRATE 50 UG/ML
25 INJECTION, SOLUTION INTRAMUSCULAR; INTRAVENOUS EVERY 5 MIN PRN
Status: DISCONTINUED | OUTPATIENT
Start: 2020-06-15 | End: 2020-06-15 | Stop reason: HOSPADM

## 2020-06-15 RX ORDER — DEXAMETHASONE SODIUM PHOSPHATE 4 MG/ML
INJECTION, SOLUTION INTRA-ARTICULAR; INTRALESIONAL; INTRAMUSCULAR; INTRAVENOUS; SOFT TISSUE
Status: DISCONTINUED | OUTPATIENT
Start: 2020-06-15 | End: 2020-06-15

## 2020-06-15 RX ORDER — LIDOCAINE HYDROCHLORIDE 20 MG/ML
INJECTION INTRAVENOUS
Status: DISCONTINUED | OUTPATIENT
Start: 2020-06-15 | End: 2020-06-15

## 2020-06-15 RX ORDER — ACETAMINOPHEN 10 MG/ML
INJECTION, SOLUTION INTRAVENOUS
Status: DISCONTINUED
Start: 2020-06-15 | End: 2020-06-15 | Stop reason: HOSPADM

## 2020-06-15 RX ORDER — CELECOXIB 100 MG/1
100 CAPSULE ORAL 2 TIMES DAILY
Status: DISCONTINUED | OUTPATIENT
Start: 2020-06-16 | End: 2020-06-15 | Stop reason: HOSPADM

## 2020-06-15 RX ORDER — ACETAMINOPHEN 10 MG/ML
1000 INJECTION, SOLUTION INTRAVENOUS ONCE
Status: COMPLETED | OUTPATIENT
Start: 2020-06-15 | End: 2020-06-15

## 2020-06-15 RX ORDER — FENTANYL CITRATE 50 UG/ML
INJECTION, SOLUTION INTRAMUSCULAR; INTRAVENOUS
Status: DISCONTINUED | OUTPATIENT
Start: 2020-06-15 | End: 2020-06-15

## 2020-06-15 RX ORDER — CELECOXIB 100 MG/1
200 CAPSULE ORAL ONCE
Status: DISCONTINUED | OUTPATIENT
Start: 2020-06-15 | End: 2020-06-15

## 2020-06-15 RX ORDER — PROPOFOL 10 MG/ML
VIAL (ML) INTRAVENOUS CONTINUOUS PRN
Status: DISCONTINUED | OUTPATIENT
Start: 2020-06-15 | End: 2020-06-15

## 2020-06-15 RX ORDER — OXYCODONE HYDROCHLORIDE 5 MG/1
5 TABLET ORAL
Status: DISCONTINUED | OUTPATIENT
Start: 2020-06-15 | End: 2020-06-15 | Stop reason: HOSPADM

## 2020-06-15 RX ORDER — CELECOXIB 100 MG/1
400 CAPSULE ORAL ONCE
Status: COMPLETED | OUTPATIENT
Start: 2020-06-15 | End: 2020-06-15

## 2020-06-15 RX ORDER — HYDROMORPHONE HYDROCHLORIDE 2 MG/ML
0.2 INJECTION, SOLUTION INTRAMUSCULAR; INTRAVENOUS; SUBCUTANEOUS EVERY 5 MIN PRN
Status: DISCONTINUED | OUTPATIENT
Start: 2020-06-15 | End: 2020-06-15 | Stop reason: HOSPADM

## 2020-06-15 RX ORDER — ACETAMINOPHEN 325 MG/1
650 TABLET ORAL EVERY 4 HOURS
Status: DISCONTINUED | OUTPATIENT
Start: 2020-06-15 | End: 2020-06-15

## 2020-06-15 RX ORDER — OXYCODONE HCL 10 MG/1
10 TABLET, FILM COATED, EXTENDED RELEASE ORAL ONCE
Status: COMPLETED | OUTPATIENT
Start: 2020-06-15 | End: 2020-06-15

## 2020-06-15 RX ORDER — BUPIVACAINE HYDROCHLORIDE 5 MG/ML
INJECTION, SOLUTION EPIDURAL; INTRACAUDAL
Status: DISCONTINUED | OUTPATIENT
Start: 2020-06-15 | End: 2020-06-15

## 2020-06-15 RX ORDER — OXYCODONE HYDROCHLORIDE 10 MG/1
10 TABLET ORAL
Status: DISCONTINUED | OUTPATIENT
Start: 2020-06-15 | End: 2020-06-15 | Stop reason: HOSPADM

## 2020-06-15 RX ORDER — PREGABALIN 75 MG/1
75 CAPSULE ORAL ONCE
Status: COMPLETED | OUTPATIENT
Start: 2020-06-15 | End: 2020-06-15

## 2020-06-15 RX ORDER — MIDAZOLAM HYDROCHLORIDE 1 MG/ML
INJECTION, SOLUTION INTRAMUSCULAR; INTRAVENOUS
Status: DISCONTINUED | OUTPATIENT
Start: 2020-06-15 | End: 2020-06-15

## 2020-06-15 RX ORDER — LIDOCAINE HYDROCHLORIDE 10 MG/ML
1 INJECTION, SOLUTION EPIDURAL; INFILTRATION; INTRACAUDAL; PERINEURAL ONCE
Status: COMPLETED | OUTPATIENT
Start: 2020-06-15 | End: 2020-06-15

## 2020-06-15 RX ADMIN — OXYCODONE HYDROCHLORIDE 10 MG: 10 TABLET, FILM COATED, EXTENDED RELEASE ORAL at 11:06

## 2020-06-15 RX ADMIN — FENTANYL CITRATE 50 MCG: 50 INJECTION, SOLUTION INTRAMUSCULAR; INTRAVENOUS at 12:06

## 2020-06-15 RX ADMIN — LIDOCAINE HYDROCHLORIDE 10 MG: 10 INJECTION, SOLUTION EPIDURAL; INFILTRATION; INTRACAUDAL; PERINEURAL at 11:06

## 2020-06-15 RX ADMIN — FENTANYL CITRATE 50 MCG: 50 INJECTION, SOLUTION INTRAMUSCULAR; INTRAVENOUS at 01:06

## 2020-06-15 RX ADMIN — BUPIVACAINE 20 ML: 13.3 INJECTION, SUSPENSION, LIPOSOMAL INFILTRATION at 11:06

## 2020-06-15 RX ADMIN — TRANEXAMIC ACID 1000 MG: 100 INJECTION, SOLUTION INTRAVENOUS at 01:06

## 2020-06-15 RX ADMIN — EPHEDRINE SULFATE 15 MG: 50 INJECTION, SOLUTION INTRAMUSCULAR; INTRAVENOUS; SUBCUTANEOUS at 01:06

## 2020-06-15 RX ADMIN — DEXAMETHASONE SODIUM PHOSPHATE 4 MG: 4 INJECTION, SOLUTION INTRAMUSCULAR; INTRAVENOUS at 01:06

## 2020-06-15 RX ADMIN — ONDANSETRON 4 MG: 2 INJECTION, SOLUTION INTRAMUSCULAR; INTRAVENOUS at 01:06

## 2020-06-15 RX ADMIN — ROPIVACAINE HYDROCHLORIDE: 5 INJECTION, SOLUTION EPIDURAL; INFILTRATION; PERINEURAL at 12:06

## 2020-06-15 RX ADMIN — MIDAZOLAM 2 MG: 1 INJECTION INTRAMUSCULAR; INTRAVENOUS at 11:06

## 2020-06-15 RX ADMIN — CELECOXIB 400 MG: 100 CAPSULE ORAL at 11:06

## 2020-06-15 RX ADMIN — ACETAMINOPHEN 1000 MG: 10 INJECTION, SOLUTION INTRAVENOUS at 12:06

## 2020-06-15 RX ADMIN — SODIUM CHLORIDE, SODIUM GLUCONATE, SODIUM ACETATE, POTASSIUM CHLORIDE, MAGNESIUM CHLORIDE, SODIUM PHOSPHATE, DIBASIC, AND POTASSIUM PHOSPHATE: .53; .5; .37; .037; .03; .012; .00082 INJECTION, SOLUTION INTRAVENOUS at 11:06

## 2020-06-15 RX ADMIN — PREGABALIN 75 MG: 75 CAPSULE ORAL at 11:06

## 2020-06-15 RX ADMIN — LIDOCAINE HYDROCHLORIDE 75 MG: 20 INJECTION, SOLUTION INTRAVENOUS at 01:06

## 2020-06-15 RX ADMIN — CEFAZOLIN SODIUM 2 G: 2 SOLUTION INTRAVENOUS at 01:06

## 2020-06-15 RX ADMIN — SODIUM CHLORIDE, SODIUM GLUCONATE, SODIUM ACETATE, POTASSIUM CHLORIDE, MAGNESIUM CHLORIDE, SODIUM PHOSPHATE, DIBASIC, AND POTASSIUM PHOSPHATE: .53; .5; .37; .037; .03; .012; .00082 INJECTION, SOLUTION INTRAVENOUS at 01:06

## 2020-06-15 RX ADMIN — BUPIVACAINE HYDROCHLORIDE 10 ML: 5 INJECTION, SOLUTION EPIDURAL; INTRACAUDAL; PERINEURAL at 11:06

## 2020-06-15 RX ADMIN — PROPOFOL 50 MCG/KG/MIN: 10 INJECTION, EMULSION INTRAVENOUS at 01:06

## 2020-06-15 NOTE — PLAN OF CARE
Pre op assessment  UA completed  Dr Bob aware of results will continue with the case and No duggan catheter/for case    Pt has a redness noted and large to left anterior chest wall  side Dr Bob is aware/

## 2020-06-15 NOTE — ANESTHESIA PREPROCEDURE EVALUATION
06/15/2020  Qiana Collins is a 45 y.o., female.    Anesthesia Evaluation    I have reviewed the Patient Summary Reports.    I have reviewed the Nursing Notes. I have reviewed the NPO Status.   I have reviewed the Medications.     Review of Systems  Cardiovascular:   Hypertension    Pulmonary:   Asthma    Renal/:  Renal/ Normal     Hepatic/GI:  Hepatic/GI Normal    Musculoskeletal:   Arthritis     Neurological:  Neurology Normal    Endocrine:   Diabetes        Physical Exam  General:  Morbid Obesity    Airway/Jaw/Neck:  Airway Findings: Mouth Opening: Normal Tongue: Normal  General Airway Assessment: Adult  Mallampati: IV  Improves to III with phonation.      Dental:  Dental Findings: In tact   Chest/Lungs:  Chest/Lungs Findings: Clear to auscultation, Normal Respiratory Rate         Mental Status:  Mental Status Findings:  Cooperative, Alert and Oriented         Anesthesia Plan  Type of Anesthesia, risks & benefits discussed:  Anesthesia Type:  general, regional, spinal  Patient's Preference:   Intra-op Monitoring Plan: standard ASA monitors  Intra-op Monitoring Plan Comments:   Post Op Pain Control Plan: peripheral nerve block and multimodal analgesia  Post Op Pain Control Plan Comments:   Induction:   IV and Inhalation  Beta Blocker:  Patient is not currently on a Beta-Blocker (No further documentation required).       Informed Consent: Patient understands risks and agrees with Anesthesia plan.  Questions answered. Anesthesia consent signed with patient.  ASA Score: 3     Day of Surgery Review of History & Physical:            Ready For Surgery From Anesthesia Perspective.

## 2020-06-15 NOTE — PLAN OF CARE
Upon taking a second look at patient's urinalysis results, Dr. Bob cancelled patient's case. Pt to be brought to PACU.

## 2020-06-15 NOTE — OP NOTE
Patient was taken to the operating room.  And spinal anesthesia was instituted.  I had previously being told and shown that the patient's UTI had resolved.  Unfortunately when I walked into the operating room updated results had demonstrated 18 white blood cells and a significant amount of bacteria.  This would indicate that the patient had a significant urinary tract infection.  In the interest of the patient's health and in the interest of not getting the implant infected I made the decision to not proceed with the total knee.  I felt that this was the safest thing for the patient.  Therefore the case was canceled.  It will be rescheduled after the UTI can be eradicated.

## 2020-06-15 NOTE — ANESTHESIA PROCEDURE NOTES
Peripheral Block    Patient location during procedure: pre-op   Block not for primary anesthetic.  Reason for block: at surgeon's request and post-op pain management   Post-op Pain Location: right knee  Start time: 6/15/2020 11:50 AM  Timeout: 6/15/2020 11:50 AM   End time: 6/15/2020 11:53 AM    Staffing  Authorizing Provider: Kwan Graff MD  Performing Provider: Kwan Graff MD    Preanesthetic Checklist  Completed: patient identified, site marked, surgical consent, pre-op evaluation, timeout performed, IV checked, risks and benefits discussed and monitors and equipment checked  Peripheral Block  Patient position: supine  Prep: ChloraPrep  Patient monitoring: heart rate, cardiac monitor, continuous pulse ox, continuous capnometry and frequent blood pressure checks  Block type: adductor canal  Laterality: right  Injection technique: single shot  Needle  Needle type: Stimuplex   Needle gauge: 21 G  Needle length: 4 in  Needle localization: anatomical landmarks and ultrasound guidance   -ultrasound image captured on disc.  Assessment  Injection assessment: negative aspiration, negative parasthesia and local visualized surrounding nerve  Paresthesia pain: none  Heart rate change: no  Slow fractionated injection: yes  Additional Notes  VSS.  [facility] RN monitoring vitals throughout procedure.  Patient tolerated procedure well. Exparel 20ml, 266mg and Bup 0.5% 10ml. No complications

## 2020-06-15 NOTE — ANESTHESIA POSTPROCEDURE EVALUATION
Anesthesia Post Evaluation    Patient: Qiana Collins    Procedure(s) Performed: Procedure(s) (LRB):  ARTHROPLASTY, KNEE (Right)    Final Anesthesia Type: spinal    Patient location during evaluation: PACU  Patient participation: Yes- Able to Participate  Level of consciousness: sedated and awake  Post-procedure vital signs: reviewed and stable  Pain management: adequate  Airway patency: patent    PONV status at discharge: No PONV  Anesthetic complications: no      Cardiovascular status: blood pressure returned to baseline  Respiratory status: spontaneous ventilation  Hydration status: euvolemic  Follow-up not needed.          Vitals Value Taken Time   /59 06/15/20 1630   Temp 36.6 °C (97.9 °F) 06/15/20 1430   Pulse 58 06/15/20 1631   Resp 22 06/15/20 1631   SpO2 100 % 06/15/20 1631   Vitals shown include unvalidated device data.      No case tracking events are documented in the log.      Pain/Brooke Score: Pain Rating Prior to Med Admin: 0 (6/15/2020  3:45 PM)  Pain Rating Post Med Admin: 0 (6/15/2020  3:45 PM)  Brooke Score: 10 (6/15/2020  3:45 PM)

## 2020-06-15 NOTE — PLAN OF CARE
Dr Bob viewed lab results called with d/c orders and  instr pt to  rx at [facility]

## 2020-06-15 NOTE — PLAN OF CARE
PT left upper lip ring removed (2 pieces) and placed in labeled cup to travel to  PACU  with patient.

## 2020-06-15 NOTE — PLAN OF CARE
"Dr geronimo was at  explaining poc to pt and why he could not do surgery today  Due to urine results called to or alsoexplained to  he had me send a cath specimen to lab and send for ua and culture did do as md ordered  Pt still has spinal wearing off a bit tearful"wants this to be over"  Pt does understand once explained to her by md and agrees   "

## 2020-06-15 NOTE — H&P
CC/Indication for Procedure: 45 y.o. female with Primary osteoarthritis of right knee [M17.11].    Patient scheduled for ND TOTAL KNEE ARTHROPLASTY [91871] (ARTHROPLASTY, KNEE).    Past Medical History:   Diagnosis Date    Asthma     Bone spur of other site     Diabetes mellitus, type 2     Hyperlipemia     Hypertension     Knee pain      Past Surgical History:   Procedure Laterality Date     SECTION      x2     hysterosocpy polyp removal      KNEE ARTHROSCOPY      TUBAL LIGATION      uterin ablastion       Family History   Problem Relation Age of Onset    Diabetes Mother     Dementia Mother     Diabetes Father     Heart disease Father      Social History     Socioeconomic History    Marital status: Single     Spouse name: Not on file    Number of children: Not on file    Years of education: Not on file    Highest education level: Not on file   Occupational History    Not on file   Social Needs    Financial resource strain: Not on file    Food insecurity     Worry: Not on file     Inability: Not on file    Transportation needs     Medical: Not on file     Non-medical: Not on file   Tobacco Use    Smoking status: Former Smoker     Packs/day: 0.50     Types: Cigarettes    Smokeless tobacco: Former User   Substance and Sexual Activity    Alcohol use: No    Drug use: Yes     Types: Marijuana     Comment: socially    Sexual activity: Yes     Partners: Male     Birth control/protection: None   Lifestyle    Physical activity     Days per week: Not on file     Minutes per session: Not on file    Stress: Not on file   Relationships    Social connections     Talks on phone: Not on file     Gets together: Not on file     Attends Zoroastrianism service: Not on file     Active member of club or organization: Not on file     Attends meetings of clubs or organizations: Not on file     Relationship status: Not on file   Other Topics Concern    Not on file   Social History Narrative    Not on file        Review of patient's allergies indicates:  No Known Allergies    No current facility-administered medications for this encounter.     ROS:    Denies chest pain or palpitations  Denies shortness of breath  Denies fevers or chills  Denies chest pain  Denies abdominal pain    PE:    General Appearance: Well nourished  Orientation: Oriented to time, place, person  Mental Status: Alert  Heart: RRR  Lungs: CTA  Abdomen: Soft and non-tender    Anesthesia/Surgery risks, benefits and alternative options discussed and understood by patient/family.    This note was created using Dragon voice recognition software that occasionally misinterpreted phrases or words.

## 2020-06-15 NOTE — DISCHARGE INSTRUCTIONS
Bupivacaine Liposomal Suspension for Injection  What is this medicine?  BUPIVACAINE LIPOSOMAL (bue PIV a jonas LIP oh tyra al) is an anesthetic. It causes loss of feeling in the skin or other tissues. It is used to prevent and to treat pain from some procedures.  How should I use this medicine?  This medicine is for injection into the affected area. It is given by a health care professional in a hospital or clinic setting.  Talk to your pediatrician regarding the use of this medicine in children. Special care may be needed.  What side effects may I notice from receiving this medicine?  Side effects that you should report to your doctor or health care professional as soon as possible:  · allergic reactions like skin rash, itching or hives, swelling of the face, lips, or tongue  · breathing problems  · changes in vision  · dizziness  · fast or slow, irregular heartbeat  · joint pain, stiffness, or loss of motion  · seizures  Side effects that usually do not require medical attention (Report these to your doctor or health care professional if they continue or are bothersome.):  · constipation  · irritation at site where injected  · nausea, vomiting  · tiredness  What may interact with this medicine?  · other anesthetics  What if I miss a dose?  This does not apply.  Where should I keep my medicine?  This drug is given in a hospital or clinic and will not be stored at home.  What should I tell my health care provider before I take this medicine?  They need to know if you have any of these conditions:  · heart disease  · kidney disease  · liver disease  · an unusual or allergic reaction to bupivacaine, other medicines, foods, dyes, or preservatives  · pregnant or trying to get pregnant  · breast-feeding  What should I watch for while using this medicine?  Your condition will be monitored carefully while you are receiving this medicine.  Be careful to avoid injury while the area is numb and you are not aware of pain.  Do  not use another medicine that contains bupivacaine for 96 hours after receiving bupivacaine liposomal. You may have more side effects. Talk to your doctor if you have questions.  NOTE:This sheet is a summary. It may not cover all possible information. If you have questions about this medicine, talk to your doctor, pharmacist, or health care provider. Copyright© 2017 [employer]    Post op instructions for prevention of DVT  What is deep vein thrombosis?  Deep vein thrombosis (DVT) is the medical term for blood clots in the deep veins of the leg.  These blood clots can be dangerous.  A DVT can block a blood vessel and keep blood from getting where it needs to go.  Another problem is that the clot can travel to other parts of the body such as the lungs.  A clot that travels to the lungs is called a pulmonary embolus (PE) and can cause serious problems with breathing which can lead to death.  Am I at risk for DVT/PE?  If you are not very active, you are at risk of DVT.  Anyone confined to bed, sitting for long periods of time, recovering from surgery, etc. increases the risk of DVT.  Other risk factors are cancer diagnosis, certain medications, estrogen replacement in any form,older age, obesity, pregnancy, smoking, history of clotting disorders, and dehydration.  How will I know if I have a DVT?   Swelling in the lower leg   Pain   Warmth, redness, hardness or bulging of the vein  If you have any of these symptoms, call your doctors office right away.  Some people will not have any symptoms until the clot moves to the lungs.  What are the symptoms of a PE?   Panting, shortness of breath, or trouble breathing   Sharp, knife-like chest pain when you breathe   Coughing or coughing up blood   Rapid heartbeat  If you have any of these symptoms or get worse quickly, call 911 for emergency treatment.  How can I prevent a DVT?   Avoid long periods of inactivity and dont cross your legs--get up and walk around  "every hour or so.   Stay active--walking after surgery is highly encouraged.  This means you should get out of the house and walk in the neighborhood.  Going up and down stairs will not impair healing (unless advised against such activity by your doctor).     Drink plenty of noncaffeinated, nonalcoholic fluids each day to prevent dehydration.   Wear special support stockings, if they have been advised by your doctor.   If you travel, stop at least once an hour and walk around.   Avoid smoking (assistance with stopping is available through your healthcare provider)  Always notify your doctor if you are not able to follow the post operative instructions that are given to you at the time of discharge.  It may be necessary to prescribe one of the medications available to prevent DVT.    Discharge Instructions: After Your Surgery/Procedure  Youve just had surgery. During surgery you were given medicine called anesthesia to keep you relaxed and free of pain. After surgery you may have some pain or nausea. This is common. Here are some tips for feeling better and getting well after surgery.     Stay on schedule with your medication.   Going home  Your doctor or nurse will show you how to take care of yourself when you go home. He or she will also answer your questions. Have an adult family member or friend drive you home.      For your safety we recommend these precaution for the first 24 hours after your procedure:  · Do not drive or use heavy equipment.  · Do not make important decisions or sign legal papers.  · Do not drink alcohol.  · Have someone stay with you, if needed. He or she can watch for problems and help keep you safe.  · Your concentration, balance, coordination, and judgement may be impaired for many hours after anesthesia.  Use caution when ambulating or standing up.     · You may feel weak and "washed out" after anesthesia and surgery.      Subtle residual effects of general anesthesia or sedation " with regional / local anesthesia can last more than 24 hours.  Rest for the remainder of the day or longer if your Doctor/Surgeon has advised you to do so.  Although you may feel normal within the first 24 hours, your reflexes and mental ability may be impaired without you realizing it.  You may feel dizzy, lightheaded or sleepy for 24 hours or longer.      Be sure to go to all follow-up visits with your doctor. And rest after your surgery for as long as your doctor tells you to.  Coping with pain  If you have pain after surgery, pain medicine will help you feel better. Take it as told, before pain becomes severe. Also, ask your doctor or pharmacist about other ways to control pain. This might be with heat, ice, or relaxation. And follow any other instructions your surgeon or nurse gives you.  Tips for taking pain medicine  To get the best relief possible, remember these points:  · Pain medicines can upset your stomach. Taking them with a little food may help.  · Most pain relievers taken by mouth need at least 20 to 30 minutes to start to work.  · Taking medicine on a schedule can help you remember to take it. Try to time your medicine so that you can take it before starting an activity. This might be before you get dressed, go for a walk, or sit down for dinner.  · Constipation is a common side effect of pain medicines. Call your doctor before taking any medicines such as laxatives or stool softeners to help ease constipation. Also ask if you should skip any foods. Drinking lots of fluids and eating foods such as fruits and vegetables that are high in fiber can also help. Remember, do not take laxatives unless your surgeon has prescribed them.  · Drinking alcohol and taking pain medicine can cause dizziness and slow your breathing. It can even be deadly. Do not drink alcohol while taking pain medicine.  · Pain medicine can make you react more slowly to things. Do not drive or run machinery while taking pain  medicine.  Your health care provider may tell you to take acetaminophen to help ease your pain. Ask him or her how much you are supposed to take each day. Acetaminophen or other pain relievers may interact with your prescription medicines or other over-the-counter (OTC) drugs. Some prescription medicines have acetaminophen and other ingredients. Using both prescription and OTC acetaminophen for pain can cause you to overdose. Read the labels on your OTC medicines with care. This will help you to clearly know the list of ingredients, how much to take, and any warnings. It may also help you not take too much acetaminophen. If you have questions or do not understand the information, ask your pharmacist or health care provider to explain it to you before you take the OTC medicine.  Managing nausea  Some people have an upset stomach after surgery. This is often because of anesthesia, pain, or pain medicine, or the stress of surgery. These tips will help you handle nausea and eat healthy foods as you get better. If you were on a special food plan before surgery, ask your doctor if you should follow it while you get better. These tips may help:  · Do not push yourself to eat. Your body will tell you when to eat and how much.  · Start off with clear liquids and soup. They are easier to digest.  · Next try semi-solid foods, such as mashed potatoes, applesauce, and gelatin, as you feel ready.  · Slowly move to solid foods. Dont eat fatty, rich, or spicy foods at first.  · Do not force yourself to have 3 large meals a day. Instead eat smaller amounts more often.  · Take pain medicines with a small amount of solid food, such as crackers or toast, to avoid nausea.     Call your surgeon if  · You still have pain an hour after taking medicine. The medicine may not be strong enough.  · You feel too sleepy, dizzy, or groggy. The medicine may be too strong.  · You have side effects like nausea, vomiting, or skin changes, such as  rash, itching, or hives.       If you have obstructive sleep apnea  You were given anesthesia medicine during surgery to keep you comfortable and free of pain. After surgery, you may have more apnea spells because of this medicine and other medicines you were given. The spells may last longer than usual.   At home:  · Keep using the continuous positive airway pressure (CPAP) device when you sleep. Unless your health care provider tells you not to, use it when you sleep, day or night. CPAP is a common device used to treat obstructive sleep apnea.  · Talk with your provider before taking any pain medicine, muscle relaxants, or sedatives. Your provider will tell you about the possible dangers of taking these medicines.  © 2866-0585 [employer]. [address] [place] [address], [place] [address]. All rights reserved. This information is not intended as a substitute for professional medical care. Always follow your healthcare professional's instructions.    General Information:    1.  Do not drink alcoholic beverages including beer for 24 hours or as long as you are on pain medication..  2.  Do not drive a motor vehicle, operate machinery or power tools, or signs legal papers for 24 hours or as long as you are on pain medication.   3.  You may experience light-headedness, dizziness, and sleepiness following surgery. Please do not stay alone. A responsible adult should be with you for this 24 hour period.  4.  Go home and rest.  5. Progress slowly to a normal diet unless instructed.  Otherwise, begin with liquids such as soft drinks, then soup and crackers working up to solid foods. Drink plenty of nonalcoholic fluids.  6.  Certain anesthetics and pain medications produce nausea and vomiting in certain individuals. If nausea becomes a problem at home, call you doctor.  7. A nurse will be calling you sometime after surgery. Do not be alarmed. This is our way of finding out how you are doing.  8. Several times every  hour while you are awake, take 2-3 deep breaths and cough. If you had stomach surgery hold a pillow or rolled towel firmly against your stomach before you cough. This will help with any pain the cough might cause.  9. Several times every hour while you are awake, pump and flex your feet 5-6 times and do foot circles. This will help prevent blood clots.  10.Call your doctor for severe pain, bleeding, fever, or signs or symptoms of infection (pain, swelling, redness, foul odor, drainage).    We hope your stay was comfortable as you heal now, mend and rest.    For we have enjoyed taking care of you by giving your our best.    And as you get better, by regaining your health and strength;   We count it as a privilege to have served you and hope your time at [facility] was well spent.      Thank  You!!!

## 2020-06-15 NOTE — OR NURSING
Yanely called from DR Bob room, urine results noted by DR finger okay to proceed with surgery, inform circulating nurse to not place duggan during case.

## 2020-06-15 NOTE — PLAN OF CARE
Called dr Bob since meds were not at [facility] [address] was told to call him back later since he was in middle of a case at [facility] informed [facility] before heading there to make sure rx is ready and to take all of it as ordered

## 2020-06-15 NOTE — ANESTHESIA PROCEDURE NOTES
Spinal    Diagnosis: right total knee  Patient location during procedure: OR  Start time: 6/15/2020 12:46 PM  Timeout: 6/15/2020 12:46 PM  End time: 6/15/2020 1:00 PM    Staffing  Authorizing Provider: Kwan Graff MD  Performing Provider: Kwan Graff MD    Spinal Block  Patient position: sitting  Prep: Betadine and ChloraPrep  Patient monitoring: heart rate, continuous pulse ox, frequent blood pressure checks and cardiac monitor  Approach: left paramedian  Location: L2-3  Injection technique: single shot  CSF Fluid: clear free-flowing CSF  Needle  Needle type: Zuleyka   Needle gauge: 24 G  Needle length: 5 in  Additional Documentation: left transient paresthesia, no paresthesia on injection, negative aspiration for heme and incremental injection  Needle localization: anatomical landmarks  Assessment  Sensory level: T10   Dermatomal levels determined by alcohol wipe  Ease of block: difficult  Patient's tolerance of the procedure: comfortable throughout block and no complaints  Additional Notes  Attempted 3.5 inch 24G Zuleyka.  Switched to 5 inch Zuleyka - positive left then right sided paresthesia.  +csf after +luis alfredo, negative heme or paresthesia on Marcaine 15mg injection.

## 2020-06-15 NOTE — TRANSFER OF CARE
"Anesthesia Transfer of Care Note    Patient: Qiana Collins    Procedure(s) Performed: Procedure(s) (LRB):  ARTHROPLASTY, KNEE (Right)    Patient location: PACU    Anesthesia Type: spinal and MAC    Transport from OR: Transported from OR on 2-3 L/min O2 by NC with adequate spontaneous ventilation    Post pain: adequate analgesia    Post assessment: no apparent anesthetic complications    Post vital signs: stable    Level of consciousness: awake    Nausea/Vomiting: no nausea/vomiting    Complications: none    Transfer of care protocol was followed      Last vitals:   Visit Vitals  /64   Pulse 62   Temp 36.7 °C (98.1 °F) (Oral)   Resp 20   Ht 5' 9" (1.753 m)   Wt 133.8 kg (295 lb)   SpO2 95%   Breastfeeding No   BMI 43.56 kg/m²     "

## 2020-06-15 NOTE — PLAN OF CARE
Dr Graff  Released from pacu to phase 2 pt able to move toes and legs  instr pt not to get oob without help skin w+d awaiting dr finger to call  In rx for her     pt has lip ring that was  Taken out in or in jar and given back to pt report to Sushila hector

## 2020-06-16 DIAGNOSIS — N39.0 URINARY TRACT INFECTION WITHOUT HEMATURIA, SITE UNSPECIFIED: Primary | ICD-10-CM

## 2020-06-16 LAB — BACTERIA UR CULT: NO GROWTH

## 2020-06-16 NOTE — PLAN OF CARE
Pt left the discharge instructions in the post op area, called her at home and she will be coming tomorrow to  her prescriptions I told her I will leave them in pre op and she justs needs to call pre op in the morning and we will gladly bring her the instructions   She said she can see them in her [facility] portal

## 2020-06-16 NOTE — PLAN OF CARE
Pt is able to feel her legs and her feel  She has numbness around the right knee area anterior and explained the perineural block and the medication/ and its effects  She was able to walk and balance without holding on about 3 feet and turn around and walk back    Spoke with Dr Graff and updated him on the pt activity and balance and able to walk without difficulty.  Pt is calm  Discussed information in the discharge packet regarding her antibiotic and to call Dr Paul office to reschedule.  She said she is hoping that the surgery can be reschedules next week.      Pt VSS  Pt denies nausea    Pt states her left knee hurts which it did prior to coming in this morning pain level   5/10      Discharge instructions were given to the pt and her father and mother.  All questions were answered and concerns addressed.

## 2020-06-17 LAB
BACTERIA UR CULT: NORMAL
BACTERIA UR CULT: NORMAL

## 2020-06-22 ENCOUNTER — TELEPHONE (OUTPATIENT)
Dept: ORTHOPEDICS | Facility: CLINIC | Age: 45
End: 2020-06-22

## 2020-06-22 NOTE — TELEPHONE ENCOUNTER
Spoke with pt, she states her leg is still umb from the nerve block last week. I will speak with a dcotor and get back with her.

## 2020-06-23 ENCOUNTER — LAB VISIT (OUTPATIENT)
Dept: LAB | Facility: HOSPITAL | Age: 45
End: 2020-06-23
Attending: ORTHOPAEDIC SURGERY
Payer: MEDICAID

## 2020-06-23 ENCOUNTER — OFFICE VISIT (OUTPATIENT)
Dept: ORTHOPEDICS | Facility: CLINIC | Age: 45
End: 2020-06-23
Payer: MEDICAID

## 2020-06-23 VITALS — WEIGHT: 293 LBS | SYSTOLIC BLOOD PRESSURE: 118 MMHG | BODY MASS INDEX: 43.56 KG/M2 | DIASTOLIC BLOOD PRESSURE: 78 MMHG

## 2020-06-23 DIAGNOSIS — N39.0 URINARY TRACT INFECTION WITHOUT HEMATURIA, SITE UNSPECIFIED: ICD-10-CM

## 2020-06-23 DIAGNOSIS — M17.11 PRIMARY OSTEOARTHRITIS OF RIGHT KNEE: Primary | ICD-10-CM

## 2020-06-23 LAB
BACTERIA #/AREA URNS HPF: ABNORMAL /HPF
BILIRUB UR QL STRIP: NEGATIVE
CLARITY UR: CLEAR
COLOR UR: YELLOW
GLUCOSE UR QL STRIP: NEGATIVE
HGB UR QL STRIP: ABNORMAL
KETONES UR QL STRIP: NEGATIVE
LEUKOCYTE ESTERASE UR QL STRIP: ABNORMAL
MICROSCOPIC COMMENT: ABNORMAL
NITRITE UR QL STRIP: NEGATIVE
PH UR STRIP: 6 [PH] (ref 5–8)
PROT UR QL STRIP: NEGATIVE
RBC #/AREA URNS HPF: 10 /HPF (ref 0–4)
SP GR UR STRIP: >=1.03 (ref 1–1.03)
SQUAMOUS #/AREA URNS HPF: 5 /HPF
URN SPEC COLLECT METH UR: ABNORMAL
UROBILINOGEN UR STRIP-ACNC: NEGATIVE EU/DL
WBC #/AREA URNS HPF: 48 /HPF (ref 0–5)

## 2020-06-23 PROCEDURE — 81000 URINALYSIS NONAUTO W/SCOPE: CPT

## 2020-06-23 PROCEDURE — 99213 PR OFFICE/OUTPT VISIT, EST, LEVL III, 20-29 MIN: ICD-10-PCS | Mod: S$GLB,,, | Performed by: ORTHOPAEDIC SURGERY

## 2020-06-23 PROCEDURE — 99213 OFFICE O/P EST LOW 20 MIN: CPT | Mod: S$GLB,,, | Performed by: ORTHOPAEDIC SURGERY

## 2020-06-23 NOTE — PROGRESS NOTES
[facility]    Subjective:     Chief Complaint:   Chief Complaint   Patient presents with    Right Knee - Pain      right leg numbness after right knee surgery cancelled. She was given a nerve block 6-15 and the numbness never wore off from upper thigh to ankle.       Past Medical History:   Diagnosis Date    Asthma     Bone spur of other site     Diabetes mellitus, type 2     Hyperlipemia     Hypertension     Knee pain        Past Surgical History:   Procedure Laterality Date     SECTION      x2     hysterosocpy polyp removal      KNEE ARTHROSCOPY      TUBAL LIGATION      uterin ablastion         Current Outpatient Medications   Medication Sig    albuterol (PROVENTIL/VENTOLIN HFA) 90 mcg/actuation inhaler Rescue    benzoyl peroxide (BENZAC AC) 10 % external wash WASH back nightly as tolerated    blood sugar diagnostic (SKYLER BLOOD GLUCOSE TEST STRIP) Strp 1 strip by Misc.(Non-Drug; Combo Route) route once daily.    blood-glucose meter kit Use as instructed    celecoxib (CELEBREX) 200 MG capsule Take 1 capsule (200 mg total) by mouth 2 (two) times daily.    clotrimazole-betamethasone 1-0.05% (LOTRISONE) cream APPLY topically TWICE DAILY    FLOVENT  mcg/actuation inhaler Inhale 2 puffs into the lungs 2 (two) times daily.    furosemide (LASIX) 40 MG tablet Take 1 tablet (40 mg total) by mouth every morning.    gabapentin (NEURONTIN) 600 MG tablet TAKE 1 TABLET BY MOUTH 3 TIMES DAILY AS NEEDED FOR NERVE PAIN    lancets 33 gauge Misc 1 lancet by Misc.(Non-Drug; Combo Route) route once daily.    lisinopril 10 MG tablet Take 1 tablet (10 mg total) by mouth every morning.    melatonin 10 mg Tab Take 1 tablet by mouth every evening.    metFORMIN (GLUCOPHAGE) 500 MG tablet Take 1 tablet (500 mg total) by mouth 2 (two) times daily.    metroNIDAZOLE (FLAGYL) 500 MG tablet Take 1 tablet (500 mg total) by mouth every 12 (twelve) hours.    mupirocin (BACTROBAN) 2 % ointment  Apply topically 2 (two) times daily.    neomycin-polymyxin-hydrocortisone (CORTISPORIN) otic solution Place 3 drops into both ears 3 (three) times daily.    nystatin (MYCOSTATIN) powder APPLY TO THE affected AREA TWICE DAILY    potassium chloride (MICRO-K) 10 MEQ CpSR Take 1 capsule (10 mEq total) by mouth once daily.    salmeterol (SEREVENT DISKUS) 50 mcg/dose diskus inhaler INHALE 1 PUFFS INTO THE LUNGS EVERY 12 HOURS    tiZANidine (ZANAFLEX) 4 MG tablet     triamcinolone acetonide 0.5% (KENALOG) 0.5 % Crea APPLY TO THE affected AREA of face TWICE DAILY     No current facility-administered medications for this visit.        Review of patient's allergies indicates:  No Known Allergies    Family History   Problem Relation Age of Onset    Diabetes Mother     Dementia Mother     Diabetes Father     Heart disease Father        Social History     Socioeconomic History    Marital status: Single     Spouse name: Not on file    Number of children: Not on file    Years of education: Not on file    Highest education level: Not on file   Occupational History    Not on file   Social Needs    Financial resource strain: Not on file    Food insecurity     Worry: Not on file     Inability: Not on file    Transportation needs     Medical: Not on file     Non-medical: Not on file   Tobacco Use    Smoking status: Former Smoker     Packs/day: 0.50     Types: Cigarettes    Smokeless tobacco: Former User   Substance and Sexual Activity    Alcohol use: No    Drug use: Yes     Types: Marijuana     Comment: socially/pain    Sexual activity: Yes     Partners: Male     Birth control/protection: None   Lifestyle    Physical activity     Days per week: Not on file     Minutes per session: Not on file    Stress: Not on file   Relationships    Social connections     Talks on phone: Not on file     Gets together: Not on file     Attends [employer]ism service: Not on file     Active member of club or organization: Not on file      Attends meetings of clubs or organizations: Not on file     Relationship status: Not on file   Other Topics Concern    Not on file   Social History Narrative    Not on file       History of present illness:  Patient comes in today for her right lower extremity.  She was scheduled for her total knee in fact had her blocks in.  The UA was initially reported as clean but after blocks were done review of the UA demonstrated that she still had a UTI.  We therefore canceled her surgery.  She states that she has some numbness in her leg as a result of the block.      Review of Systems:    Constitution: Negative for chills, fever, and sweats.  Negative for unexplained weight loss.    HENT:  Negative for headaches and blurry vision.    Cardiovascular:Negative for chest pain or irregular heart beat. Negative for hypertension.    Respiratory:  Negative for cough and shortness of breath.    Gastrointestinal: Negative for abdominal pain, heartburn, melena, nausea, and vomitting.    Genitourinary:  Negative bladder incontinence and dysuria.    Musculoskeletal:  See HPI for details.     Neurological: Negative for numbness.    Psychiatric/Behavioral: Negative for depression.  The patient is not nervous/anxious.      Endocrine: Negative for polyuria    Hematologic/Lymphatic: Negative for bleeding problem.  Does not bruise/bleed easily.    Skin: Negative for poor would healing and rash    Objective:      Physical Examination:    Vital Signs:    Vitals:    06/23/20 1045   BP: 118/78       Body mass index is 43.56 kg/m².    This a well-developed, well nourished patient in no acute distress.  They are alert and oriented and cooperative to examination.        Wounds are clean dry intact at the puncture site of the block.  She does have some decreased sensation subjectively distal to the patella on the medial aspect.  Pertinent New Results:    XRAY Report / Interpretation:       Assessment/Plan:      I spoke with Dr. Graff the  anesthesiologist who did the block.  He states that some numbness in that region is quite typical for an adductor block.  States that it is not unusual for that to last sometimes 2 weeks.  Patient states that the numbness is getting better.  She wants to have her knee replacement as scheduled in 2 weeks.  We do have a new UA pending.  All this is discussed with her in great detail.  We will check her back in a week to make sure that that numbness is going away.  I was very honest with her as to what had happened in why she had the block done and why her surgery was not done.  She understood.  She will follow-up in a week      This note was created using Dragon voice recognition software that occasionally misinterpreted phrases or words.

## 2020-06-24 ENCOUNTER — TELEPHONE (OUTPATIENT)
Dept: ORTHOPEDICS | Facility: CLINIC | Age: 45
End: 2020-06-24

## 2020-06-24 DIAGNOSIS — N39.0 URINARY TRACT INFECTION WITHOUT HEMATURIA, SITE UNSPECIFIED: Primary | ICD-10-CM

## 2020-06-24 RX ORDER — CIPROFLOXACIN 500 MG/1
500 TABLET ORAL
COMMUNITY
Start: 2020-06-24 | End: 2020-07-01

## 2020-06-24 NOTE — TELEPHONE ENCOUNTER
pt informed of date & time for repeat urinalysis as well as repeat Covid. New medication called in to [facility], spoke with Tatyana

## 2020-06-25 DIAGNOSIS — E11.9 TYPE 2 DIABETES MELLITUS WITHOUT COMPLICATION, UNSPECIFIED WHETHER LONG TERM INSULIN USE: ICD-10-CM

## 2020-06-30 ENCOUNTER — OFFICE VISIT (OUTPATIENT)
Dept: ORTHOPEDICS | Facility: CLINIC | Age: 45
End: 2020-06-30
Payer: MEDICAID

## 2020-06-30 VITALS
WEIGHT: 293 LBS | BODY MASS INDEX: 43.4 KG/M2 | HEART RATE: 63 BPM | DIASTOLIC BLOOD PRESSURE: 87 MMHG | SYSTOLIC BLOOD PRESSURE: 421 MMHG | HEIGHT: 69 IN

## 2020-06-30 DIAGNOSIS — M17.11 PRIMARY OSTEOARTHRITIS OF RIGHT KNEE: ICD-10-CM

## 2020-06-30 DIAGNOSIS — N39.0 URINARY TRACT INFECTION WITHOUT HEMATURIA, SITE UNSPECIFIED: Primary | ICD-10-CM

## 2020-06-30 PROCEDURE — 99213 OFFICE O/P EST LOW 20 MIN: CPT | Mod: S$GLB,,, | Performed by: ORTHOPAEDIC SURGERY

## 2020-06-30 PROCEDURE — 99213 PR OFFICE/OUTPT VISIT, EST, LEVL III, 20-29 MIN: ICD-10-PCS | Mod: S$GLB,,, | Performed by: ORTHOPAEDIC SURGERY

## 2020-06-30 NOTE — PROGRESS NOTES
[facility]    Subjective:     Chief Complaint:   Chief Complaint   Patient presents with    Right Knee - Pain     Right knee f/u. States that the numbness is better, she has feeling back in her leg.        Past Medical History:   Diagnosis Date    Asthma     Bone spur of other site     Diabetes mellitus, type 2     Hyperlipemia     Hypertension     Knee pain        Past Surgical History:   Procedure Laterality Date     SECTION      x2     hysterosocpy polyp removal      KNEE ARTHROSCOPY      TUBAL LIGATION      uterin ablastion         Current Outpatient Medications   Medication Sig    albuterol (PROVENTIL/VENTOLIN HFA) 90 mcg/actuation inhaler Rescue    benzoyl peroxide (BENZAC AC) 10 % external wash WASH back nightly as tolerated    blood sugar diagnostic (SKYLER BLOOD GLUCOSE TEST STRIP) Strp 1 strip by Misc.(Non-Drug; Combo Route) route once daily.    blood-glucose meter kit Use as instructed    celecoxib (CELEBREX) 200 MG capsule Take 1 capsule (200 mg total) by mouth 2 (two) times daily.    ciprofloxacin HCl (CIPRO) 500 MG tablet Take 500 mg by mouth every 12 (twelve) hours.    clotrimazole-betamethasone 1-0.05% (LOTRISONE) cream APPLY topically TWICE DAILY    FLOVENT  mcg/actuation inhaler Inhale 2 puffs into the lungs 2 (two) times daily.    furosemide (LASIX) 40 MG tablet Take 1 tablet (40 mg total) by mouth every morning.    gabapentin (NEURONTIN) 600 MG tablet TAKE 1 TABLET BY MOUTH 3 TIMES DAILY AS NEEDED FOR NERVE PAIN    lancets 33 gauge Misc 1 lancet by Misc.(Non-Drug; Combo Route) route once daily.    lisinopril 10 MG tablet Take 1 tablet (10 mg total) by mouth every morning.    melatonin 10 mg Tab Take 1 tablet by mouth every evening.    metFORMIN (GLUCOPHAGE) 500 MG tablet Take 1 tablet (500 mg total) by mouth 2 (two) times daily.    metroNIDAZOLE (FLAGYL) 500 MG tablet Take 1 tablet (500 mg total) by mouth every 12 (twelve) hours.    mupirocin  (BACTROBAN) 2 % ointment Apply topically 2 (two) times daily.    neomycin-polymyxin-hydrocortisone (CORTISPORIN) otic solution Place 3 drops into both ears 3 (three) times daily.    nystatin (MYCOSTATIN) powder APPLY TO THE affected AREA TWICE DAILY    potassium chloride (MICRO-K) 10 MEQ CpSR Take 1 capsule (10 mEq total) by mouth once daily.    salmeterol (SEREVENT DISKUS) 50 mcg/dose diskus inhaler INHALE 1 PUFFS INTO THE LUNGS EVERY 12 HOURS    tiZANidine (ZANAFLEX) 4 MG tablet     triamcinolone acetonide 0.5% (KENALOG) 0.5 % Crea APPLY TO THE affected AREA of face TWICE DAILY     No current facility-administered medications for this visit.        Review of patient's allergies indicates:  No Known Allergies    Family History   Problem Relation Age of Onset    Diabetes Mother     Dementia Mother     Diabetes Father     Heart disease Father        Social History     Socioeconomic History    Marital status: Single     Spouse name: Not on file    Number of children: Not on file    Years of education: Not on file    Highest education level: Not on file   Occupational History    Not on file   Social Needs    Financial resource strain: Not on file    Food insecurity     Worry: Not on file     Inability: Not on file    Transportation needs     Medical: Not on file     Non-medical: Not on file   Tobacco Use    Smoking status: Former Smoker     Packs/day: 0.50     Types: Cigarettes    Smokeless tobacco: Former User   Substance and Sexual Activity    Alcohol use: No    Drug use: Yes     Types: Marijuana     Comment: socially/pain    Sexual activity: Yes     Partners: Male     Birth control/protection: None   Lifestyle    Physical activity     Days per week: Not on file     Minutes per session: Not on file    Stress: Not on file   Relationships    Social connections     Talks on phone: Not on file     Gets together: Not on file     Attends Voodoo service: Not on file     Active member of club or  organization: Not on file     Attends meetings of clubs or organizations: Not on file     Relationship status: Not on file   Other Topics Concern    Not on file   Social History Narrative    Not on file       History of present illness:  Patient returns today for the right knee a part of her preop evaluation.  She no longer is having any decreased sensation in the right lower extremity.  No sensations of numbness or tingling.      Review of Systems:    Constitution: Negative for chills, fever, and sweats.  Negative for unexplained weight loss.    HENT:  Negative for headaches and blurry vision.    Cardiovascular:Negative for chest pain or irregular heart beat. Negative for hypertension.    Respiratory:  Negative for cough and shortness of breath.    Gastrointestinal: Negative for abdominal pain, heartburn, melena, nausea, and vomitting.    Genitourinary:  Negative bladder incontinence and dysuria.    Musculoskeletal:  See HPI for details.     Neurological: Negative for numbness.    Psychiatric/Behavioral: Negative for depression.  The patient is not nervous/anxious.      Endocrine: Negative for polyuria    Hematologic/Lymphatic: Negative for bleeding problem.  Does not bruise/bleed easily.    Skin: Negative for poor would healing and rash    Objective:      Physical Examination:    Vital Signs:    Vitals:    06/30/20 1452   BP: (!) 421/87   Pulse: 63       Body mass index is 43.56 kg/m².    This a well-developed, well nourished patient in no acute distress.  They are alert and oriented and cooperative to examination.        Range of motion is 0-95 degrees.  Pertinent New Results:    XRAY Report / Interpretation:       Assessment/Plan:      Advanced arthritis right knee.  She will undergo a cath UA 3 days prior to surgical intervention.  Assuming that that is negative and that her COVID exam is negative we will proceed with her total knee arthroplasty.  She understands.  All the risks discussed with her again.  She  wishes to proceed      This note was created using Dragon voice recognition software that occasionally misinterpreted phrases or words.

## 2020-07-01 ENCOUNTER — ANESTHESIA EVENT (OUTPATIENT)
Dept: SURGERY | Facility: HOSPITAL | Age: 45
End: 2020-07-01
Payer: MEDICAID

## 2020-07-02 ENCOUNTER — TELEPHONE (OUTPATIENT)
Dept: ORTHOPEDICS | Facility: CLINIC | Age: 45
End: 2020-07-02

## 2020-07-02 ENCOUNTER — LAB VISIT (OUTPATIENT)
Dept: LAB | Facility: HOSPITAL | Age: 45
End: 2020-07-02
Attending: ORTHOPAEDIC SURGERY
Payer: MEDICAID

## 2020-07-02 ENCOUNTER — TELEPHONE (OUTPATIENT)
Dept: UROLOGY | Facility: CLINIC | Age: 45
End: 2020-07-02

## 2020-07-02 DIAGNOSIS — N39.0 URINARY TRACT INFECTION WITHOUT HEMATURIA, SITE UNSPECIFIED: ICD-10-CM

## 2020-07-02 DIAGNOSIS — Z01.818 PREOP TESTING: ICD-10-CM

## 2020-07-02 DIAGNOSIS — Z01.818 PREOP TESTING: Primary | ICD-10-CM

## 2020-07-02 LAB
BACTERIA #/AREA URNS HPF: ABNORMAL /HPF
BILIRUB UR QL STRIP: ABNORMAL
CLARITY UR: ABNORMAL
COLOR UR: YELLOW
GLUCOSE UR QL STRIP: NEGATIVE
HGB UR QL STRIP: ABNORMAL
KETONES UR QL STRIP: NEGATIVE
LEUKOCYTE ESTERASE UR QL STRIP: ABNORMAL
MICROSCOPIC COMMENT: ABNORMAL
NITRITE UR QL STRIP: NEGATIVE
PH UR STRIP: 6 [PH] (ref 5–8)
PROT UR QL STRIP: NEGATIVE
RBC #/AREA URNS HPF: 2 /HPF (ref 0–4)
SP GR UR STRIP: >=1.03 (ref 1–1.03)
SQUAMOUS #/AREA URNS HPF: 6 /HPF
TRICHOMONAS UR QL MICRO: ABNORMAL
URN SPEC COLLECT METH UR: ABNORMAL
UROBILINOGEN UR STRIP-ACNC: NEGATIVE EU/DL
WBC #/AREA URNS HPF: 30 /HPF (ref 0–5)

## 2020-07-02 PROCEDURE — 81000 URINALYSIS NONAUTO W/SCOPE: CPT

## 2020-07-02 PROCEDURE — 87086 URINE CULTURE/COLONY COUNT: CPT

## 2020-07-02 NOTE — TELEPHONE ENCOUNTER
Spoke with patient informed her our office is not accepting new medicaid patients. Number given to 's office. Patient verbally voiced understanding.

## 2020-07-02 NOTE — TELEPHONE ENCOUNTER
----- Message from Jorge Jeff sent at 7/2/2020 11:54 AM CDT -----  Regarding: appointment request  Patient called in and wanted to speak with someone at the office regarding scheduling an hospital f/u. She would like a call back from the office and can be reached at    [phone]

## 2020-07-02 NOTE — TELEPHONE ENCOUNTER
Urinalysis came back still has UTI. Called pt, canceled surgery for Monday. Spoke with pt and informed her to a urologist

## 2020-07-04 LAB
BACTERIA UR CULT: NORMAL
BACTERIA UR CULT: NORMAL

## 2020-07-06 ENCOUNTER — ANESTHESIA (OUTPATIENT)
Dept: SURGERY | Facility: HOSPITAL | Age: 45
End: 2020-07-06
Payer: MEDICAID

## 2020-08-04 ENCOUNTER — TELEPHONE (OUTPATIENT)
Dept: ORTHOPEDICS | Facility: CLINIC | Age: 45
End: 2020-08-04

## 2020-08-04 DIAGNOSIS — E11.9 NEW ONSET TYPE 2 DIABETES MELLITUS: ICD-10-CM

## 2020-08-04 DIAGNOSIS — B37.2 INTERTRIGINOUS CANDIDIASIS: ICD-10-CM

## 2020-08-04 DIAGNOSIS — M54.50 CHRONIC BILATERAL LOW BACK PAIN WITHOUT SCIATICA: ICD-10-CM

## 2020-08-04 DIAGNOSIS — L70.9 ACNE, UNSPECIFIED ACNE TYPE: ICD-10-CM

## 2020-08-04 DIAGNOSIS — I10 ESSENTIAL HYPERTENSION: ICD-10-CM

## 2020-08-04 DIAGNOSIS — G89.29 CHRONIC BILATERAL LOW BACK PAIN WITHOUT SCIATICA: ICD-10-CM

## 2020-08-04 DIAGNOSIS — H60.93 OTITIS EXTERNA OF BOTH EARS, UNSPECIFIED CHRONICITY, UNSPECIFIED TYPE: ICD-10-CM

## 2020-08-04 NOTE — TELEPHONE ENCOUNTER
----- Message from Mariel Skelton sent at 8/4/2020  2:12 PM CDT -----  [phone] Please call patient she is ready to schedule her surgery. Dr Bob

## 2020-08-05 RX ORDER — NYSTATIN 100000 [USP'U]/G
POWDER TOPICAL
Qty: 60 G | Refills: 1 | Status: SHIPPED | OUTPATIENT
Start: 2020-08-05 | End: 2020-10-14 | Stop reason: SDUPTHER

## 2020-08-05 RX ORDER — GABAPENTIN 600 MG/1
TABLET ORAL
Qty: 90 TABLET | Refills: 0 | Status: SHIPPED | OUTPATIENT
Start: 2020-08-05 | End: 2020-09-01

## 2020-08-05 RX ORDER — LISINOPRIL 10 MG/1
10 TABLET ORAL EVERY MORNING
Qty: 30 TABLET | Refills: 5 | Status: SHIPPED | OUTPATIENT
Start: 2020-08-05 | End: 2020-10-14

## 2020-08-05 RX ORDER — NEOMYCIN SULFATE, POLYMYXIN B SULFATE, HYDROCORTISONE 3.5; 10000; 1 MG/ML; [USP'U]/ML; MG/ML
3 SOLUTION/ DROPS AURICULAR (OTIC) 3 TIMES DAILY
Qty: 10 ML | Refills: 0 | Status: SHIPPED | OUTPATIENT
Start: 2020-08-05 | End: 2020-09-29

## 2020-08-05 RX ORDER — BENZOYL PEROXIDE 100 MG/ML
LIQUID TOPICAL
Qty: 142 G | Refills: 1 | Status: SHIPPED | OUTPATIENT
Start: 2020-08-05 | End: 2022-06-20

## 2020-08-05 RX ORDER — BLOOD-GLUCOSE METER,MOBILE DEV
1 EACH MISCELLANEOUS DAILY
Qty: 100 STRIP | Refills: 5 | Status: SHIPPED | OUTPATIENT
Start: 2020-08-05

## 2020-08-07 ENCOUNTER — TELEPHONE (OUTPATIENT)
Dept: ORTHOPEDICS | Facility: CLINIC | Age: 45
End: 2020-08-07

## 2020-08-07 DIAGNOSIS — M17.11 OSTEOARTHRITIS OF RIGHT KNEE: ICD-10-CM

## 2020-08-07 DIAGNOSIS — M17.11 PRIMARY OSTEOARTHRITIS OF RIGHT KNEE: Primary | ICD-10-CM

## 2020-08-07 RX ORDER — MUPIROCIN 20 MG/G
OINTMENT TOPICAL
Status: CANCELLED | OUTPATIENT
Start: 2020-08-07

## 2020-08-07 NOTE — TELEPHONE ENCOUNTER
----- Message from Adriana Hook sent at 8/7/2020  8:49 AM CDT -----  Contact: Erin moran/ Dr Vasques's office called to state she is taking responsibility for the issue from now on instead of Sandy. She will be faxing paperwork to you shortly and you can reach her at [phone].

## 2020-08-11 ENCOUNTER — HOSPITAL ENCOUNTER (OUTPATIENT)
Dept: PREADMISSION TESTING | Facility: HOSPITAL | Age: 45
Discharge: HOME | End: 2020-08-11
Attending: ORTHOPAEDIC SURGERY
Payer: MEDICAID

## 2020-08-11 DIAGNOSIS — M17.11 PRIMARY OSTEOARTHRITIS OF RIGHT KNEE: Primary | ICD-10-CM

## 2020-08-11 DIAGNOSIS — Z01.818 PREOP EXAMINATION: ICD-10-CM

## 2020-08-11 LAB
ABO + RH BLD: NORMAL
ALBUMIN SERPL BCP-MCNC: 3.9 G/DL (ref 3.5–5.2)
ALP SERPL-CCNC: 67 U/L (ref 55–135)
ALT SERPL W/O P-5'-P-CCNC: 35 U/L (ref 10–44)
ANION GAP SERPL CALC-SCNC: 11 MMOL/L (ref 8–16)
AST SERPL-CCNC: 19 U/L (ref 10–40)
BACTERIA #/AREA URNS HPF: ABNORMAL /HPF
BASOPHILS # BLD AUTO: 0.02 K/UL (ref 0–0.2)
BASOPHILS NFR BLD: 0.2 % (ref 0–1.9)
BILIRUB SERPL-MCNC: 0.4 MG/DL (ref 0.1–1)
BILIRUB UR QL STRIP: NEGATIVE
BLD GP AB SCN CELLS X3 SERPL QL: NORMAL
BUN SERPL-MCNC: 15 MG/DL (ref 6–20)
CALCIUM SERPL-MCNC: 9.6 MG/DL (ref 8.7–10.5)
CHLORIDE SERPL-SCNC: 107 MMOL/L (ref 95–110)
CLARITY UR: CLEAR
CO2 SERPL-SCNC: 25 MMOL/L (ref 23–29)
COLOR UR: YELLOW
CREAT SERPL-MCNC: 0.9 MG/DL (ref 0.5–1.4)
DIFFERENTIAL METHOD: ABNORMAL
EOSINOPHIL # BLD AUTO: 0.1 K/UL (ref 0–0.5)
EOSINOPHIL NFR BLD: 1.5 % (ref 0–8)
ERYTHROCYTE [DISTWIDTH] IN BLOOD BY AUTOMATED COUNT: 13.1 % (ref 11.5–14.5)
EST. GFR  (AFRICAN AMERICAN): >60 ML/MIN/1.73 M^2
EST. GFR  (NON AFRICAN AMERICAN): >60 ML/MIN/1.73 M^2
GLUCOSE SERPL-MCNC: 130 MG/DL (ref 70–110)
GLUCOSE UR QL STRIP: NEGATIVE
HCT VFR BLD AUTO: 46 % (ref 37–48.5)
HGB BLD-MCNC: 14.8 G/DL (ref 12–16)
HGB UR QL STRIP: NEGATIVE
IMM GRANULOCYTES # BLD AUTO: 0.03 K/UL (ref 0–0.04)
IMM GRANULOCYTES NFR BLD AUTO: 0.4 % (ref 0–0.5)
KETONES UR QL STRIP: NEGATIVE
LEUKOCYTE ESTERASE UR QL STRIP: ABNORMAL
LYMPHOCYTES # BLD AUTO: 2.5 K/UL (ref 1–4.8)
LYMPHOCYTES NFR BLD: 29.6 % (ref 18–48)
MCH RBC QN AUTO: 31.2 PG (ref 27–31)
MCHC RBC AUTO-ENTMCNC: 32.2 G/DL (ref 32–36)
MCV RBC AUTO: 97 FL (ref 82–98)
MICROSCOPIC COMMENT: ABNORMAL
MONOCYTES # BLD AUTO: 0.8 K/UL (ref 0.3–1)
MONOCYTES NFR BLD: 9.2 % (ref 4–15)
NEUTROPHILS # BLD AUTO: 5 K/UL (ref 1.8–7.7)
NEUTROPHILS NFR BLD: 59.1 % (ref 38–73)
NITRITE UR QL STRIP: NEGATIVE
NRBC BLD-RTO: 0 /100 WBC
PH UR STRIP: 6 [PH] (ref 5–8)
PLATELET # BLD AUTO: 238 K/UL (ref 150–350)
PMV BLD AUTO: 12.5 FL (ref 9.2–12.9)
POTASSIUM SERPL-SCNC: 4 MMOL/L (ref 3.5–5.1)
PROT SERPL-MCNC: 7.7 G/DL (ref 6–8.4)
PROT UR QL STRIP: NEGATIVE
RBC # BLD AUTO: 4.74 M/UL (ref 4–5.4)
SODIUM SERPL-SCNC: 143 MMOL/L (ref 136–145)
SP GR UR STRIP: >=1.03 (ref 1–1.03)
SQUAMOUS #/AREA URNS HPF: 2 /HPF
URN SPEC COLLECT METH UR: ABNORMAL
UROBILINOGEN UR STRIP-ACNC: NEGATIVE EU/DL
WBC # BLD AUTO: 8.52 K/UL (ref 3.9–12.7)
WBC #/AREA URNS HPF: 30 /HPF (ref 0–5)

## 2020-08-11 PROCEDURE — 81000 URINALYSIS NONAUTO W/SCOPE: CPT

## 2020-08-11 PROCEDURE — 36415 COLL VENOUS BLD VENIPUNCTURE: CPT

## 2020-08-11 PROCEDURE — 87086 URINE CULTURE/COLONY COUNT: CPT

## 2020-08-11 PROCEDURE — 99900104 DSU ONLY-NO CHARGE-EA ADD'L HR (STAT)

## 2020-08-11 PROCEDURE — 86850 RBC ANTIBODY SCREEN: CPT

## 2020-08-11 PROCEDURE — 85025 COMPLETE CBC W/AUTO DIFF WBC: CPT

## 2020-08-11 PROCEDURE — 80053 COMPREHEN METABOLIC PANEL: CPT

## 2020-08-11 PROCEDURE — 99900103 DSU ONLY-NO CHARGE-INITIAL HR (STAT)

## 2020-08-11 PROCEDURE — 87081 CULTURE SCREEN ONLY: CPT | Mod: 59

## 2020-08-11 NOTE — PRE ADMISSION SCREENING
"               [facility] Risk Assessment Scale    Patient Name: Qiana Collins  YOB: 1975  MRN: [id number]            RIsk Factor Measure Recommendation Patient Data Scale/Score   BMI >40 Reconsider surgery, weight loss   Estimated body mass index is 43.56 kg/m² as calculated from the following:    Height as of 6/30/20: 5' 9" (1.753 m).    Weight as of 6/30/20: 133.8 kg (295 lb).   [] 0 = 1 - 24.9  [] 1 = 25-29.9  [] 2 = 30-34.9  [] 3 = 35-39.9  [x] 4 = 40-44.9  [] 5 = 45-99.9   Hemoglobin AIC (if applicable) >9 Delay surgery until DM under control  Refer for:  · Nutrition Therapy  · Exercise   · Medication    No results found for: LABA1C, HGBA1C    Lab Results   Component Value Date     (H) 08/11/2020      [] 0 = 4.0-5.6  [] 1 = 5.7-6.4  [] 2 = 6.5-6.9  [] 3 = 7.0-7.9  [] 4 = 8.0-8.9  [] 5 = 9.0-12   Hemoglobin (Anemia) <9 Delay surgery   Correct anemia Lab Results   Component Value Date    HGB 14.8 08/11/2020    [] 20 - <9.0                    Albumin <3 Delay surgery &Workup Lab Results   Component Value Date    ALBUMIN 3.9 08/11/2020    [] 20 - <3.0   Smoking Cessation >4 Weeks Delay Surgery  Refer to OP Cessation Class    Former Smoker [] 20 - current smoker                                _____ PPD                    Hx of MI, PE, Arrhythmia, CVA, DVT <30 Days Delay Surgery    N/A [] 20      Infection Variable Delay surgery and re-evaluate   N/A [] 20 - recent/current infection     Depression (PHQ) >10 out of 27 Delay Surgery and re-evaluate  Medication  Counseling              [x] 0     []1     []2     []3      []4      [] 5                    (1-4)      (5-9)  (10-14)  (15-19)   (20-27)     Memory Impairment & Memory loss (Mini-Cog Screening Tool) Advanced dementia and/or Parkinson's Reconsider surgery     [x] 0     []1     []2     []3     []4     [] 5     Physical Conditioning (Modified AM-PAC Per Physical Therapy at [facility]) Unable to ambulate on day of surgery Delay surgery and " re-evaluate  Pre-Rehabilitation   (PT evaluation)       [x]  0   []4       []8     []12        []16     []20       (<20%)   (<40%)   (<60%)   (<80% )    (>80%)     Home Environment/Caregiver support  (Per /Navigator Interview)    Availability of basic services and/or approprate assistance during post-operative period Delay surgery and re-evaluate  Safe home environment  Health   1 week post-surgery  Transportation  availability  Ability to obtain DME/Medications post-op    [x] 0     []1     []2     []3     []4     [] 5  [x] 0     []1     []2     []3     []4     [] 5  [x] 0     []1     []2     []3     []4     [] 5  [x] 0     []1     []2     []3     []4     [] 5         MD Contact: Dr. Bob Comments:  Total Score:  4

## 2020-08-11 NOTE — DISCHARGE INSTRUCTIONS
To confirm, Your doctor has instructed you that surgery is scheduled for: 8/24/2020    Please report to [facility], Registration the morning of surgery. You must check-in and receive a wristband before going to your procedure.    Pre-Op will call the FRIDAY prior to surgery between 1:00 and 6:00 PM with the final arrival time.  Phone number: [phone]    PLEASE NOTE:  The surgery schedule has many variables which may affect the time of your surgery case.  Family members should be available if your surgery time changes.  Plan to be here the day of your procedure between 4-6 hours.    MEDICATIONS:  TAKE ONLY THESE MEDICATIONS WITH A SMALL SIP OF WATER THE MORNING OF YOUR PROCEDURE:  TIZINADINE IF NEEDED, FLOVENT, ALBUTEROL, GABAPENTIN, SERVENT    NO METFORMIN NIGHT BEFORE OR MORNING OF SURGERY      DO NOT TAKE THESE MEDICATIONS 5-7 DAYS PRIOR to your procedure or per your surgeon's request:   ASPIRIN, ALEVE, ADVIL, IBUPROFEN, FISH OIL VITAMIN E, HERBALS, CELEBREX  (May take Tylenol)    ONLY if you are prescribed any types of blood thinners such as:  Aspirin, Coumadin, Plavix, Pradaxa, Xarelto, Aggrenox, Effient, Eliquis, Savasya, Brilinta, or any other, ask your surgeon whether you should stop taking them and how long before surgery you should stop.  You may also need to verify with the prescribing physician if it is ok to stop your medication.      INSTRUCTIONS IMPORTANT!!  · Do not eat or drink anything between midnight and the time of your procedure- this includes gum, mints, and candy.  · Do not smoke or drink alcoholic beverages 24 hours prior to your procedure.  · Shower the night before AND the morning of your procedure with a Chlorhexidine wash such as Hibiclens or Dial antibacterial soap from the neck down.  Do not get it on your face or in your eyes.  You may use your own shampoo and face wash. This helps your skin to be as bacteria free as possible.    · If you wear contact lenses,  dentures, hearing aids or glasses, bring a container to put them in during surgery and give to a family member for safe keeping.  Please leave all jewelry, piercing's and valuables at home.   · DO NOT remove hair from the surgery site.  Do not shave the incision site unless you are given specific instructions to do so.    · ONLY if you have been diagnosed with sleep apnea please bring your C-PAP machine.  · ONLY if you wear home oxygen please bring your portable oxygen tank the day of your procedure.  · ONLY if you have a history of OPEN HEART SURGERY you will need a clearance from your Cardiologist per Anesthesia.      · ONLY for patients requiring bowel prep, written instructions will be given by your doctor's office.  · ONLY if you have a neuro stimulator, please bring the controller with you the morning of surgery  · ONLY if a type and screen test is needed before surgery, please return:  · If your doctor has scheduled you for an overnight stay, bring a small overnight bag with any personal items you need.  · Make arrangements in advance for transportation home by a responsible adult.  It is not safe to drive a vehicle during the 24 hours after anesthesia.     · ONLY ONE VISITOR PER PATIENT IS ALLOWED TO COME IN THE HOSPITAL THE DAY OF PROCEDURE.   · Visiting hours are 8:00AM-6:00PM. For the safety of all patients, visitors under the age of 12 are not allowed above the first floor of the hospital.    · All [facility] facilities and properties are tobacco free.  Smoking is NOT allowed.       If you have any questions about these instructions, call Pre-Op Admit  Nursing at [phone] or the Pre-Op Day Surgery Unit at [phone].

## 2020-08-11 NOTE — PRE ADMISSION SCREENING
Patient Name: Qiana Collins  YOB: 1975   MRN: [id number]     [facility]   Basic Mobility Inpatient Short Form 6 Clicks         How much difficulty does the patient currently have  Unable  A Lot  A Little  None      1. Turning over in bed (including adjusting bedclothes, sheets and blankets)?     1 []    2 []    3 []    4 [x]        2. Sitting down on and standing up from a chair with arms (e.g., wheelchair, bedside commode, etc.)     1 []  2 []  3 [x]     4 []      3. Moving from lying on back to sitting on the side of the bed?     1 []  2 []  3 []    4 [x]    How much help from another person does the patient currently need  Total  A Lot  A Little  None      4. Moving to and from a bed to a chair (including a wheelchair)?    1 []  2 []  3 []    4 [x]      5. Need to walk in hospital room?    1 []  2 []  3 []    4 [x]      6. Climbing 3-5 steps with a railing?    1 []  2 []  3 []    4 [x]        Raw Score:      23            CMS 0-100% Score:   11.20         %   Standardized Score:    56.93          CMS Modifier:       CI                                   [facility]   Basic Mobility Inpatient Short Form 6 Clicks Score Conversion Table*         *Use this form to convert -PAC Basic Mobility Inpatient Raw Scores.   [facility] Inpatient Basic Mobility Short Form Scoring Example   1. Add the number values associated with the response to each item. For example, items totals yield a Raw Score of 21.   2. Match the raw score to the t-Scale scores (t-Scale score = 50.25, SE = 4.69).   3. Find the associated CMS % (CMS % = 28.97%).   4. Locate the correct CMS Functional Modifier Code, or G Code (G code = CJ)     NOTE: Each -PAC Short Form has a separate conversion table. Make sure that you use the correct conversion table.       Instruction Manual - page 45 contains conversion table

## 2020-08-11 NOTE — PRE ADMISSION SCREENING
JOINT CAMP ASSESSMENT    Name Qiana Collins   MRN [id number]    Age/Sex 45 y.o. female    Surgeon Dr. Silver Bob   Joint Camp Date 2020   Surgery Date 2020   Procedure Right Knee Arthroplasty   Insurance Payor: MEDICAID / Plan: [facility] [employer] ([place] MEDICAID) / Product Type: Managed Medicaid /    Care Team Patient Care Team:  Darien Odonnell MD as PCP - General (Internal Medicine)  Silver Bob MD as Consulting Physician (Orthopedic Surgery)    Pharmacy   [facility] - [place], [place] -  [address]  [address]  [place] [address]  Phone: [phone] Fax: [phone]     AM-PAC Score   23   Risk Assessment Score 4     Past Medical History:   Diagnosis Date    Asthma     Bone spur of other site     Diabetes mellitus, type 2     Hyperlipemia     Hypertension     Knee pain        Past Surgical History:   Procedure Laterality Date     SECTION      x2     hysterosocpy polyp removal      KNEE ARTHROSCOPY      TUBAL LIGATION      uterin ablastion           Home Enviroment     Living Arrangement: Lives with family  Home Environment: 1-story house/ trailer, number of outside stairs: 1, tub-shower  Home Safety Concerns: Pets in the home: dogs (1).    DISCHARGE CAREGIVER/SUPPORT SYSTEM     Identified post-op caregiver: Patient has children / family / friends to help, mother and father.  Patient's caregiver(s) will be able to provide physical assistance. Patient will have someone to assist overnight.      Caregiver present at pre-op interview:  No      PRE-OPERATIVE FUNCTIONAL STATUS     Employment: Unemployed    Pre-op Functional Status: Patient is independent with mobility/ambulation, transfers, ADL's, IADL's.    Use of assistive device for ambulation: none  ADL: self care  ADL Limitations: difficulty with walking  Medical Restrictions: Decreased range of motions in extremities    POTENTIAL BARRIERS TO DISCHARGE/POTENTIAL POST-OP COMPLICATIONS     Patient  with hx of obesity, HTN, diabetes mellitus, and asthma. Patient was originally scheduled for surgery on 06/15/2020 but was cancelled due to lack of surgical clearance.    DISCHARGE PLAN     Expected LOS of 2 days or less for joint replacement discussed with patient.     Patient in agreement with discharge plan: Yes    Discharge to: Outpatient PT and Home with 24 hour assistance     HH:  None per insurance coverage.     OP PT: [facility]     Home DME: rolling walker and bedside commode    Needed DME at D/C: tub transfer bench. Patient to purchase tub transfer bench from retail prior to surgery.     Rx: Per Dr. Bob at discharge.     Meds to Beds: N/A  Patient expected to discharge on Aspirin 325mg by mouth twice daily for DVT prophylaxis.

## 2020-08-12 ENCOUNTER — TELEPHONE (OUTPATIENT)
Dept: ORTHOPEDICS | Facility: CLINIC | Age: 45
End: 2020-08-12

## 2020-08-12 DIAGNOSIS — N39.0 URINARY TRACT INFECTION WITHOUT HEMATURIA, SITE UNSPECIFIED: Primary | ICD-10-CM

## 2020-08-12 RX ORDER — SULFAMETHOXAZOLE AND TRIMETHOPRIM 800; 160 MG/1; MG/1
1 TABLET ORAL
COMMUNITY
Start: 2020-08-12 | End: 2020-08-18

## 2020-08-13 LAB
BACTERIA UR CULT: NORMAL
MRSA SPEC QL CULT: NORMAL

## 2020-08-17 DIAGNOSIS — N39.0 URINARY TRACT INFECTION WITHOUT HEMATURIA, SITE UNSPECIFIED: Primary | ICD-10-CM

## 2020-08-19 ENCOUNTER — TELEPHONE (OUTPATIENT)
Dept: ORTHOPEDICS | Facility: CLINIC | Age: 45
End: 2020-08-19

## 2020-08-21 ENCOUNTER — LAB VISIT (OUTPATIENT)
Dept: PRIMARY CARE CLINIC | Facility: CLINIC | Age: 45
End: 2020-08-21
Payer: MEDICAID

## 2020-08-21 DIAGNOSIS — Z01.818 PREOP TESTING: ICD-10-CM

## 2020-08-21 DIAGNOSIS — Z96.651 STATUS POST TOTAL KNEE REPLACEMENT, RIGHT: Primary | ICD-10-CM

## 2020-08-21 PROCEDURE — U0003 INFECTIOUS AGENT DETECTION BY NUCLEIC ACID (DNA OR RNA); SEVERE ACUTE RESPIRATORY SYNDROME CORONAVIRUS 2 (SARS-COV-2) (CORONAVIRUS DISEASE [COVID-19]), AMPLIFIED PROBE TECHNIQUE, MAKING USE OF HIGH THROUGHPUT TECHNOLOGIES AS DESCRIBED BY CMS-2020-01-R: HCPCS

## 2020-08-22 LAB — SARS-COV-2 RNA RESP QL NAA+PROBE: NOT DETECTED

## 2020-08-24 ENCOUNTER — HOSPITAL ENCOUNTER (OUTPATIENT)
Facility: HOSPITAL | Age: 45
Discharge: HOME | End: 2020-08-25
Attending: ORTHOPAEDIC SURGERY | Admitting: ORTHOPAEDIC SURGERY
Payer: MEDICAID

## 2020-08-24 DIAGNOSIS — Z01.818 PREOP TESTING: ICD-10-CM

## 2020-08-24 DIAGNOSIS — M17.11 OSTEOARTHRITIS OF RIGHT KNEE: Primary | ICD-10-CM

## 2020-08-24 DIAGNOSIS — M17.11 PRIMARY OSTEOARTHRITIS OF RIGHT KNEE: ICD-10-CM

## 2020-08-24 DIAGNOSIS — Z01.818 PREOP EXAMINATION: ICD-10-CM

## 2020-08-24 DIAGNOSIS — I10 ESSENTIAL HYPERTENSION: ICD-10-CM

## 2020-08-24 LAB
B-HCG UR QL: NEGATIVE
CTP QC/QA: YES
POCT GLUCOSE: 117 MG/DL (ref 70–110)
POCT GLUCOSE: 142 MG/DL (ref 70–110)
POCT GLUCOSE: 167 MG/DL (ref 70–110)

## 2020-08-24 PROCEDURE — 71000033 HC RECOVERY, INTIAL HOUR: Performed by: ORTHOPAEDIC SURGERY

## 2020-08-24 PROCEDURE — 25000003 PHARM REV CODE 250: Performed by: ANESTHESIOLOGY

## 2020-08-24 PROCEDURE — 25000003 PHARM REV CODE 250: Performed by: NURSE ANESTHETIST, CERTIFIED REGISTERED

## 2020-08-24 PROCEDURE — 76942 ECHO GUIDE FOR BIOPSY: CPT | Mod: 26,,, | Performed by: ANESTHESIOLOGY

## 2020-08-24 PROCEDURE — 94761 N-INVAS EAR/PLS OXIMETRY MLT: CPT | Mod: 59

## 2020-08-24 PROCEDURE — D9220A PRA ANESTHESIA: ICD-10-PCS | Mod: CRNA,,, | Performed by: NURSE ANESTHETIST, CERTIFIED REGISTERED

## 2020-08-24 PROCEDURE — 36000710: Performed by: ORTHOPAEDIC SURGERY

## 2020-08-24 PROCEDURE — 01402 ANES OPN/ARTH TOT KNE ARTHRP: CPT | Performed by: ORTHOPAEDIC SURGERY

## 2020-08-24 PROCEDURE — 37000008 HC ANESTHESIA 1ST 15 MINUTES: Performed by: ORTHOPAEDIC SURGERY

## 2020-08-24 PROCEDURE — 37000009 HC ANESTHESIA EA ADD 15 MINS: Performed by: ORTHOPAEDIC SURGERY

## 2020-08-24 PROCEDURE — 64447 NJX AA&/STRD FEMORAL NRV IMG: CPT | Performed by: ANESTHESIOLOGY

## 2020-08-24 PROCEDURE — 64447 PR NERVE BLOCK INJ, ANES/STEROID, FEMORAL, INCL IMAG GUIDANCE: ICD-10-PCS | Mod: 59,RT,, | Performed by: ANESTHESIOLOGY

## 2020-08-24 PROCEDURE — D9220A PRA ANESTHESIA: ICD-10-PCS | Mod: ANES,,, | Performed by: ANESTHESIOLOGY

## 2020-08-24 PROCEDURE — 63600175 PHARM REV CODE 636 W HCPCS: Performed by: ORTHOPAEDIC SURGERY

## 2020-08-24 PROCEDURE — 63600175 PHARM REV CODE 636 W HCPCS: Performed by: ANESTHESIOLOGY

## 2020-08-24 PROCEDURE — 81025 URINE PREGNANCY TEST: CPT | Performed by: ORTHOPAEDIC SURGERY

## 2020-08-24 PROCEDURE — 76942 PR U/S GUIDANCE FOR NEEDLE GUIDANCE: ICD-10-PCS | Mod: 26,,, | Performed by: ANESTHESIOLOGY

## 2020-08-24 PROCEDURE — 63600175 PHARM REV CODE 636 W HCPCS: Performed by: NURSE ANESTHETIST, CERTIFIED REGISTERED

## 2020-08-24 PROCEDURE — 71000039 HC RECOVERY, EACH ADD'L HOUR: Performed by: ORTHOPAEDIC SURGERY

## 2020-08-24 PROCEDURE — 97161 PT EVAL LOW COMPLEX 20 MIN: CPT

## 2020-08-24 PROCEDURE — 27201423 OPTIME MED/SURG SUP & DEVICES STERILE SUPPLY: Performed by: ORTHOPAEDIC SURGERY

## 2020-08-24 PROCEDURE — 36000711: Performed by: ORTHOPAEDIC SURGERY

## 2020-08-24 PROCEDURE — 99900035 HC TECH TIME PER 15 MIN (STAT)

## 2020-08-24 PROCEDURE — 97110 THERAPEUTIC EXERCISES: CPT

## 2020-08-24 PROCEDURE — C1713 ANCHOR/SCREW BN/BN,TIS/BN: HCPCS | Performed by: ORTHOPAEDIC SURGERY

## 2020-08-24 PROCEDURE — 99900104 DSU ONLY-NO CHARGE-EA ADD'L HR (STAT): Performed by: ORTHOPAEDIC SURGERY

## 2020-08-24 PROCEDURE — 25000003 PHARM REV CODE 250: Performed by: ORTHOPAEDIC SURGERY

## 2020-08-24 PROCEDURE — 25000003 PHARM REV CODE 250: Performed by: HOSPITALIST

## 2020-08-24 PROCEDURE — D9220A PRA ANESTHESIA: Mod: ANES,,, | Performed by: ANESTHESIOLOGY

## 2020-08-24 PROCEDURE — C1776 JOINT DEVICE (IMPLANTABLE): HCPCS | Performed by: ORTHOPAEDIC SURGERY

## 2020-08-24 PROCEDURE — 99900103 DSU ONLY-NO CHARGE-INITIAL HR (STAT): Performed by: ORTHOPAEDIC SURGERY

## 2020-08-24 PROCEDURE — 63600175 PHARM REV CODE 636 W HCPCS

## 2020-08-24 PROCEDURE — D9220A PRA ANESTHESIA: Mod: CRNA,,, | Performed by: NURSE ANESTHETIST, CERTIFIED REGISTERED

## 2020-08-24 PROCEDURE — 64447 NJX AA&/STRD FEMORAL NRV IMG: CPT | Mod: 59,RT,, | Performed by: ANESTHESIOLOGY

## 2020-08-24 DEVICE — PATELLA ONLAY 35MM TRI PEG: Type: IMPLANTABLE DEVICE | Site: KNEE | Status: FUNCTIONAL

## 2020-08-24 DEVICE — BASEPLATE TIB EVOLTN SZ 5 R: Type: IMPLANTABLE DEVICE | Site: KNEE | Status: FUNCTIONAL

## 2020-08-24 DEVICE — CEMENT BONE ORTHOSET 1 40 GRAM: Type: IMPLANTABLE DEVICE | Site: KNEE | Status: FUNCTIONAL

## 2020-08-24 DEVICE — IMPLANTABLE DEVICE: Type: IMPLANTABLE DEVICE | Site: KNEE | Status: FUNCTIONAL

## 2020-08-24 DEVICE — COMPONENT FEM SZ 5 CS/CR PRI R: Type: IMPLANTABLE DEVICE | Site: KNEE | Status: FUNCTIONAL

## 2020-08-24 RX ORDER — SODIUM CHLORIDE 0.9 % (FLUSH) 0.9 %
5 SYRINGE (ML) INJECTION
Status: DISCONTINUED | OUTPATIENT
Start: 2020-08-24 | End: 2020-08-25 | Stop reason: HOSPADM

## 2020-08-24 RX ORDER — BISACODYL 10 MG
10 SUPPOSITORY, RECTAL RECTAL DAILY
Status: DISCONTINUED | OUTPATIENT
Start: 2020-08-24 | End: 2020-08-25 | Stop reason: HOSPADM

## 2020-08-24 RX ORDER — ONDANSETRON 2 MG/ML
4 INJECTION INTRAMUSCULAR; INTRAVENOUS ONCE AS NEEDED
Status: DISCONTINUED | OUTPATIENT
Start: 2020-08-24 | End: 2020-08-24 | Stop reason: HOSPADM

## 2020-08-24 RX ORDER — CEFAZOLIN SODIUM 2 G/50ML
2 SOLUTION INTRAVENOUS
Status: COMPLETED | OUTPATIENT
Start: 2020-08-24 | End: 2020-08-24

## 2020-08-24 RX ORDER — GLUCAGON 1 MG
1 KIT INJECTION
Status: DISCONTINUED | OUTPATIENT
Start: 2020-08-24 | End: 2020-08-25 | Stop reason: HOSPADM

## 2020-08-24 RX ORDER — LIDOCAINE HYDROCHLORIDE 20 MG/ML
INJECTION INTRAVENOUS
Status: DISCONTINUED | OUTPATIENT
Start: 2020-08-24 | End: 2020-08-24

## 2020-08-24 RX ORDER — OXYCODONE HYDROCHLORIDE 5 MG/1
5 TABLET ORAL
Status: DISCONTINUED | OUTPATIENT
Start: 2020-08-24 | End: 2020-08-24 | Stop reason: HOSPADM

## 2020-08-24 RX ORDER — ZOLPIDEM TARTRATE 5 MG/1
5 TABLET ORAL NIGHTLY PRN
Status: DISCONTINUED | OUTPATIENT
Start: 2020-08-24 | End: 2020-08-25 | Stop reason: HOSPADM

## 2020-08-24 RX ORDER — POLYETHYLENE GLYCOL 3350 17 G/17G
17 POWDER, FOR SOLUTION ORAL DAILY
Status: DISCONTINUED | OUTPATIENT
Start: 2020-08-24 | End: 2020-08-25 | Stop reason: HOSPADM

## 2020-08-24 RX ORDER — OXYCODONE AND ACETAMINOPHEN 7.5; 325 MG/1; MG/1
1 TABLET ORAL EVERY 4 HOURS PRN
Qty: 28 TABLET | Refills: 0 | Status: SHIPPED | OUTPATIENT
Start: 2020-08-24 | End: 2020-08-25 | Stop reason: HOSPADM

## 2020-08-24 RX ORDER — OXYCODONE HCL 10 MG/1
10 TABLET, FILM COATED, EXTENDED RELEASE ORAL
Status: COMPLETED | OUTPATIENT
Start: 2020-08-24 | End: 2020-08-24

## 2020-08-24 RX ORDER — FENTANYL CITRATE 50 UG/ML
INJECTION, SOLUTION INTRAMUSCULAR; INTRAVENOUS
Status: DISCONTINUED | OUTPATIENT
Start: 2020-08-24 | End: 2020-08-24

## 2020-08-24 RX ORDER — CEFAZOLIN SODIUM 2 G/50ML
2 SOLUTION INTRAVENOUS
Status: COMPLETED | OUTPATIENT
Start: 2020-08-24 | End: 2020-08-25

## 2020-08-24 RX ORDER — LIDOCAINE HYDROCHLORIDE 10 MG/ML
1 INJECTION, SOLUTION EPIDURAL; INFILTRATION; INTRACAUDAL; PERINEURAL ONCE
Status: DISCONTINUED | OUTPATIENT
Start: 2020-08-24 | End: 2020-08-24

## 2020-08-24 RX ORDER — ASPIRIN 325 MG
325 TABLET ORAL 2 TIMES DAILY
Status: DISCONTINUED | OUTPATIENT
Start: 2020-08-24 | End: 2020-08-25 | Stop reason: HOSPADM

## 2020-08-24 RX ORDER — FAMOTIDINE 20 MG/1
20 TABLET, FILM COATED ORAL 2 TIMES DAILY
Status: DISCONTINUED | OUTPATIENT
Start: 2020-08-24 | End: 2020-08-25 | Stop reason: HOSPADM

## 2020-08-24 RX ORDER — SUCCINYLCHOLINE CHLORIDE 20 MG/ML
INJECTION INTRAMUSCULAR; INTRAVENOUS
Status: DISCONTINUED | OUTPATIENT
Start: 2020-08-24 | End: 2020-08-24

## 2020-08-24 RX ORDER — SODIUM CHLORIDE 9 MG/ML
INJECTION, SOLUTION INTRAVENOUS CONTINUOUS
Status: DISCONTINUED | OUTPATIENT
Start: 2020-08-24 | End: 2020-08-25 | Stop reason: HOSPADM

## 2020-08-24 RX ORDER — ONDANSETRON 2 MG/ML
INJECTION INTRAMUSCULAR; INTRAVENOUS
Status: DISCONTINUED | OUTPATIENT
Start: 2020-08-24 | End: 2020-08-24

## 2020-08-24 RX ORDER — FENTANYL CITRATE 50 UG/ML
25 INJECTION, SOLUTION INTRAMUSCULAR; INTRAVENOUS EVERY 5 MIN PRN
Status: COMPLETED | OUTPATIENT
Start: 2020-08-24 | End: 2020-08-24

## 2020-08-24 RX ORDER — HYDROMORPHONE HYDROCHLORIDE 2 MG/ML
INJECTION, SOLUTION INTRAMUSCULAR; INTRAVENOUS; SUBCUTANEOUS
Status: DISCONTINUED | OUTPATIENT
Start: 2020-08-24 | End: 2020-08-24

## 2020-08-24 RX ORDER — CELECOXIB 100 MG/1
200 CAPSULE ORAL 2 TIMES DAILY
Status: DISCONTINUED | OUTPATIENT
Start: 2020-08-24 | End: 2020-08-25 | Stop reason: HOSPADM

## 2020-08-24 RX ORDER — SODIUM CHLORIDE, SODIUM LACTATE, POTASSIUM CHLORIDE, CALCIUM CHLORIDE 600; 310; 30; 20 MG/100ML; MG/100ML; MG/100ML; MG/100ML
75 INJECTION, SOLUTION INTRAVENOUS CONTINUOUS
Status: DISCONTINUED | OUTPATIENT
Start: 2020-08-24 | End: 2020-08-25 | Stop reason: HOSPADM

## 2020-08-24 RX ORDER — SODIUM CHLORIDE 0.9 % (FLUSH) 0.9 %
3 SYRINGE (ML) INJECTION
Status: DISCONTINUED | OUTPATIENT
Start: 2020-08-24 | End: 2020-08-24 | Stop reason: HOSPADM

## 2020-08-24 RX ORDER — MORPHINE SULFATE 2 MG/ML
2 INJECTION, SOLUTION INTRAMUSCULAR; INTRAVENOUS EVERY 4 HOURS PRN
Status: DISCONTINUED | OUTPATIENT
Start: 2020-08-24 | End: 2020-08-25 | Stop reason: HOSPADM

## 2020-08-24 RX ORDER — DIPHENHYDRAMINE HYDROCHLORIDE 50 MG/ML
25 INJECTION INTRAMUSCULAR; INTRAVENOUS EVERY 6 HOURS PRN
Status: DISCONTINUED | OUTPATIENT
Start: 2020-08-24 | End: 2020-08-24 | Stop reason: HOSPADM

## 2020-08-24 RX ORDER — PROPOFOL 10 MG/ML
VIAL (ML) INTRAVENOUS CONTINUOUS PRN
Status: DISCONTINUED | OUTPATIENT
Start: 2020-08-24 | End: 2020-08-24

## 2020-08-24 RX ORDER — ONDANSETRON 8 MG/1
8 TABLET, ORALLY DISINTEGRATING ORAL EVERY 8 HOURS PRN
Status: DISCONTINUED | OUTPATIENT
Start: 2020-08-24 | End: 2020-08-25 | Stop reason: HOSPADM

## 2020-08-24 RX ORDER — MUPIROCIN 20 MG/G
OINTMENT TOPICAL
Status: DISCONTINUED | OUTPATIENT
Start: 2020-08-24 | End: 2020-08-24 | Stop reason: HOSPADM

## 2020-08-24 RX ORDER — OXYCODONE HYDROCHLORIDE 10 MG/1
10 TABLET ORAL EVERY 4 HOURS PRN
Status: DISCONTINUED | OUTPATIENT
Start: 2020-08-24 | End: 2020-08-25

## 2020-08-24 RX ORDER — ROCURONIUM BROMIDE 10 MG/ML
INJECTION, SOLUTION INTRAVENOUS
Status: DISCONTINUED | OUTPATIENT
Start: 2020-08-24 | End: 2020-08-24

## 2020-08-24 RX ORDER — INSULIN ASPART 100 [IU]/ML
0-5 INJECTION, SOLUTION INTRAVENOUS; SUBCUTANEOUS
Status: DISCONTINUED | OUTPATIENT
Start: 2020-08-24 | End: 2020-08-25 | Stop reason: HOSPADM

## 2020-08-24 RX ORDER — MIDAZOLAM HYDROCHLORIDE 1 MG/ML
INJECTION, SOLUTION INTRAMUSCULAR; INTRAVENOUS
Status: DISCONTINUED | OUTPATIENT
Start: 2020-08-24 | End: 2020-08-24

## 2020-08-24 RX ORDER — IBUPROFEN 200 MG
24 TABLET ORAL
Status: DISCONTINUED | OUTPATIENT
Start: 2020-08-24 | End: 2020-08-25 | Stop reason: HOSPADM

## 2020-08-24 RX ORDER — PROPOFOL 10 MG/ML
VIAL (ML) INTRAVENOUS
Status: DISCONTINUED | OUTPATIENT
Start: 2020-08-24 | End: 2020-08-24

## 2020-08-24 RX ORDER — SODIUM CHLORIDE, SODIUM LACTATE, POTASSIUM CHLORIDE, CALCIUM CHLORIDE 600; 310; 30; 20 MG/100ML; MG/100ML; MG/100ML; MG/100ML
INJECTION, SOLUTION INTRAVENOUS CONTINUOUS
Status: DISCONTINUED | OUTPATIENT
Start: 2020-08-24 | End: 2020-08-24

## 2020-08-24 RX ORDER — DEXAMETHASONE SODIUM PHOSPHATE 4 MG/ML
INJECTION, SOLUTION INTRA-ARTICULAR; INTRALESIONAL; INTRAMUSCULAR; INTRAVENOUS; SOFT TISSUE
Status: DISCONTINUED | OUTPATIENT
Start: 2020-08-24 | End: 2020-08-24

## 2020-08-24 RX ORDER — IBUPROFEN 200 MG
16 TABLET ORAL
Status: DISCONTINUED | OUTPATIENT
Start: 2020-08-24 | End: 2020-08-25 | Stop reason: HOSPADM

## 2020-08-24 RX ORDER — TRANEXAMIC ACID 100 MG/ML
INJECTION, SOLUTION INTRAVENOUS
Status: DISCONTINUED | OUTPATIENT
Start: 2020-08-24 | End: 2020-08-24

## 2020-08-24 RX ORDER — KETAMINE HYDROCHLORIDE 100 MG/ML
INJECTION, SOLUTION INTRAMUSCULAR; INTRAVENOUS
Status: DISCONTINUED | OUTPATIENT
Start: 2020-08-24 | End: 2020-08-24

## 2020-08-24 RX ORDER — DEXTROSE MONOHYDRATE AND SODIUM CHLORIDE 5; .45 G/100ML; G/100ML
INJECTION, SOLUTION INTRAVENOUS CONTINUOUS
Status: DISCONTINUED | OUTPATIENT
Start: 2020-08-24 | End: 2020-08-24

## 2020-08-24 RX ORDER — ACETAMINOPHEN 10 MG/ML
1000 INJECTION, SOLUTION INTRAVENOUS ONCE
Status: COMPLETED | OUTPATIENT
Start: 2020-08-24 | End: 2020-08-24

## 2020-08-24 RX ORDER — HYDROMORPHONE HYDROCHLORIDE 2 MG/ML
0.2 INJECTION, SOLUTION INTRAMUSCULAR; INTRAVENOUS; SUBCUTANEOUS EVERY 5 MIN PRN
Status: DISCONTINUED | OUTPATIENT
Start: 2020-08-24 | End: 2020-08-24 | Stop reason: HOSPADM

## 2020-08-24 RX ADMIN — HYDROMORPHONE HYDROCHLORIDE 0.4 MG: 2 INJECTION, SOLUTION INTRAMUSCULAR; INTRAVENOUS; SUBCUTANEOUS at 11:08

## 2020-08-24 RX ADMIN — TRANEXAMIC ACID 1000 MG: 100 INJECTION, SOLUTION INTRAVENOUS at 10:08

## 2020-08-24 RX ADMIN — MIDAZOLAM 1 MG: 1 INJECTION INTRAMUSCULAR; INTRAVENOUS at 11:08

## 2020-08-24 RX ADMIN — SODIUM CHLORIDE: 0.9 INJECTION, SOLUTION INTRAVENOUS at 01:08

## 2020-08-24 RX ADMIN — FAMOTIDINE 20 MG: 20 TABLET, FILM COATED ORAL at 08:08

## 2020-08-24 RX ADMIN — DEXAMETHASONE SODIUM PHOSPHATE 4 MG: 4 INJECTION, SOLUTION INTRA-ARTICULAR; INTRALESIONAL; INTRAMUSCULAR; INTRAVENOUS; SOFT TISSUE at 10:08

## 2020-08-24 RX ADMIN — HYDROMORPHONE HYDROCHLORIDE 0.2 MG: 2 INJECTION INTRAMUSCULAR; INTRAVENOUS; SUBCUTANEOUS at 01:08

## 2020-08-24 RX ADMIN — FENTANYL CITRATE 25 MCG: 50 INJECTION INTRAMUSCULAR; INTRAVENOUS at 12:08

## 2020-08-24 RX ADMIN — SODIUM CHLORIDE, SODIUM GLUCONATE, SODIUM ACETATE, POTASSIUM CHLORIDE, MAGNESIUM CHLORIDE, SODIUM PHOSPHATE, DIBASIC, AND POTASSIUM PHOSPHATE: .53; .5; .37; .037; .03; .012; .00082 INJECTION, SOLUTION INTRAVENOUS at 11:08

## 2020-08-24 RX ADMIN — ROPIVACAINE HYDROCHLORIDE: 5 INJECTION, SOLUTION EPIDURAL; INFILTRATION; PERINEURAL at 11:08

## 2020-08-24 RX ADMIN — HYDROMORPHONE HYDROCHLORIDE 0.5 MG: 2 INJECTION, SOLUTION INTRAMUSCULAR; INTRAVENOUS; SUBCUTANEOUS at 12:08

## 2020-08-24 RX ADMIN — CELECOXIB 200 MG: 100 CAPSULE ORAL at 08:08

## 2020-08-24 RX ADMIN — MIDAZOLAM 2 MG: 1 INJECTION INTRAMUSCULAR; INTRAVENOUS at 10:08

## 2020-08-24 RX ADMIN — SUCCINYLCHOLINE CHLORIDE 180 MG: 20 INJECTION, SOLUTION INTRAMUSCULAR; INTRAVENOUS; PARENTERAL at 11:08

## 2020-08-24 RX ADMIN — PROPOFOL 50 MCG/KG/MIN: 10 INJECTION, EMULSION INTRAVENOUS at 10:08

## 2020-08-24 RX ADMIN — SODIUM CHLORIDE, SODIUM GLUCONATE, SODIUM ACETATE, POTASSIUM CHLORIDE, MAGNESIUM CHLORIDE, SODIUM PHOSPHATE, DIBASIC, AND POTASSIUM PHOSPHATE: .53; .5; .37; .037; .03; .012; .00082 INJECTION, SOLUTION INTRAVENOUS at 10:08

## 2020-08-24 RX ADMIN — CEFAZOLIN SODIUM 2 G: 2 SOLUTION INTRAVENOUS at 06:08

## 2020-08-24 RX ADMIN — CEFAZOLIN SODIUM 2 G: 2 SOLUTION INTRAVENOUS at 10:08

## 2020-08-24 RX ADMIN — OXYCODONE HYDROCHLORIDE 10 MG: 10 TABLET ORAL at 08:08

## 2020-08-24 RX ADMIN — ROCURONIUM BROMIDE 10 MG: 10 INJECTION, SOLUTION INTRAVENOUS at 11:08

## 2020-08-24 RX ADMIN — PROPOFOL 100 MG: 10 INJECTION, EMULSION INTRAVENOUS at 11:08

## 2020-08-24 RX ADMIN — ONDANSETRON 4 MG: 2 INJECTION, SOLUTION INTRAMUSCULAR; INTRAVENOUS at 10:08

## 2020-08-24 RX ADMIN — LIDOCAINE HYDROCHLORIDE 75 MG: 20 INJECTION, SOLUTION INTRAVENOUS at 10:08

## 2020-08-24 RX ADMIN — FENTANYL CITRATE 100 MCG: 50 INJECTION, SOLUTION INTRAMUSCULAR; INTRAVENOUS at 10:08

## 2020-08-24 RX ADMIN — MORPHINE SULFATE 2 MG: 2 INJECTION, SOLUTION INTRAMUSCULAR; INTRAVENOUS at 11:08

## 2020-08-24 RX ADMIN — OXYCODONE HYDROCHLORIDE 10 MG: 10 TABLET ORAL at 04:08

## 2020-08-24 RX ADMIN — KETAMINE HYDROCHLORIDE 40 MG: 100 INJECTION, SOLUTION, CONCENTRATE INTRAMUSCULAR; INTRAVENOUS at 11:08

## 2020-08-24 RX ADMIN — OXYCODONE HYDROCHLORIDE 10 MG: 10 TABLET, FILM COATED, EXTENDED RELEASE ORAL at 10:08

## 2020-08-24 RX ADMIN — HYDROMORPHONE HYDROCHLORIDE 0.2 MG: 2 INJECTION INTRAMUSCULAR; INTRAVENOUS; SUBCUTANEOUS at 12:08

## 2020-08-24 RX ADMIN — MUPIROCIN: 20 OINTMENT TOPICAL at 10:08

## 2020-08-24 RX ADMIN — ASPIRIN 325 MG ORAL TABLET 325 MG: 325 PILL ORAL at 08:08

## 2020-08-24 RX ADMIN — ACETAMINOPHEN 1000 MG: 10 INJECTION, SOLUTION INTRAVENOUS at 09:08

## 2020-08-24 RX ADMIN — MORPHINE SULFATE 2 MG: 2 INJECTION, SOLUTION INTRAMUSCULAR; INTRAVENOUS at 06:08

## 2020-08-24 RX ADMIN — HYDROMORPHONE HYDROCHLORIDE 0.2 MG: 2 INJECTION, SOLUTION INTRAMUSCULAR; INTRAVENOUS; SUBCUTANEOUS at 11:08

## 2020-08-24 NOTE — H&P
CC/Indication for Procedure: 45 y.o. female with Primary osteoarthritis of right knee [M17.11]  Osteoarthritis of right knee [M17.11].    Patient scheduled for AR TOTAL KNEE ARTHROPLASTY [15167] (ARTHROPLASTY, KNEE).    Past Medical History:   Diagnosis Date    Asthma     Bone spur of other site     Diabetes mellitus, type 2     Hyperlipemia     Hypertension     Knee pain      Past Surgical History:   Procedure Laterality Date     SECTION      x2     hysterosocpy polyp removal      KNEE ARTHROSCOPY      TUBAL LIGATION      uterin ablastion       Family History   Problem Relation Age of Onset    Diabetes Mother     Dementia Mother     Diabetes Father     Heart disease Father      Social History     Socioeconomic History    Marital status: Single     Spouse name: Not on file    Number of children: Not on file    Years of education: Not on file    Highest education level: Not on file   Occupational History    Not on file   Social Needs    Financial resource strain: Not on file    Food insecurity     Worry: Not on file     Inability: Not on file    Transportation needs     Medical: Not on file     Non-medical: Not on file   Tobacco Use    Smoking status: Former Smoker     Packs/day: 0.50     Types: Cigarettes    Smokeless tobacco: Never Used   Substance and Sexual Activity    Alcohol use: No    Drug use: Yes     Types: Marijuana     Comment: socially/pain    Sexual activity: Yes     Partners: Male     Birth control/protection: None   Lifestyle    Physical activity     Days per week: Not on file     Minutes per session: Not on file    Stress: Not on file   Relationships    Social connections     Talks on phone: Not on file     Gets together: Not on file     Attends Christian service: Not on file     Active member of club or organization: Not on file     Attends meetings of clubs or organizations: Not on file     Relationship status: Not on file   Other Topics Concern    Not on file    Social History Narrative    Not on file       Review of patient's allergies indicates:  No Known Allergies      Current Facility-Administered Medications:     acetaminophen 1,000 mg/100 mL (10 mg/mL) injection 1,000 mg, 1,000 mg, Intravenous, Once, Kwan Graff MD    cefazolin (ANCEF) 2 gram in dextrose 5% 50 mL IVPB (premix), 2 g, Intravenous, On Call Procedure, Silver Bob MD    electrolyte-S (ISOLYTE), , Intravenous, Continuous, Kwan Graff MD    lactated ringers infusion, , Intravenous, Continuous, Kwan Graff MD    lidocaine (PF) 10 mg/ml (1%) injection 10 mg, 1 mL, Intradermal, Once, Kwan Graff MD    mupirocin 2 % ointment, , Nasal, On Call Procedure, Silver Bob MD    ROS:    Denies chest pain or palpitations  Denies shortness of breath  Denies fevers or chills  Denies chest pain  Denies abdominal pain    PE:    General Appearance: Well nourished  Orientation: Oriented to time, place, person  Mental Status: Alert  Heart: RRR  Lungs: CTA  Abdomen: Soft and non-tender    Anesthesia/Surgery risks, benefits and alternative options discussed and understood by patient/family.    This note was created using Dragon voice recognition software that occasionally misinterpreted phrases or words.

## 2020-08-24 NOTE — TRANSFER OF CARE
"Anesthesia Transfer of Care Note    Patient: Qiana Collins    Procedure(s) Performed: Procedure(s) (LRB):  ARTHROPLASTY, KNEE (Right)    Patient location: PACU    Anesthesia Type: general    Transport from OR: Transported from OR on 2-3 L/min O2 by NC with adequate spontaneous ventilation    Post pain: adequate analgesia    Post assessment: no apparent anesthetic complications    Post vital signs: stable    Level of consciousness: awake    Nausea/Vomiting: no nausea/vomiting    Complications: none    Transfer of care protocol was followed      Last vitals:   Visit Vitals  /65 (BP Location: Left arm, Patient Position: Lying)   Pulse 75   Temp 36.7 °C (98.1 °F) (Temporal)   Resp 19   Ht 5' 9" (1.753 m)   Wt 136.1 kg (300 lb)   SpO2 (!) 93%   Breastfeeding No   BMI 44.30 kg/m²     "

## 2020-08-24 NOTE — PROGRESS NOTES
Patient transferred via bed to room 415. Vs obtained and stable at bedside with receiving nurse.  aao x 4. Reported pain level 6 of 0-10 as tolerable.  cryocuff intact on patient's right knee.  Report given to Oscar Moyer RN.  No c/o nausea voiced.  Tolerated po fluids.

## 2020-08-24 NOTE — PLAN OF CARE
Problem: Physical Therapy Goal  Goal: Physical Therapy Goal  Description: Goals to be met by: 2020    Patient will increase functional independence with mobility by performin. Supine to sit with Modified [place]  2. Sit to supine with Modified [place]  3. Sit to stand transfer with Supervision  4. Bed to chair transfer with Supervision using Rolling Walker  5. Gait  x 250 feet with Supervision using Rolling Walker.     Outcome: Ongoing, Progressing

## 2020-08-24 NOTE — PLAN OF CARE
Plan of care reviewed patient upon arrival to unit. Patient is S/P day 0 total right knee. IV fluids infusing as ordered. SCD/foot pump in place. Neurovascular checks Q4, Pedal pulses +2. Pain monitored and treated with PRN pain medication. BG monitored. Cryotherapy to right knee. Bed in lowest , locked position, needs attended to , call light kept within reach, needs attended to . Will continue to monitor.

## 2020-08-24 NOTE — ANESTHESIA PROCEDURE NOTES
Peripheral Block    Patient location during procedure: pre-op   Block not for primary anesthetic.  Reason for block: at surgeon's request and post-op pain management   Post-op Pain Location: DDD OF RIGHT KNEE, TKA OF RIGHT KNEE  Start time: 8/24/2020 10:30 AM  Timeout: 8/24/2020 10:30 AM   End time: 8/24/2020 10:35 AM    Staffing  Authorizing Provider: Jay Jay Hook MD  Performing Provider: Jay Jay Hook MD    Preanesthetic Checklist  Completed: patient identified, site marked, surgical consent, pre-op evaluation, timeout performed, IV checked, risks and benefits discussed and monitors and equipment checked  Peripheral Block  Patient position: supine  Prep: ChloraPrep  Patient monitoring: cardiac monitor, heart rate, continuous pulse ox, continuous capnometry and frequent blood pressure checks  Block type: adductor canal  Laterality: right  Injection technique: single shot  Needle  Needle type: Stimuplex   Needle gauge: 21 G  Needle length: 4 in  Needle localization: ultrasound guidance  Test dose: lidocaine 1.5% with Epi 1-to-200,000   -ultrasound image captured on disc.  Assessment  Injection assessment: negative parasthesia, local visualized surrounding nerve and negative aspiration  Paresthesia pain: none  Heart rate change: no  Slow fractionated injection: yes  Additional Notes  EXPAREL 20 ML  MARCAINE 0.5% 10 ML

## 2020-08-24 NOTE — PT/OT/SLP EVAL
Physical Therapy Evaluation    Patient Name:  Qiana Collins   MRN:  [id number]    Recommendations:     Discharge Recommendations:  home   Discharge Equipment Recommendations: walker, rolling, bath bench, bedside commode   Barriers to discharge: None    Assessment:     Qiana Collins is a 45 y.o. female admitted with a medical diagnosis of <principal problem not specified>.  She presents with the following impairments/functional limitations:  weakness, impaired endurance, impaired functional mobilty, gait instability, impaired balance, decreased lower extremity function, decreased safety awareness, pain, decreased ROM .  Patient agreeable to PT but did report max increased pain with exercise, movement and gait.  Patient presented supine in bed and required min assist to transfer to sit and min assist to stand.  Patient then able to ambulated x 25 feet with RW with min assist but with a slow step to gait pattern.  Patient greatly limited with function by pain.    Rehab Prognosis: Good; patient would benefit from acute skilled PT services to address these deficits and reach maximum level of function.    Recent Surgery: Procedure(s) (LRB):  ARTHROPLASTY, KNEE (Right) Day of Surgery    Plan:     During this hospitalization, patient to be seen BID to address the identified rehab impairments via gait training, therapeutic activities, therapeutic exercises and progress toward the following goals:    · Plan of Care Expires:  09/28/20    Subjective     Chief Complaint: pain  Patient/Family Comments/goals: stop hurting  Pain/Comfort:  · Pain Rating 1: 6/10  · Location - Side 1: Right  · Location - Orientation 1: lower  · Location 1: knee  · Pain Addressed 1: Reposition, Cessation of Activity  · Pain Rating Post-Intervention 1: 9/10(increased pain with exercise and movement including gait)    Patients cultural, spiritual, Caodaism conflicts given the current situation:      Living Environment:  Currently lives with  family in 1 story home.  Prior to admission, patients level of function was independent.  Equipment used at home: none.  DME owned (not currently used): rolling walker, bedside commode and shower chair.  Upon discharge, patient will have assistance from family.    Objective:     Communicated with nurse prior to session.  Patient found supine with cryotherapy, SCD  upon PT entry to room.    General Precautions: Standard, fall   Orthopedic Precautions:    Braces:       Exams:  · RLE ROM: Deficits: knee extension -30 degrees, flexion 85 degrees both taken in sitting  · RLE Strength: Deficits: 2/5 overall  · LLE ROM: WFL  · LLE Strength: Deficits: 4/5 overall    Functional Mobility:  · Bed Mobility:     · Supine to Sit: minimum assistance  · Sit to Supine: moderate assistance  · Transfers:     · Sit to Stand:  minimum assistance with rolling walker  · Gait: x 25 feet with RW with CGA but with slow step to gait pattern    Therapeutic Activities and Exercises:   exercise to include quad sets, ankle pumps, hip abd and SLR. All done right LE x 8 reps as AAROM and with a 3 second hold on isometrics.    AM-PAC 6 CLICK MOBILITY  Total Score:16     Patient left supine with call button in reach, bed alarm on and nurse notified.    GOALS:   Multidisciplinary Problems     Physical Therapy Goals        Problem: Physical Therapy Goal    Goal Priority Disciplines Outcome Goal Variances Interventions   Physical Therapy Goal     PT, PT/OT Ongoing, Progressing     Description: Goals to be met by: 2020    Patient will increase functional independence with mobility by performin. Supine to sit with Modified [place]  2. Sit to supine with Modified [place]  3. Sit to stand transfer with Supervision  4. Bed to chair transfer with Supervision using Rolling Walker  5. Gait  x 250 feet with Supervision using Rolling Walker.                      History:     Past Medical History:   Diagnosis Date    Asthma     Bone spur  of other site     Diabetes mellitus, type 2     Hyperlipemia     Hypertension     Knee pain        Past Surgical History:   Procedure Laterality Date     SECTION      x2     hysterosocpy polyp removal      KNEE ARTHROSCOPY      TUBAL LIGATION      uterin ablastion         Time Tracking:     PT Received On: 20  PT Start Time: 1455     PT Stop Time: 1524  PT Total Time (min): 29 min     Billable Minutes: Evaluation 15 and Therapeutic Exercise 14      Chris Megilligan, PT  2020

## 2020-08-24 NOTE — PROGRESS NOTES
Text update sent to parents.  Spoke with 4th floor charge nurse verifying room is ready.  Bianka Fowler RN spoke with Dr. Huffman about patient.  New IVF order received.

## 2020-08-24 NOTE — PLAN OF CARE
Vs stable. aao x 4. Pain tolerable.  No c/o nausea voiced.  Tolerating po fluids.  Will transfer to IP room shortly if condition remains unchanged.

## 2020-08-24 NOTE — ANESTHESIA PREPROCEDURE EVALUATION
08/24/2020  Qiana Collins is a 45 y.o., female.    Pre-op Assessment    I have reviewed the Patient Summary Reports.     I have reviewed the Nursing Notes. I have reviewed the NPO Status.   I have reviewed the Medications.     Review of Systems  Anesthesia Hx:  No problems with previous Anesthesia    Social:  Former Smoker Smoking Status: Former Smoker  Quit Smoking:   Smokeless Tobacco Status: Never Used  Alcohol use: No  Drug use: Marijuana       Cardiovascular:   Hypertension    Pulmonary:   Asthma    Renal/:  Renal/ Normal     Hepatic/GI:  Hepatic/GI Normal    Musculoskeletal:   Arthritis     Neurological:  Neurology Normal    Endocrine:   Diabetes        Physical Exam  General:  Morbid Obesity    Airway/Jaw/Neck:  Airway Findings: Mouth Opening: Normal Tongue: Normal  General Airway Assessment: Adult  Mallampati: IV  Improves to III with phonation.  TM Distance: 4-6 cm      Dental:  Dental Findings: In tact   Chest/Lungs:  Chest/Lungs Findings: Clear to auscultation, Normal Respiratory Rate     Heart/Vascular:  Heart Findings: Rate: Normal  Rhythm: Regular Rhythm  Sounds: Normal  Heart murmur: negative       Mental Status:  Mental Status Findings:  Cooperative, Alert and Oriented         Anesthesia Plan  Type of Anesthesia, risks & benefits discussed:  Anesthesia Type:  general, regional, spinal  Patient's Preference:   Intra-op Monitoring Plan: standard ASA monitors  Intra-op Monitoring Plan Comments:   Post Op Pain Control Plan: peripheral nerve block and multimodal analgesia  Post Op Pain Control Plan Comments:   Induction:   IV and Inhalation  Beta Blocker:  Patient is not currently on a Beta-Blocker (No further documentation required).       Informed Consent: Patient understands risks and agrees with Anesthesia plan.  Questions answered. Anesthesia consent signed with patient.  ASA Score:  3     Day of Surgery Review of History & Physical: I have interviewed and examined the patient. I have reviewed the patient's H&P dated:  There are no significant changes.  H&P update referred to the surgeon.  H&P completed by Anesthesiologist.       Ready For Surgery From Anesthesia Perspective.

## 2020-08-24 NOTE — ANESTHESIA POSTPROCEDURE EVALUATION
Anesthesia Post Evaluation    Patient: Qiana Collins    Procedure(s) Performed: Procedure(s) (LRB):  ARTHROPLASTY, KNEE (Right)    Final Anesthesia Type: general    Patient location during evaluation: PACU  Patient participation: Yes- Able to Participate  Level of consciousness: awake and alert and oriented  Post-procedure vital signs: reviewed and stable  Pain management: adequate  Airway patency: patent    PONV status at discharge: No PONV  Anesthetic complications: no      Cardiovascular status: blood pressure returned to baseline  Respiratory status: unassisted, spontaneous ventilation and room air  Hydration status: euvolemic  Follow-up not needed.          Vitals Value Taken Time   /67 08/24/20 1418   Temp 35.7 °C (96.2 °F) 08/24/20 1418   Pulse 83 08/24/20 1418   Resp 18 08/24/20 1418   SpO2 94 % 08/24/20 1418         No case tracking events are documented in the log.      Pain/Brooke Score: Pain Rating Prior to Med Admin: 6 (8/24/2020  1:23 PM)  Pain Rating Post Med Admin: 6 (8/24/2020  1:59 PM)  Brooke Score: 10 (8/24/2020  1:59 PM)

## 2020-08-24 NOTE — ANESTHESIA PROCEDURE NOTES
Spinal    Diagnosis: DDD RIGHT KNEE, TKA RIGHT KNEE  Patient location during procedure: OR  Start time: 8/24/2020 10:50 AM  Timeout: 8/24/2020 10:50 AM  End time: 8/24/2020 10:55 AM    Staffing  Authorizing Provider: Jay Jay Hook MD  Performing Provider: Jay Jay Hook MD    Preanesthetic Checklist  Completed: patient identified, site marked, surgical consent, pre-op evaluation, timeout performed, IV checked, risks and benefits discussed and monitors and equipment checked  Spinal Block  Patient position: sitting  Prep: ChloraPrep  Patient monitoring: heart rate, cardiac monitor, continuous pulse ox, continuous capnometry and frequent blood pressure checks  Approach: midline  Location: L3-4  Injection technique: single shot  CSF Fluid: clear free-flowing CSF  Needle  Needle type: Qu[place]ke   Needle gauge: 25 G  Needle length: 3.5 in  Additional Documentation: incremental injection, negative aspiration for heme and no paresthesia on injection  Needle localization: anatomical landmarks  Assessment  Sensory level: T10   Dermatomal levels determined by alcohol wipe  Ease of block: easy  Patient's tolerance of the procedure: comfortable throughout block and no complaints  Additional Notes  MARCAINE 0.75% 2 ML PLAIN

## 2020-08-25 PROBLEM — E11.9 CONTROLLED TYPE 2 DIABETES MELLITUS: Status: ACTIVE | Noted: 2020-08-25

## 2020-08-25 PROBLEM — E66.01 MORBID OBESITY: Status: ACTIVE | Noted: 2020-08-25

## 2020-08-25 PROBLEM — I10 ESSENTIAL HYPERTENSION: Status: ACTIVE | Noted: 2020-08-25

## 2020-08-25 LAB
BASOPHILS # BLD AUTO: 0.01 K/UL (ref 0–0.2)
BASOPHILS NFR BLD: 0.1 % (ref 0–1.9)
DIFFERENTIAL METHOD: ABNORMAL
EOSINOPHIL # BLD AUTO: 0 K/UL (ref 0–0.5)
EOSINOPHIL NFR BLD: 0.1 % (ref 0–8)
ERYTHROCYTE [DISTWIDTH] IN BLOOD BY AUTOMATED COUNT: 12.5 % (ref 11.5–14.5)
HCT VFR BLD AUTO: 38.7 % (ref 37–48.5)
HGB BLD-MCNC: 12.2 G/DL (ref 12–16)
IMM GRANULOCYTES # BLD AUTO: 0.05 K/UL (ref 0–0.04)
IMM GRANULOCYTES NFR BLD AUTO: 0.4 % (ref 0–0.5)
LYMPHOCYTES # BLD AUTO: 2 K/UL (ref 1–4.8)
LYMPHOCYTES NFR BLD: 17.4 % (ref 18–48)
MCH RBC QN AUTO: 30.5 PG (ref 27–31)
MCHC RBC AUTO-ENTMCNC: 31.5 G/DL (ref 32–36)
MCV RBC AUTO: 97 FL (ref 82–98)
MONOCYTES # BLD AUTO: 1.3 K/UL (ref 0.3–1)
MONOCYTES NFR BLD: 11.2 % (ref 4–15)
NEUTROPHILS # BLD AUTO: 8.2 K/UL (ref 1.8–7.7)
NEUTROPHILS NFR BLD: 70.8 % (ref 38–73)
NRBC BLD-RTO: 0 /100 WBC
PLATELET # BLD AUTO: 178 K/UL (ref 150–350)
PMV BLD AUTO: 11.7 FL (ref 9.2–12.9)
POCT GLUCOSE: 116 MG/DL (ref 70–110)
POCT GLUCOSE: 138 MG/DL (ref 70–110)
RBC # BLD AUTO: 4 M/UL (ref 4–5.4)
WBC # BLD AUTO: 11.65 K/UL (ref 3.9–12.7)

## 2020-08-25 PROCEDURE — 36415 COLL VENOUS BLD VENIPUNCTURE: CPT

## 2020-08-25 PROCEDURE — 63600175 PHARM REV CODE 636 W HCPCS: Performed by: ORTHOPAEDIC SURGERY

## 2020-08-25 PROCEDURE — 25000003 PHARM REV CODE 250: Performed by: HOSPITALIST

## 2020-08-25 PROCEDURE — 97535 SELF CARE MNGMENT TRAINING: CPT

## 2020-08-25 PROCEDURE — 97530 THERAPEUTIC ACTIVITIES: CPT

## 2020-08-25 PROCEDURE — 97165 OT EVAL LOW COMPLEX 30 MIN: CPT

## 2020-08-25 PROCEDURE — 25000003 PHARM REV CODE 250: Performed by: PHYSICIAN ASSISTANT

## 2020-08-25 PROCEDURE — 63600175 PHARM REV CODE 636 W HCPCS: Performed by: PHYSICIAN ASSISTANT

## 2020-08-25 PROCEDURE — 97116 GAIT TRAINING THERAPY: CPT

## 2020-08-25 PROCEDURE — 25000003 PHARM REV CODE 250: Performed by: ORTHOPAEDIC SURGERY

## 2020-08-25 PROCEDURE — 85025 COMPLETE CBC W/AUTO DIFF WBC: CPT

## 2020-08-25 PROCEDURE — 94761 N-INVAS EAR/PLS OXIMETRY MLT: CPT

## 2020-08-25 RX ORDER — OXYCODONE HYDROCHLORIDE 10 MG/1
20 TABLET ORAL EVERY 4 HOURS PRN
Status: DISCONTINUED | OUTPATIENT
Start: 2020-08-25 | End: 2020-08-25 | Stop reason: HOSPADM

## 2020-08-25 RX ORDER — ALBUTEROL SULFATE 90 UG/1
2 AEROSOL, METERED RESPIRATORY (INHALATION) EVERY 6 HOURS PRN
Status: DISCONTINUED | OUTPATIENT
Start: 2020-08-25 | End: 2020-08-25

## 2020-08-25 RX ORDER — ALBUTEROL SULFATE 2.5 MG/.5ML
2.5 SOLUTION RESPIRATORY (INHALATION) EVERY 6 HOURS PRN
Status: DISCONTINUED | OUTPATIENT
Start: 2020-08-25 | End: 2020-08-25 | Stop reason: HOSPADM

## 2020-08-25 RX ORDER — FUROSEMIDE 40 MG/1
40 TABLET ORAL DAILY PRN
Qty: 30 TABLET | Refills: 5 | Status: SHIPPED | OUTPATIENT
Start: 2020-08-25 | End: 2022-06-20

## 2020-08-25 RX ORDER — ASPIRIN 325 MG
325 TABLET ORAL 2 TIMES DAILY
Refills: 0
Start: 2020-08-25 | End: 2020-09-29

## 2020-08-25 RX ORDER — OXYCODONE AND ACETAMINOPHEN 10; 325 MG/1; MG/1
2 TABLET ORAL EVERY 6 HOURS PRN
Qty: 56 TABLET | Refills: 0 | OUTPATIENT
Start: 2020-08-25 | End: 2020-08-25 | Stop reason: SDUPTHER

## 2020-08-25 RX ORDER — HYDROMORPHONE HYDROCHLORIDE 2 MG/1
4 TABLET ORAL ONCE
Status: DISCONTINUED | OUTPATIENT
Start: 2020-08-25 | End: 2020-08-25

## 2020-08-25 RX ORDER — HYDROMORPHONE HYDROCHLORIDE 2 MG/ML
2 INJECTION, SOLUTION INTRAMUSCULAR; INTRAVENOUS; SUBCUTANEOUS ONCE
Status: COMPLETED | OUTPATIENT
Start: 2020-08-25 | End: 2020-08-25

## 2020-08-25 RX ORDER — OXYCODONE AND ACETAMINOPHEN 10; 325 MG/1; MG/1
2 TABLET ORAL EVERY 6 HOURS PRN
Qty: 56 TABLET | Refills: 0 | Status: SHIPPED | OUTPATIENT
Start: 2020-08-25 | End: 2020-09-09 | Stop reason: SDUPTHER

## 2020-08-25 RX ORDER — LISINOPRIL 10 MG/1
10 TABLET ORAL EVERY MORNING
Status: DISCONTINUED | OUTPATIENT
Start: 2020-08-25 | End: 2020-08-25 | Stop reason: HOSPADM

## 2020-08-25 RX ORDER — GABAPENTIN 300 MG/1
600 CAPSULE ORAL 3 TIMES DAILY PRN
Status: DISCONTINUED | OUTPATIENT
Start: 2020-08-25 | End: 2020-08-25 | Stop reason: HOSPADM

## 2020-08-25 RX ADMIN — OXYCODONE HYDROCHLORIDE 20 MG: 10 TABLET ORAL at 11:08

## 2020-08-25 RX ADMIN — GABAPENTIN 600 MG: 300 CAPSULE ORAL at 09:08

## 2020-08-25 RX ADMIN — LISINOPRIL 10 MG: 10 TABLET ORAL at 09:08

## 2020-08-25 RX ADMIN — OXYCODONE HYDROCHLORIDE 20 MG: 10 TABLET ORAL at 03:08

## 2020-08-25 RX ADMIN — MORPHINE SULFATE 2 MG: 2 INJECTION, SOLUTION INTRAMUSCULAR; INTRAVENOUS at 07:08

## 2020-08-25 RX ADMIN — MORPHINE SULFATE 2 MG: 2 INJECTION, SOLUTION INTRAMUSCULAR; INTRAVENOUS at 03:08

## 2020-08-25 RX ADMIN — CELECOXIB 200 MG: 100 CAPSULE ORAL at 08:08

## 2020-08-25 RX ADMIN — OXYCODONE HYDROCHLORIDE 10 MG: 10 TABLET ORAL at 06:08

## 2020-08-25 RX ADMIN — ONDANSETRON 8 MG: 8 TABLET, ORALLY DISINTEGRATING ORAL at 08:08

## 2020-08-25 RX ADMIN — ASPIRIN 325 MG ORAL TABLET 325 MG: 325 PILL ORAL at 08:08

## 2020-08-25 RX ADMIN — FAMOTIDINE 20 MG: 20 TABLET, FILM COATED ORAL at 08:08

## 2020-08-25 RX ADMIN — HYDROMORPHONE HYDROCHLORIDE 2 MG: 2 INJECTION INTRAMUSCULAR; INTRAVENOUS; SUBCUTANEOUS at 09:08

## 2020-08-25 RX ADMIN — OXYCODONE HYDROCHLORIDE 10 MG: 10 TABLET ORAL at 02:08

## 2020-08-25 RX ADMIN — CEFAZOLIN SODIUM 2 G: 2 SOLUTION INTRAVENOUS at 02:08

## 2020-08-25 RX ADMIN — ZOLPIDEM TARTRATE 5 MG: 5 TABLET, COATED ORAL at 02:08

## 2020-08-25 RX ADMIN — SODIUM CHLORIDE: 0.9 INJECTION, SOLUTION INTRAVENOUS at 06:08

## 2020-08-25 NOTE — PLAN OF CARE
Cm completed the assessment with pt at bedside. Pt lives with her mother and independent in care.  PCP is Dr. Odonnell.  Insurance verified as Medicaid.  Pt is a diabetic.  Denies dialysis and coumadin.  Disposition:  Pt will discharge to home with family.  Pt will go to [facility] as discharge.  DME listed in graft below.  No other needs verbalized at this time.  Pt will stay with mother for 2 weeks, so her mother can assist her at discharge.       08/24/20 1530   Discharge Assessment   Assessment Type Discharge Planning Assessment   Confirmed/corrected address and phone number on facesheet? Yes   Assessment information obtained from? Patient   Expected Length of Stay (days) 2   Communicated expected length of stay with patient/caregiver yes   Prior to hospitilization cognitive status: Alert/Oriented   Prior to hospitalization functional status: Independent   Current cognitive status: Alert/Oriented   Current Functional Status: Independent;Assistive Equipment   Facility Arrived From: home   Lives With parent(s)   Able to Return to Prior Arrangements yes   Is patient able to care for self after discharge? Yes   Who are your caregiver(s) and their phone number(s)? [facility] - [phone]   Patient's perception of discharge disposition home or selfcare   Readmission Within the Last 30 Days no previous admission in last 30 days   Patient currently being followed by outpatient case management? No   Patient currently receives any other outside agency services? No   Equipment Currently Used at Home bedside commode;walker, rolling;bath bench   Do you have any problems affording any of your prescribed medications? No   Is the patient taking medications as prescribed? yes   Does the patient have transportation home? Yes   Transportation Anticipated family or friend will provide   Dialysis Name and Scheduled days na   Does the patient receive services at the Coumadin Clinic? No   Discharge Plan A Home with family    Discharge Plan B Home with family   DME Needed Upon Discharge  none   Patient/Family in Agreement with Plan yes

## 2020-08-25 NOTE — PT/OT/SLP PROGRESS
Physical Therapy Treatment    Patient Name:  Qiana Collins   MRN:  [id number]    Recommendations:     Discharge Recommendations:  home   Discharge Equipment Recommendations: walker, rolling, bath bench, bedside commode   Barriers to discharge: None    Assessment:     Qiana Collins is a 45 y.o. female admitted with a medical diagnosis of Osteoarthritis of right knee.  She presents with the following impairments/functional limitations:  weakness, impaired sensation, impaired functional mobilty, gait instability, impaired balance, decreased lower extremity function, pain, decreased ROM, edema, orthopedic precautions .  Patient reluctantly agreeable to PT treatment this morning due max C/O increased pain graded 10/10 in right knee with all activity.  Patient presented supine in bed and required min assist to transfer supine to sit and CGA to stand. Patient then was able to ambulated x 80 feet with RW with CGA but had to stop due to max right knee pain.      Rehab Prognosis: Good; patient would benefit from acute skilled PT services to address these deficits and reach maximum level of function.    Recent Surgery: Procedure(s) (LRB):  ARTHROPLASTY, KNEE (Right) 1 Day Post-Op    Plan:     During this hospitalization, patient to be seen BID to address the identified rehab impairments via gait training, therapeutic activities, therapeutic exercises and progress toward the following goals:    · Plan of Care Expires:  09/28/20    Subjective     Chief Complaint: pain  Patient/Family Comments/goals: stop hurting    Pain/Comfort:  · Pain Rating 1: 6/10  · Location - Side 1: Right  · Location - Orientation 1: lower  · Location 1: knee  · Pain Addressed 1: Reposition, Cessation of Activity  · Pain Rating Post-Intervention 1: 10/10(pain increased greatly with all attempted activity)      Objective:     Communicated with nurse prior to session.  Patient found supine with bed alarm, cryotherapy upon PT entry to room.      General Precautions: Standard, fall   Orthopedic Precautions:RLE weight bearing as tolerated   Braces:       Functional Mobility:  · Bed Mobility:     · Supine to Sit: minimum assistance  · Transfers:     · Sit to Stand:  contact guard assistance with rolling walker  · Gait: x 80 feet with RW with CGA      AM-PAC 6 CLICK MOBILITY          Therapeutic Activities and Exercises:   transfer training supine to sit with min assist, sit to stand with CGA  Gait training x 80 feet with RW with CGA  Exercise to include quad sets and LAQ. All done left LE x 10 reps. Seated hip flexion attempted but patient was crying in pain so exercise stopped at that time.     Patient left up in chair with call button in reach and nurse notified..    GOALS:   Multidisciplinary Problems     Physical Therapy Goals        Problem: Physical Therapy Goal    Goal Priority Disciplines Outcome Goal Variances Interventions   Physical Therapy Goal     PT, PT/OT Ongoing, Progressing     Description: Goals to be met by: 2020    Patient will increase functional independence with mobility by performin. Supine to sit with Modified [place]  2. Sit to supine with Modified [place]  3. Sit to stand transfer with Supervision  4. Bed to chair transfer with Supervision using Rolling Walker  5. Gait  x 250 feet with Supervision using Rolling Walker.                      Time Tracking:     PT Received On: 20  PT Start Time: 732     PT Stop Time: 0757  PT Total Time (min): 25 min     Billable Minutes: Gait Training 15 and Therapeutic Activity 10    Treatment Type: Treatment  PT/PTA: PT     PTA Visit Number: 0     Chris Megilligan, PT  2020

## 2020-08-25 NOTE — ASSESSMENT & PLAN NOTE
Patient's FSGs are controlled on current hypoglycemics.   Last A1c reviewed- No results found for: LABA1C, HGBA1C  Most recent fingerstick glucose reviewed-   Recent Labs   Lab 08/24/20  0931 08/24/20  1712 08/24/20  2034 08/25/20  0614   POCTGLUCOSE 117* 167* 142* 138*     Current correctional scale  Low  Maintain anti-hyperglycemic dose as follows-   Antihyperglycemics (From admission, onward)    Start     Stop Route Frequency Ordered    08/24/20 1542  insulin aspart U-100 pen 0-5 Units      -- SubQ Before meals & nightly PRN 08/24/20 1442        Hold Oral hypoglycemics while patient is in the hospital.

## 2020-08-25 NOTE — SUBJECTIVE & OBJECTIVE
Past Medical History:   Diagnosis Date    Asthma     Bone spur of other site     Diabetes mellitus, type 2     Hyperlipemia     Hypertension     Knee pain        Past Surgical History:   Procedure Laterality Date     SECTION      x2     hysterosocpy polyp removal      KNEE ARTHROSCOPY      TUBAL LIGATION      uterin ablastion         Review of patient's allergies indicates:  No Known Allergies    Current Facility-Administered Medications on File Prior to Encounter   Medication    [DISCONTINUED] dexamethasone injection    [DISCONTINUED] fentaNYL injection    [DISCONTINUED] hydromorphone (PF) injection    [DISCONTINUED] ketamine injection    [DISCONTINUED] lidocaine (cardiac) injection    [DISCONTINUED] midazolam injection    [DISCONTINUED] ondansetron injection    [DISCONTINUED] propofol (DIPRIVAN) 10 mg/mL infusion    [DISCONTINUED] propofol (DIPRIVAN) 10 mg/mL infusion    [DISCONTINUED] rocuronium injection    [DISCONTINUED] succinylcholine injection    [DISCONTINUED] tranexamic acid injection Soln     Current Outpatient Medications on File Prior to Encounter   Medication Sig    albuterol (PROVENTIL/VENTOLIN HFA) 90 mcg/actuation inhaler Rescue    benzoyl peroxide (BENZAC AC) 10 % external wash WASH back nightly as tolerated    blood sugar diagnostic (SKYLER BLOOD GLUCOSE TEST STRIP) Strp 1 strip by Misc.(Non-Drug; Combo Route) route once daily.    blood-glucose meter kit Use as instructed    celecoxib (CELEBREX) 200 MG capsule Take 1 capsule (200 mg total) by mouth 2 (two) times daily.    clotrimazole-betamethasone 1-0.05% (LOTRISONE) cream APPLY topically TWICE DAILY    FLOVENT  mcg/actuation inhaler Inhale 2 puffs into the lungs 2 (two) times daily.    furosemide (LASIX) 40 MG tablet Take 1 tablet (40 mg total) by mouth every morning. (Patient taking differently: Take 40 mg by mouth daily as needed. )    gabapentin (NEURONTIN) 600 MG tablet 1 po TID prn neuropathy     lancets 33 gauge Misc 1 lancet by Misc.(Non-Drug; Combo Route) route once daily.    lisinopriL 10 MG tablet Take 1 tablet (10 mg total) by mouth every morning.    nystatin (MYCOSTATIN) powder APPLY TO THE affected AREA TWICE DAILY    potassium chloride (MICRO-K) 10 MEQ CpSR Take 1 capsule (10 mEq total) by mouth once daily.    salmeterol (SEREVENT DISKUS) 50 mcg/dose diskus inhaler INHALE 1 PUFFS INTO THE LUNGS EVERY 12 HOURS    tiZANidine (ZANAFLEX) 4 MG tablet     melatonin 10 mg Tab Take 1 tablet by mouth every evening.    metFORMIN (GLUCOPHAGE) 500 MG tablet Take 1 tablet (500 mg total) by mouth 2 (two) times daily.    metroNIDAZOLE (FLAGYL) 500 MG tablet Take 1 tablet (500 mg total) by mouth every 12 (twelve) hours.    mupirocin (BACTROBAN) 2 % ointment Apply topically 2 (two) times daily.    neomycin-polymyxin-hydrocortisone (CORTISPORIN) otic solution Place 3 drops into both ears 3 (three) times daily.    triamcinolone acetonide 0.5% (KENALOG) 0.5 % Crea APPLY TO THE affected AREA of face TWICE DAILY     Family History     Problem Relation (Age of Onset)    Dementia Mother    Diabetes Mother, Father    Heart disease Father        Tobacco Use    Smoking status: Former Smoker     Packs/day: 0.50     Types: Cigarettes    Smokeless tobacco: Never Used   Substance and Sexual Activity    Alcohol use: No    Drug use: Yes     Types: Marijuana     Comment: socially/pain    Sexual activity: Yes     Partners: Male     Birth control/protection: None     Review of Systems   Constitutional: Negative for chills, fatigue and fever.   HENT: Negative for congestion and sinus pressure.    Eyes: Negative for pain and visual disturbance.   Respiratory: Negative for cough, shortness of breath and wheezing.    Cardiovascular: Negative for chest pain, palpitations and leg swelling.   Gastrointestinal: Negative for abdominal pain, diarrhea, nausea and vomiting.   Genitourinary: Negative for difficulty urinating,  hematuria, urgency and vaginal discharge.   Musculoskeletal: Negative for arthralgias, joint swelling and myalgias.   Skin: Negative for rash and wound.   Neurological: Negative for dizziness, weakness, light-headedness and headaches.   Hematological: Negative for adenopathy. Does not bruise/bleed easily.   Psychiatric/Behavioral: Negative for confusion and dysphoric mood. The patient is not nervous/anxious.      Objective:     Vital Signs (Most Recent):  Temp: 98.4 °F (36.9 °C) (08/25/20 0803)  Pulse: 71 (08/25/20 0803)  Resp: 20 (08/25/20 0803)  BP: (!) 162/86 (08/25/20 0803)  SpO2: 96 % (08/25/20 0803) Vital Signs (24h Range):  Temp:  [96.2 °F (35.7 °C)-98.4 °F (36.9 °C)] 98.4 °F (36.9 °C)  Pulse:  [71-88] 71  Resp:  [7-42] 20  SpO2:  [87 %-100 %] 96 %  BP: (107-185)/(58-90) 162/86     Weight: 136.1 kg (300 lb)  Body mass index is 44.3 kg/m².    Physical Exam  Constitutional:       General: She is in acute distress.      Appearance: She is morbidly obese. She is not diaphoretic.   HENT:      Head: Normocephalic and atraumatic.      Right Ear: External ear normal.      Left Ear: External ear normal.      Mouth/Throat:      Pharynx: No oropharyngeal exudate.   Eyes:      General: No scleral icterus.        Right eye: No discharge.         Left eye: No discharge.      Conjunctiva/sclera: Conjunctivae normal.   Neck:      Musculoskeletal: Normal range of motion and neck supple.      Thyroid: No thyromegaly.      Vascular: No JVD.   Cardiovascular:      Rate and Rhythm: Normal rate and regular rhythm.      Heart sounds: No gallop.    Pulmonary:      Effort: Pulmonary effort is normal.      Breath sounds: Normal breath sounds. No wheezing.   Abdominal:      General: Bowel sounds are normal. There is no distension.      Palpations: Abdomen is soft. There is no hepatomegaly or mass.      Tenderness: There is no abdominal tenderness.   Musculoskeletal:         General: No deformity.      Comments: Compression wrap on  right knee   Lymphadenopathy:      Cervical: No cervical adenopathy.   Skin:     General: Skin is warm and dry.      Findings: No rash.   Neurological:      Mental Status: She is alert and oriented to person, place, and time.   Psychiatric:         Behavior: Behavior normal. Behavior is cooperative.             Significant Labs: None    Significant Imaging: I have reviewed all pertinent imaging results/findings within the past 24 hours.

## 2020-08-25 NOTE — H&P
[facility] Medicine  History & Physical    Patient Name: Qiana Collins  MRN: [id number]  Admission Date: 2020  Attending Physician: Flavio Huffman MD   Primary Care Provider: Darien Odonnell MD         Patient information was obtained from patient and past medical records.     Subjective:     Principal Problem:Osteoarthritis of right knee    Chief Complaint: No chief complaint on file.       HPI: Patient is being monitored in the hospital after having undergone total knee arthroplasty.  She has hypertension and diabetes.  Has been in her usual state of health lately.  Currently she complains of uncontrolled pain.  No other unusual complaints.    Past Medical History:   Diagnosis Date    Asthma     Bone spur of other site     Diabetes mellitus, type 2     Hyperlipemia     Hypertension     Knee pain        Past Surgical History:   Procedure Laterality Date     SECTION      x2     hysterosocpy polyp removal      KNEE ARTHROSCOPY      TUBAL LIGATION      uterin ablastion         Review of patient's allergies indicates:  No Known Allergies    Current Facility-Administered Medications on File Prior to Encounter   Medication    [DISCONTINUED] dexamethasone injection    [DISCONTINUED] fentaNYL injection    [DISCONTINUED] hydromorphone (PF) injection    [DISCONTINUED] ketamine injection    [DISCONTINUED] lidocaine (cardiac) injection    [DISCONTINUED] midazolam injection    [DISCONTINUED] ondansetron injection    [DISCONTINUED] propofol (DIPRIVAN) 10 mg/mL infusion    [DISCONTINUED] propofol (DIPRIVAN) 10 mg/mL infusion    [DISCONTINUED] rocuronium injection    [DISCONTINUED] succinylcholine injection    [DISCONTINUED] tranexamic acid injection Soln     Current Outpatient Medications on File Prior to Encounter   Medication Sig    albuterol (PROVENTIL/VENTOLIN HFA) 90 mcg/actuation inhaler Rescue    benzoyl peroxide (BENZAC AC) 10 % external wash WASH back  nightly as tolerated    blood sugar diagnostic (SKYLER BLOOD GLUCOSE TEST STRIP) Strp 1 strip by Misc.(Non-Drug; Combo Route) route once daily.    blood-glucose meter kit Use as instructed    celecoxib (CELEBREX) 200 MG capsule Take 1 capsule (200 mg total) by mouth 2 (two) times daily.    clotrimazole-betamethasone 1-0.05% (LOTRISONE) cream APPLY topically TWICE DAILY    FLOVENT  mcg/actuation inhaler Inhale 2 puffs into the lungs 2 (two) times daily.    furosemide (LASIX) 40 MG tablet Take 1 tablet (40 mg total) by mouth every morning. (Patient taking differently: Take 40 mg by mouth daily as needed. )    gabapentin (NEURONTIN) 600 MG tablet 1 po TID prn neuropathy    lancets 33 gauge Misc 1 lancet by Misc.(Non-Drug; Combo Route) route once daily.    lisinopriL 10 MG tablet Take 1 tablet (10 mg total) by mouth every morning.    nystatin (MYCOSTATIN) powder APPLY TO THE affected AREA TWICE DAILY    potassium chloride (MICRO-K) 10 MEQ CpSR Take 1 capsule (10 mEq total) by mouth once daily.    salmeterol (SEREVENT DISKUS) 50 mcg/dose diskus inhaler INHALE 1 PUFFS INTO THE LUNGS EVERY 12 HOURS    tiZANidine (ZANAFLEX) 4 MG tablet     melatonin 10 mg Tab Take 1 tablet by mouth every evening.    metFORMIN (GLUCOPHAGE) 500 MG tablet Take 1 tablet (500 mg total) by mouth 2 (two) times daily.    metroNIDAZOLE (FLAGYL) 500 MG tablet Take 1 tablet (500 mg total) by mouth every 12 (twelve) hours.    mupirocin (BACTROBAN) 2 % ointment Apply topically 2 (two) times daily.    neomycin-polymyxin-hydrocortisone (CORTISPORIN) otic solution Place 3 drops into both ears 3 (three) times daily.    triamcinolone acetonide 0.5% (KENALOG) 0.5 % Crea APPLY TO THE affected AREA of face TWICE DAILY     Family History     Problem Relation (Age of Onset)    Dementia Mother    Diabetes Mother, Father    Heart disease Father        Tobacco Use    Smoking status: Former Smoker     Packs/day: 0.50     Types: Cigarettes     Smokeless tobacco: Never Used   Substance and Sexual Activity    Alcohol use: No    Drug use: Yes     Types: Marijuana     Comment: socially/pain    Sexual activity: Yes     Partners: Male     Birth control/protection: None     Review of Systems   Constitutional: Negative for chills, fatigue and fever.   HENT: Negative for congestion and sinus pressure.    Eyes: Negative for pain and visual disturbance.   Respiratory: Negative for cough, shortness of breath and wheezing.    Cardiovascular: Negative for chest pain, palpitations and leg swelling.   Gastrointestinal: Negative for abdominal pain, diarrhea, nausea and vomiting.   Genitourinary: Negative for difficulty urinating, hematuria, urgency and vaginal discharge.   Musculoskeletal: Negative for arthralgias, joint swelling and myalgias.   Skin: Negative for rash and wound.   Neurological: Negative for dizziness, weakness, light-headedness and headaches.   Hematological: Negative for adenopathy. Does not bruise/bleed easily.   Psychiatric/Behavioral: Negative for confusion and dysphoric mood. The patient is not nervous/anxious.      Objective:     Vital Signs (Most Recent):  Temp: 98.4 °F (36.9 °C) (08/25/20 0803)  Pulse: 71 (08/25/20 0803)  Resp: 20 (08/25/20 0803)  BP: (!) 162/86 (08/25/20 0803)  SpO2: 96 % (08/25/20 0803) Vital Signs (24h Range):  Temp:  [96.2 °F (35.7 °C)-98.4 °F (36.9 °C)] 98.4 °F (36.9 °C)  Pulse:  [71-88] 71  Resp:  [7-42] 20  SpO2:  [87 %-100 %] 96 %  BP: (107-185)/(58-90) 162/86     Weight: 136.1 kg (300 lb)  Body mass index is 44.3 kg/m².    Physical Exam  Constitutional:       General: She is in acute distress.      Appearance: She is morbidly obese. She is not diaphoretic.   HENT:      Head: Normocephalic and atraumatic.      Right Ear: External ear normal.      Left Ear: External ear normal.      Mouth/Throat:      Pharynx: No oropharyngeal exudate.   Eyes:      General: No scleral icterus.        Right eye: No discharge.          Left eye: No discharge.      Conjunctiva/sclera: Conjunctivae normal.   Neck:      Musculoskeletal: Normal range of motion and neck supple.      Thyroid: No thyromegaly.      Vascular: No JVD.   Cardiovascular:      Rate and Rhythm: Normal rate and regular rhythm.      Heart sounds: No gallop.    Pulmonary:      Effort: Pulmonary effort is normal.      Breath sounds: Normal breath sounds. No wheezing.   Abdominal:      General: Bowel sounds are normal. There is no distension.      Palpations: Abdomen is soft. There is no hepatomegaly or mass.      Tenderness: There is no abdominal tenderness.   Musculoskeletal:         General: No deformity.      Comments: Compression wrap on right knee   Lymphadenopathy:      Cervical: No cervical adenopathy.   Skin:     General: Skin is warm and dry.      Findings: No rash.   Neurological:      Mental Status: She is alert and oriented to person, place, and time.   Psychiatric:         Behavior: Behavior normal. Behavior is cooperative.             Significant Labs: None    Significant Imaging: I have reviewed all pertinent imaging results/findings within the past 24 hours.    Assessment/Plan:     * Osteoarthritis of right knee  Status post TKA.  Consulting physical therapy.  Orthopedic service considering an increase in analgesia.  Home with pain controlled.  DVT prevention.    Morbid obesity  Body mass index is 44.3 kg/m². Morbid obesity complicates all aspects of disease management from diagnostic modalities to treatment. Weight loss encouraged and health benefits explained to patient.      Controlled type 2 diabetes mellitus  Patient's FSGs are controlled on current hypoglycemics.   Last A1c reviewed- No results found for: LABA1C, HGBA1C  Most recent fingerstick glucose reviewed-   Recent Labs   Lab 08/24/20  0931 08/24/20  1712 08/24/20  2034 08/25/20  0614   POCTGLUCOSE 117* 167* 142* 138*     Current correctional scale  Low  Maintain anti-hyperglycemic dose as follows-    Antihyperglycemics (From admission, onward)    Start     Stop Route Frequency Ordered    08/24/20 1542  insulin aspart U-100 pen 0-5 Units      -- SubQ Before meals & nightly PRN 08/24/20 1442        Hold Oral hypoglycemics while patient is in the hospital.      Essential hypertension  Chronic, controlled.  Latest blood pressure and vitals reviewed-   Temp:  [96.2 °F (35.7 °C)-98.4 °F (36.9 °C)]   Pulse:  [71-88]   Resp:  [7-42]   BP: (107-185)/(58-90)   SpO2:  [87 %-100 %] .   Home meds for hypertension were reviewed.  I will resume them.    Hypertension Medications at home            furosemide (LASIX) 40 MG tablet Take 1 tablet (40 mg total) by mouth every morning.    lisinopriL 10 MG tablet Take 1 tablet (10 mg total) by mouth every morning.        Will utilize p.r.n. blood pressure medication only if patient's blood pressure greater than  180/110 and she develops symptoms such as worsening chest pain or shortness of breath.          VTE Risk Mitigation (From admission, onward)         Ordered     IP VTE LOW RISK PATIENT  Once      08/24/20 1413     Place sequential compression device  Until discontinued      08/24/20 1413                   Flavio Huffman MD  Department of Hospital Medicine   [facility]

## 2020-08-25 NOTE — NURSING
Dressing changed per protocol. Pt tolerated without complaint. Incision well approximated. Island dressing placed.

## 2020-08-25 NOTE — RESPIRATORY THERAPY
08/24/20 2118   Patient Assessment/Suction   Level of Consciousness (AVPU) alert   Respiratory Effort Unlabored   PRE-TX-O2   O2 Device (Oxygen Therapy) room air   SpO2 (!) 92 %   Pulse Oximetry Type Intermittent   $ Pulse Oximetry - Multiple Charge Pulse Oximetry - Multiple   Pulse 81

## 2020-08-25 NOTE — SUBJECTIVE & OBJECTIVE
"Principal Problem:Osteoarthritis of right knee    Principal Orthopedic Problem: S/P R TKA    Interval History: uncontrolled pain    Review of patient's allergies indicates:  No Known Allergies    Current Facility-Administered Medications   Medication    0.9%  NaCl infusion    albuterol sulfate nebulizer solution 2.5 mg    aspirin tablet 325 mg    bisacodyL suppository 10 mg    celecoxib capsule 200 mg    dextrose 50% injection 12.5 g    dextrose 50% injection 25 g    famotidine tablet 20 mg    gabapentin capsule 600 mg    glucagon (human recombinant) injection 1 mg    glucose chewable tablet 16 g    glucose chewable tablet 24 g    insulin aspart U-100 pen 0-5 Units    lactated ringers infusion    lisinopriL tablet 10 mg    morphine injection 2 mg    ondansetron disintegrating tablet 8 mg    oxyCODONE immediate release tablet Tab 10 mg    polyethylene glycol packet 17 g    sodium chloride 0.9% flush 5 mL    zolpidem tablet 5 mg     Objective:     Vital Signs (Most Recent):  Temp: 98.4 °F (36.9 °C) (08/25/20 0803)  Pulse: 71 (08/25/20 0803)  Resp: 20 (08/25/20 0803)  BP: (!) 162/86 (08/25/20 0803)  SpO2: 96 % (08/25/20 0803) Vital Signs (24h Range):  Temp:  [96.2 °F (35.7 °C)-98.4 °F (36.9 °C)] 98.4 °F (36.9 °C)  Pulse:  [71-88] 71  Resp:  [7-42] 20  SpO2:  [87 %-100 %] 96 %  BP: (107-185)/(58-90) 162/86     Weight: 136.1 kg (300 lb)  Height: 5' 9" (175.3 cm)  Body mass index is 44.3 kg/m².      Intake/Output Summary (Last 24 hours) at 8/25/2020 0844  Last data filed at 8/25/2020 0533  Gross per 24 hour   Intake 2250 ml   Output --   Net 2250 ml       General    Nursing note and vitals reviewed.  Constitutional: She is oriented to person, place, and time. She appears well-developed and well-nourished.   Pulmonary/Chest: Effort normal.   Neurological: She is alert and oriented to person, place, and time.   Psychiatric: She has a normal mood and affect. Her behavior is normal.           Right Knee " Exam     Comments:  RLE DNVI. Dressings C/D/I. Sitting up in BS chair      Significant Labs:   CBC:   Recent Labs   Lab 08/25/20  0536   WBC 11.65   HGB 12.2   HCT 38.7        CMP: No results for input(s): NA, K, CL, CO2, GLU, BUN, CREATININE, CALCIUM, PROT, ALBUMIN, BILITOT, ALKPHOS, AST, ALT, ANIONGAP, EGFRNONAA in the last 48 hours.    Invalid input(s): ESTGFA[place]  All pertinent labs within the past 24 hours have been reviewed.    Significant Imaging: X-Ray: I have reviewed all pertinent results/findings and my personal findings are:  Interval right TKA with the orthopedic hardware appearing in place and intact.  No complicating factors

## 2020-08-25 NOTE — PT/OT/SLP EVAL
"Occupational Therapy   Evaluation    Name: Qiana Collins  MRN: [id number]  Admitting Diagnosis:  Osteoarthritis of right knee 1 Day Post-Op    Recommendations:     Discharge Recommendations: home health PT, home health OT  Discharge Equipment Recommendations:  none  Barriers to discharge:  None    Assessment:     Qiana Collins is a 45 y.o. female with a medical diagnosis of Osteoarthritis of right knee.  Patient s/p R TKA. Patient reported less pain in R knee as compared to yesterday. Patient performed grooming tasks while standing at sink w/o assist. Patient required Max A with LB dressing and is Min A with transfers when using RW. Performance deficits affecting function: weakness, impaired endurance, impaired self care skills, impaired functional mobilty, gait instability, impaired balance, decreased lower extremity function, pain, decreased ROM, orthopedic precautions.      Rehab Prognosis: Good; patient would benefit from acute skilled OT services to address these deficits and reach maximum level of function.       Plan:     Patient to be seen 3 x/week to address the above listed problems via self-care/home management, therapeutic activities, therapeutic exercises  · Plan of Care Expires: 09/04/20  · Plan of Care Reviewed with: patient    Subjective     Chief Complaint: R knee pain  Patient/Family Comments/goals: "My pain is much better today."    Occupational Profile:  Living Environment: Patient lives with her parents and 2 teenage sons in a 1 story home.   Previous level of function: Patient was independent with ADLs and mobility.   Equipment Used at Home:  walker, rolling, bedside commode, crutches, shower chair  Assistance upon Discharge: Patient will receive assistance from family.     Pain/Comfort:  · Pain Rating 1: 4/10  · Location - Side 1: Right  · Location - Orientation 1: generalized  · Location 1: knee  · Pain Addressed 1: Reposition, Distraction  · Pain Rating Post-Intervention 1: " 5/10    Patients cultural, spiritual, Orthodox conflicts given the current situation:      Objective:     Communicated with: nurse Ghosh prior to session.  Patient found up in chair with peripheral IV upon OT entry to room.    General Precautions: Standard, fall   Orthopedic Precautions:RLE weight bearing as tolerated   Braces: N/A     Occupational Performance:    Bed Mobility:    · Not assessed. Patient seated in chair upon arrival. See PT notes.     Functional Mobility/Transfers:  · Patient completed Sit <> Stand Transfer with minimum assistance  with  rolling walker   · Patient completed Toilet Transfer Stand Pivot technique with minimum assistance with  rolling walker    Activities of Daily Living:  · Grooming: stand by assistance with oral and facial hygiene while standing at sink.  · Lower Body Dressing: maximal assistance to don/doff socks while seated in chair.  · Toileting: supervision with sg-hygiene after voiding while seated on toilet.    Cognitive/Visual Perceptual:  Cognitive/Psychosocial Skills:     -       Oriented to: x4   -       Follows Commands/attention:Follows multistep  commands  -       Communication: clear/fluent  -       Safety awareness/insight to disability: intact   -       Mood/Affect/Coping skills/emotional control: Appropriate to situation and Cooperative  Visual/Perceptual:      -Intact     Physical Exam:  Postural examination/scapula alignment:    -       Rounded shoulders  -       Forward head  Upper Extremity Range of Motion:     -       Right Upper Extremity: WFL  -       Left Upper Extremity: WFL  Upper Extremity Strength:    -       Right Upper Extremity: WFL  -       Left Upper Extremity: WFL   Strength:    -       Right Upper Extremity: WFL  -       Left Upper Extremity: WFL  Fine Motor Coordination:    -       Intact  Gross motor coordination:   WFL in  B UE    AMPAC 6 Click ADL:  AMPAC Total Score: 20    Treatment & Education:  OT ed patient on safety with walker  "use for functional mobility with cues for hand placement & sequencing.   Patient declined education with using assistive devices for LB dressing. Patient stated "My parents will help me with that."  OT ed pt on OT role & POC as well as discharge recommendations.    Education:    Patient left up in chair with call button in reach and nurse notified    GOALS:   Multidisciplinary Problems     Occupational Therapy Goals        Problem: Occupational Therapy Goal    Goal Priority Disciplines Outcome Interventions   Occupational Therapy Goal     OT, PT/OT Ongoing, Progressing    Description: Goals to be met by: 2020     Patient will increase functional independence with ADLs by performing:    LE Dressing with Supervision and Assistive Devices as needed.  Grooming while standing at sink with Supervision.  Toileting from toilet with Modified [place] for hygiene and clothing management.   Supine to sit with Supervision.  Toilet transfer to toilet with Supervision.                     History:     Past Medical History:   Diagnosis Date    Asthma     Bone spur of other site     Diabetes mellitus, type 2     Hyperlipemia     Hypertension     Knee pain        Past Surgical History:   Procedure Laterality Date     SECTION      x2     hysterosocpy polyp removal      KNEE ARTHROSCOPY      TUBAL LIGATION      uterin ablastion         Time Tracking:     OT Date of Treatment: 20  OT Start Time: 1000  OT Stop Time: 1018  OT Total Time (min): 18 min    Billable Minutes:Evaluation 8  Self Care/Home Management 10    Jon Castro, OT  2020    "

## 2020-08-25 NOTE — RESPIRATORY THERAPY
08/25/20 0735   Patient Assessment/Suction   Level of Consciousness (AVPU) alert   Respiratory Effort Normal;Unlabored   Expansion/Accessory Muscles/Retractions no use of accessory muscles;no retractions;expansion symmetric   All Lung Fields Breath Sounds clear;equal bilaterally   Rhythm/Pattern, Respiratory unlabored   Cough Frequency no cough   PRE-TX-O2   O2 Device (Oxygen Therapy) room air   SpO2 99 %   Pulse Oximetry Type Intermittent   $ Pulse Oximetry - Multiple Charge Pulse Oximetry - Multiple   Pulse 72   Resp 18

## 2020-08-25 NOTE — PLAN OF CARE
Pt is cleared from  for D/C.       08/25/20 8715   Final Note   Assessment Type Final Discharge Note   Anticipated Discharge Disposition Home   Hospital Follow Up  Appt(s) scheduled? Yes

## 2020-08-25 NOTE — PLAN OF CARE
Pt is cleared from  for D/C.       08/25/20 9190   Final Note   Assessment Type Final Discharge Note   Anticipated Discharge Disposition Home   Hospital Follow Up  Appt(s) scheduled? Yes

## 2020-08-25 NOTE — PROGRESS NOTES
"[facility]  Orthopedics  Progress Note    Patient Name: Qiana Collins  MRN: [id number]  Admission Date: 8/24/2020  Hospital Length of Stay: 0 days  Attending Provider: Flavio Huffman MD  Primary Care Provider: Darien Odonnell MD  Follow-up For: Procedure(s) (LRB):  ARTHROPLASTY, KNEE (Right)    Post-Operative Day: 1 Day Post-Op  Subjective:     Principal Problem:Osteoarthritis of right knee    Principal Orthopedic Problem: S/P R TKA    Interval History: uncontrolled pain    Review of patient's allergies indicates:  No Known Allergies    Current Facility-Administered Medications   Medication    0.9%  NaCl infusion    albuterol sulfate nebulizer solution 2.5 mg    aspirin tablet 325 mg    bisacodyL suppository 10 mg    celecoxib capsule 200 mg    dextrose 50% injection 12.5 g    dextrose 50% injection 25 g    famotidine tablet 20 mg    gabapentin capsule 600 mg    glucagon (human recombinant) injection 1 mg    glucose chewable tablet 16 g    glucose chewable tablet 24 g    insulin aspart U-100 pen 0-5 Units    lactated ringers infusion    lisinopriL tablet 10 mg    morphine injection 2 mg    ondansetron disintegrating tablet 8 mg    oxyCODONE immediate release tablet Tab 10 mg    polyethylene glycol packet 17 g    sodium chloride 0.9% flush 5 mL    zolpidem tablet 5 mg     Objective:     Vital Signs (Most Recent):  Temp: 98.4 °F (36.9 °C) (08/25/20 0803)  Pulse: 71 (08/25/20 0803)  Resp: 20 (08/25/20 0803)  BP: (!) 162/86 (08/25/20 0803)  SpO2: 96 % (08/25/20 0803) Vital Signs (24h Range):  Temp:  [96.2 °F (35.7 °C)-98.4 °F (36.9 °C)] 98.4 °F (36.9 °C)  Pulse:  [71-88] 71  Resp:  [7-42] 20  SpO2:  [87 %-100 %] 96 %  BP: (107-185)/(58-90) 162/86     Weight: 136.1 kg (300 lb)  Height: 5' 9" (175.3 cm)  Body mass index is 44.3 kg/m².      Intake/Output Summary (Last 24 hours) at 8/25/2020 0844  Last data filed at 8/25/2020 0533  Gross per 24 hour   Intake 2250 ml   Output -- "   Net 2250 ml       General    Nursing note and vitals reviewed.  Constitutional: She is oriented to person, place, and time. She appears well-developed and well-nourished.   Pulmonary/Chest: Effort normal.   Neurological: She is alert and oriented to person, place, and time.   Psychiatric: She has a normal mood and affect. Her behavior is normal.           Right Knee Exam     Comments:  RLE DNVI. Dressings C/D/I. Sitting up in BS chair      Significant Labs:   CBC:   Recent Labs   Lab 08/25/20  0536   WBC 11.65   HGB 12.2   HCT 38.7        CMP: No results for input(s): NA, K, CL, CO2, GLU, BUN, CREATININE, CALCIUM, PROT, ALBUMIN, BILITOT, ALKPHOS, AST, ALT, ANIONGAP, EGFRNONAA in the last 48 hours.    Invalid input(s): ESTGF[place]  All pertinent labs within the past 24 hours have been reviewed.    Significant Imaging: X-Ray: I have reviewed all pertinent results/findings and my personal findings are:  Interval right TKA with the orthopedic hardware appearing in place and intact.  No complicating factors    Assessment/Plan:     * Osteoarthritis of right knee  Will adjust meds for pain control  Cont OOB with PT and nursing  Change dressing today  Plan for DC home today or tomorrow depending on pain control          JODY ESTRADA  Orthopedics  [facility]

## 2020-08-25 NOTE — ASSESSMENT & PLAN NOTE
Chronic, controlled.  Latest blood pressure and vitals reviewed-   Temp:  [96.2 °F (35.7 °C)-98.4 °F (36.9 °C)]   Pulse:  [71-88]   Resp:  [7-42]   BP: (107-185)/(58-90)   SpO2:  [87 %-100 %] .   Home meds for hypertension were reviewed.  I will resume them.    Hypertension Medications at home            furosemide (LASIX) 40 MG tablet Take 1 tablet (40 mg total) by mouth every morning.    lisinopriL 10 MG tablet Take 1 tablet (10 mg total) by mouth every morning.        Will utilize p.r.n. blood pressure medication only if patient's blood pressure greater than  180/110 and she develops symptoms such as worsening chest pain or shortness of breath.

## 2020-08-25 NOTE — PLAN OF CARE
Problem: Occupational Therapy Goal  Goal: Occupational Therapy Goal  Description: Goals to be met by: 9/4/2020     Patient will increase functional independence with ADLs by performing:    LE Dressing with Supervision and Assistive Devices as needed.  Grooming while standing at sink with Supervision.  Toileting from toilet with Modified [place] for hygiene and clothing management.   Supine to sit with Supervision.  Toilet transfer to toilet with Supervision.    Outcome: Ongoing, Progressing

## 2020-08-25 NOTE — ASSESSMENT & PLAN NOTE
Body mass index is 44.3 kg/m². Morbid obesity complicates all aspects of disease management from diagnostic modalities to treatment. Weight loss encouraged and health benefits explained to patient.

## 2020-08-25 NOTE — PLAN OF CARE
Plan of care reviewed with patient, verbalizes understanding. VSS. Blood glucose monitored. IV antibiotics infused per order. Continuous IV fluids infusing per order. Pain controlled with PRN meds. SCDs maintained. Cryotherapy in place. Safety maintained, bed in lowest position, wheels locked, call light in reach.

## 2020-08-25 NOTE — ASSESSMENT & PLAN NOTE
Will adjust meds for pain control  Cont OOB with PT and nursing  Change dressing today  Plan for DC home today or tomorrow depending on pain control

## 2020-08-25 NOTE — PT/OT/SLP PROGRESS
Physical Therapy Treatment    Patient Name:  Qiana Collins   MRN:  [id number]    Recommendations:     Discharge Recommendations:  home   Discharge Equipment Recommendations: walker, rolling, bath bench, bedside commode   Barriers to discharge: None    Assessment:     Qiana Collins is a 45 y.o. female admitted with a medical diagnosis of Osteoarthritis of right knee.  She presents with the following impairments/functional limitations:  weakness, impaired endurance, impaired functional mobilty, gait instability, impaired balance, decreased lower extremity function, pain, decreased ROM, edema, orthopedic precautions .  Patient agreeable to PT treatment to include gait training and indicated she was ready to go home.  Patient presented in chair at bedside and was able to stand with SBA and then ambulated x 80 feet with RW with SBA but with an abnormal gait pattern.  Patient ambulated with increased ER right hip, decreased knee flexion right LE and no heel strike with right LE.    Rehab Prognosis: Good; patient would benefit from acute skilled PT services to address these deficits and reach maximum level of function.    Recent Surgery: Procedure(s) (LRB):  ARTHROPLASTY, KNEE (Right) 1 Day Post-Op    Plan:     During this hospitalization, patient to be seen BID to address the identified rehab impairments via gait training, therapeutic activities, therapeutic exercises and progress toward the following goals:    · Plan of Care Expires:  09/28/20    Subjective     Chief Complaint: pain  Patient/Family Comments/goals: go home  Pain/Comfort:  · Pain Rating 1: 2/10  · Location - Side 1: Right  · Location - Orientation 1: lower  · Location 1: knee  · Pain Addressed 1: Reposition, Cessation of Activity  · Pain Rating Post-Intervention 1: 7/10(pain increased with gait)      Objective:     Communicated with nurse prior to session.  Patient found up in chair with cryotherapy upon PT entry to room.     General Precautions:  Standard, fall   Orthopedic Precautions:RLE weight bearing as tolerated   Braces:       Functional Mobility:  · Transfers:     · Sit to Stand:  supervision with rolling walker  · Gait: x 80 feet with RW with supevision.      AM-PAC 6 CLICK MOBILITY          Therapeutic Activities and Exercises:   gait training x 80 feet with RW with supervision.    Patient left supine with call button in reach and nurse present..    GOALS:   Multidisciplinary Problems     Physical Therapy Goals        Problem: Physical Therapy Goal    Goal Priority Disciplines Outcome Goal Variances Interventions   Physical Therapy Goal     PT, PT/OT Ongoing, Progressing     Description: Goals to be met by: 2020    Patient will increase functional independence with mobility by performin. Supine to sit with Modified [place]  2. Sit to supine with Modified [place]  3. Sit to stand transfer with Supervision  4. Bed to chair transfer with Supervision using Rolling Walker  5. Gait  x 250 feet with Supervision using Rolling Walker.                      Time Tracking:     PT Received On: 20  PT Start Time: 1349     PT Stop Time: 1359  PT Total Time (min): 10 min     Billable Minutes: Gait Training 10    Treatment Type: Treatment  PT/PTA: PT     PTA Visit Number: 0     Chris Megilligan, PT  2020

## 2020-08-25 NOTE — ASSESSMENT & PLAN NOTE
Status post TKA.  Consulting physical therapy.  Orthopedic service considering an increase in analgesia.  Home with pain controlled.  DVT prevention.

## 2020-08-25 NOTE — NURSING
Pt. Complaints of uncontrolled pain, 10/10. Has recently had Morphine and Oxy. JODY Powell, notified at this time. Awaiting futher orders.

## 2020-08-25 NOTE — NURSING
Pt. Discharged to home. Discharge instructions given to pt. She was able to verbalize understanding. Assisted to wc to private transportation with ride. Denies needs or complaints at this time.

## 2020-08-26 ENCOUNTER — CLINICAL SUPPORT (OUTPATIENT)
Dept: REHABILITATION | Facility: HOSPITAL | Age: 45
End: 2020-08-26
Payer: MEDICAID

## 2020-08-26 ENCOUNTER — TELEPHONE (OUTPATIENT)
Dept: MEDSURG UNIT | Facility: HOSPITAL | Age: 45
End: 2020-08-26

## 2020-08-26 DIAGNOSIS — Z96.651 STATUS POST TOTAL KNEE REPLACEMENT, RIGHT: ICD-10-CM

## 2020-08-26 DIAGNOSIS — R26.9 GAIT ABNORMALITY: ICD-10-CM

## 2020-08-26 PROCEDURE — 97161 PT EVAL LOW COMPLEX 20 MIN: CPT | Mod: PN

## 2020-08-26 NOTE — HOSPITAL COURSE
Patient had an uneventful postoperative course.  She was discharged on postop day one.  Outpatient physical therapy was already set and we instructed her to attend the treatments.  Please see below for medications.

## 2020-08-26 NOTE — PLAN OF CARE
[facility]  Physical Therapy Initial Evaluation    Name: Qiana Collins  Clinic Number: [id number]    Therapy Diagnosis:   Encounter Diagnoses   Name Primary?         Gait abnormality      Physician: Silver Bob MD    Physician Orders: PT Eval and Treat   Medical Diagnosis from Referral: Z96.651 (ICD-10-CM) - Status post total knee replacement, right  Evaluation Date: 2020  Authorization Period Expiration: 2020  Plan of Care Expiration: 10/9/2020  Visit # / Visits authorized:      Time In: 1210  Time Out: 1240  Total Billable Time: 30 minutes    Precautions: asthma, DM, HTN    Subjective   Date of onset: 2020  History of current condition - Qiana presents to PT s/p R total knee arthroplasty on 2020.     Medical History:   Past Medical History:   Diagnosis Date    Asthma     Bone spur of other site     Diabetes mellitus, type 2     Hyperlipemia     Hypertension     Knee pain        Surgical History:   Qiana Collins  has a past surgical history that includes uterin ablastion; hysterosocpy polyp removal; Tubal ligation; Knee arthroscopy;  section; and Knee Arthroplasty (Right, 2020).    Past Surgical History:   Procedure Laterality Date     SECTION      x2     hysterosocpy polyp removal      KNEE ARTHROPLASTY Right 2020    Procedure: ARTHROPLASTY, KNEE;  Surgeon: Silver Bob MD;  [facility];  Service: Orthopedics;  Laterality: Right;  Everett De La Garza. converted to general/LMA.    KNEE ARTHROSCOPY      TUBAL LIGATION      uterin ablastion         Medications:   Qiana has a current medication list which includes the following prescription(s): albuterol, aspirin, benzoyl peroxide, chloe blood glucose test strip, blood-glucose meter, celecoxib, clotrimazole-betamethasone 1-0.05%, flovent hfa, furosemide, gabapentin, lancets, lisinopril, metformin, mupirocin, neomycin-polymyxin-hydrocortisone, nystatin,  "oxycodone-acetaminophen, potassium chloride, salmeterol, tizanidine, and triamcinolone acetonide 0.5%.    Allergies:   Review of patient's allergies indicates:  No Known Allergies       Prior Therapy: no  Social History/Occupation:  lives with parents and her 2 children in [facility] with [place]  Prior Level of Function: Independent  Current Level of Function: Decreased functional mobility    Pain:    Location:  R anterior knee  Description: sore, aching, and sharp shooting pain  Aggravating Factors: walking, bending the knee   Easing Factors: rest, ice, elevation  Current 5/10, worst 8/10, best 4/10     Pts goals: "Make sure that I'll be able to bend it like I'm supposed to walk properly>'    Objective     Posture/Appearance: In standing with decreased weight shift RLE; wearing flip flops   Palpation: Mild tenderness R knee      ROM/Strength:     R knee ROM 14* to 75*  MMT 3/5    Flexibility: ROM as per above  Transfers: mod I   Gait: 80 ft x 2 mod I with RW, slow paddy with decreased RLE weight shift/stance time      No FOTO due to time constraints    TREATMENT       Home Exercises and Patient Education Provided    Education provided:   - Role/goal PT; POC  - Initiate home exercise program as instructed  - Discussed monitoring symptoms and stopping activities which cause increased pain    Written Home Exercises Provided: yes.  Exercises were reviewed and Qiana was able to demonstrate them prior to the end of the session.  Qiana demonstrated good  understanding of the education provided.     See EMR under Media for exercises provided 8/26/2020.    Assessment   Qiana is a 45 y.o. female referred to outpatient Physical Therapy with a medical diagnosis of Z96.651 (ICD-10-CM) - Status post total knee replacement, right. Pt presents with decreased ROM, weakness, and gait abnormality.    Pt prognosis is Good.   Pt will benefit from skilled outpatient Physical Therapy to address the deficits stated above and in the chart " below, provide pt/family education, and to maximize pt's level of independence.     Plan of care discussed with patient: Yes  Pt's spiritual, cultural and educational needs considered and patient is agreeable to the plan of care and goals as stated below:     Anticipated Barriers for therapy: pain    Medical Necessity is demonstrated by the following  History  Co-morbidities and personal factors that may impact the plan of care Co-morbidities:   diabetes and HTN  Personal Factors:   no deficits     low   Examination  Body Structures and Functions, activity limitations and participation restrictions that may impact the plan of care Body Regions:   lower extremities  Body Systems:    ROM  strength  gait  transfers  Participation Restrictions:   Activity limitations:   Learning and applying knowledge  no deficits  General Tasks and Commands  no deficits  Communication  no deficits  Mobility  walking  moving around using equipment (WC)  Self care  no deficits  Domestic Life  doing house work (cleaning house, washing dishes, laundry)  Interactions/Relationships  no deficits  Life Areas  no deficits  Community and Social Life  recreation and leisure       moderate   Clinical Presentation stable and uncomplicated low   Decision Making/ Complexity Score: low     Goals:  Short Term Goals: 3 weeks   1) Patient will initiate HEP  2) Patient will improve knee extension to 0* for ambulatory purposes  3) Patient will improve strength 1/2 muscle grade    Long Term Goals: 6 weeks   1) Patient will be independent in HEP  2) Patient will demonstrate R knee ROM 0-110*  3) Patient return to I/ADL's with strength 4/5 and pain <4/10    Plan   Plan of care Certification: 8/26/2020 to 10/9/2020.    Outpatient Physical Therapy 2 times weekly for 6 weeks to include the following interventions: Electrical Stimulation PRN, Gait Training, Manual Therapy, Moist Heat/ Ice, Patient Education, Therapeutic Activites and Therapeutic Exercise.      Rach Churchill, PT

## 2020-08-26 NOTE — DISCHARGE SUMMARY
[facility] Hospital Medicine  Discharge Summary      Patient Name: Qiana Collins  MRN: [id number]  Admission Date: 8/24/2020  Hospital Length of Stay: 0 days  Discharge Date and Time: 8/25/2020  3:37 PM  Attending Physician: No att. providers found   Discharging Provider: Flavio Huffman MD  Primary Care Provider: Darien Odonnell MD      HPI:   Patient is being monitored in the hospital after having undergone total knee arthroplasty.  She has hypertension and diabetes.  Has been in her usual state of health lately.  Currently she complains of uncontrolled pain.  No other unusual complaints.    Procedure(s) (LRB):  ARTHROPLASTY, KNEE (Right)      Hospital Course:   Patient had an uneventful postoperative course.  She was discharged on postop day one.  Outpatient physical therapy was already set and we instructed her to attend the treatments.  Please see below for medications.     Consults:     No new Assessment & Plan notes have been filed under this hospital service since the last note was generated.  Service: Hospital Medicine    Final Active Diagnoses:    Diagnosis Date Noted POA    PRINCIPAL PROBLEM:  Osteoarthritis of right knee [M17.11] 05/08/2020 Yes    Essential hypertension [I10] 08/25/2020 Yes    Controlled type 2 diabetes mellitus [E11.9] 08/25/2020 Yes    Morbid obesity [E66.01] 08/25/2020 Yes      Problems Resolved During this Admission:       Discharged Condition: good    Disposition: Home or Self Care    Follow Up:  Follow-up Information     JODY Arroyo On 9/9/2020.    Specialty: Orthopedic Surgery  Why: post op f/u in avs  Contact information:  [address]  [address]  [place] [address]  [phone]             [facility] On 8/26/2020.    Specialty: Physical Therapy  Why: f/u in avs  Contact information:  [address]  [place] [address]  [phone]                 Patient Instructions:      COVID-19 Routine Screening   Standing Status: Future  Number of Occurrences: 1 Standing Exp. Date: 10/06/21     Order Specific Question Answer Comments   Is the patient symptomatic? No    Is this needed for pre-procedure or pre-op testing? Yes    Diagnosis: Preop testing [234144]      Diet diabetic     Activity as tolerated       Significant Diagnostic Studies:   Lab Results   Component Value Date    WBC 11.65 08/25/2020    HGB 12.2 08/25/2020    HCT 38.7 08/25/2020    MCV 97 08/25/2020     08/25/2020             Pending Diagnostic Studies:     None         Medications:  Reconciled Home Medications:      Medication List      START taking these medications    aspirin 325 MG tablet  Take 1 tablet (325 mg total) by mouth 2 (two) times daily.     oxyCODONE-acetaminophen  mg per tablet  Commonly known as: PERCOCET  Take 2 tablets by mouth every 6 (six) hours as needed for Pain.        CHANGE how you take these medications    furosemide 40 MG tablet  Commonly known as: LASIX  Take 1 tablet (40 mg total) by mouth daily as needed (swelling).  What changed:   · when to take this  · reasons to take this        CONTINUE taking these medications    albuterol 90 mcg/actuation inhaler  Commonly known as: PROVENTIL/VENTOLIN HFA  Rescue     benzoyl peroxide 10 % external wash  Commonly known as: BENZAC AC  WASH back nightly as tolerated     blood-glucose meter kit  Use as instructed     celecoxib 200 MG capsule  Commonly known as: CeleBREX  Take 1 capsule (200 mg total) by mouth 2 (two) times daily.     clotrimazole-betamethasone 1-0.05% cream  Commonly known as: LOTRISONE  APPLY topically TWICE DAILY     SKYLER BLOOD GLUCOSE TEST STRIP Strp  Generic drug: blood sugar diagnostic  1 strip by Misc.(Non-Drug; Combo Route) route once daily.     FLOVENT  mcg/actuation inhaler  Generic drug: fluticasone propionate  Inhale 2 puffs into the lungs 2 (two) times daily.     gabapentin 600 MG tablet  Commonly known as: NEURONTIN  1 po TID prn neuropathy     lancets 33 gauge  Misc  1 lancet by Misc.(Non-Drug; Combo Route) route once daily.     lisinopriL 10 MG tablet  Take 1 tablet (10 mg total) by mouth every morning.     metFORMIN 500 MG tablet  Commonly known as: GLUCOPHAGE  Take 1 tablet (500 mg total) by mouth 2 (two) times daily.     mupirocin 2 % ointment  Commonly known as: BACTROBAN  Apply topically 2 (two) times daily.     neomycin-polymyxin-hydrocortisone otic solution  Commonly known as: CORTISPORIN  Place 3 drops into both ears 3 (three) times daily.     nystatin powder  Commonly known as: MYCOSTATIN  APPLY TO THE affected AREA TWICE DAILY     potassium chloride 10 MEQ Cpsr  Commonly known as: MICRO-K  Take 1 capsule (10 mEq total) by mouth once daily.     salmeteroL 50 mcg/dose diskus inhaler  Commonly known as: SEREVENT DISKUS  INHALE 1 PUFFS INTO THE LUNGS EVERY 12 HOURS     tiZANidine 4 MG tablet  Commonly known as: ZANAFLEX     triamcinolone acetonide 0.5% 0.5 % Crea  Commonly known as: KENALOG  APPLY TO THE affected AREA of face TWICE DAILY        STOP taking these medications    melatonin 10 mg Tab     metroNIDAZOLE 500 MG tablet  Commonly known as: FLAGYL            Indwelling Lines/Drains at time of discharge:   Lines/Drains/Airways     None                 Time spent on the discharge of patient: 18 minutes  Patient was seen and examined on the date of discharge and determined to be suitable for discharge.         Flavio Huffman MD  Department of Hospital Medicine  [facility]

## 2020-08-28 VITALS
OXYGEN SATURATION: 95 % | BODY MASS INDEX: 43.4 KG/M2 | SYSTOLIC BLOOD PRESSURE: 138 MMHG | DIASTOLIC BLOOD PRESSURE: 78 MMHG | HEART RATE: 77 BPM | TEMPERATURE: 98 F | HEIGHT: 69 IN | RESPIRATION RATE: 18 BRPM | WEIGHT: 293 LBS

## 2020-08-28 PROBLEM — R26.9 GAIT ABNORMALITY: Status: ACTIVE | Noted: 2020-08-28

## 2020-08-28 NOTE — PLAN OF CARE
Pt was not given written/rpinted instructions upon discharge on how to care for incision. Pt stated leave open to air let steri strips fall off was told her upon discharge. Pt doesn't not have home health [place]. Instructed patient to message dr madison office to make sure they know she doesn't have home health and for any further instructions on incisional care.

## 2020-09-02 ENCOUNTER — TELEPHONE (OUTPATIENT)
Dept: REHABILITATION | Facility: HOSPITAL | Age: 45
End: 2020-09-02

## 2020-09-02 ENCOUNTER — CLINICAL SUPPORT (OUTPATIENT)
Dept: REHABILITATION | Facility: HOSPITAL | Age: 45
End: 2020-09-02
Payer: MEDICAID

## 2020-09-02 DIAGNOSIS — R26.9 GAIT ABNORMALITY: ICD-10-CM

## 2020-09-02 PROCEDURE — 97110 THERAPEUTIC EXERCISES: CPT | Mod: PN,CQ

## 2020-09-02 NOTE — PROGRESS NOTES
Physical Therapy Treatment Note     Name: Qiana Chou [facility]  Clinic Number: [id number]    Therapy Diagnosis:   Encounter Diagnosis   Name Primary?    Gait abnormality      Physician: Silver Bob MD    Visit Date: 9/2/2020     Physician Orders: PT Eval and Treat   Medical Diagnosis from Referral: Z96.651 (ICD-10-CM) - Status post total knee replacement, right  Evaluation Date: 8/26/2020  Authorization Period Expiration: 12/31/2020  Plan of Care Expiration: 10/9/2020  Visit # / Visits authorized: 2/ 12     Time In: 1245  Time Out: 1315  Total Billable Time: 30 minutes     Precautions: asthma, DM, HTN    Subjective     Pt reports: Late for appt 2* father is driving her to appt here after dropping mother at appt on the other end of [address] at close times. .  She was compliant with home exercise program from acute PT.  Response to previous treatment: no soreness  Functional change: n/a (first visit after eval)    Pain: 0/10  Location: right knee      Objective     Adaptive devices/equipment/orthotics used today: anitha    Qiana received therapeutic exercises to develop strength, endurance, ROM, flexibility, posture and core stabilization for 25 minutes including:    Supine TE 10/2:     QS     SLR R with A     HS R with slide board     Hip abd R with slide board     SAQ B  Seated hamstring stretches 3/30s L/R    Gait training x 5 minutes with rw and SBA with vc for improved poature, increased R LE clearance and TKE with heelstrike and stance.     Home Exercises Provided and Patient Education Provided     Education provided:   - Educated pt that he/she may feel soreness after session.      Written Home Exercises Provided: yes.  Exercises were reviewed and Qiana was able to demonstrate them prior to the end of the session.  Qiana demonstrated good  understanding of the education provided.     See EMR under Patient Instructions for exercises provided 9/2/2020.    Assessment     Improved AROM after a few reps of  FRANK SLR. Occasionally whincing in pain but good tolerance and motivated.   Qiana is progressing well towards her goals.   Pt prognosis is Good.     Pt will continue to benefit from skilled outpatient physical therapy to address the deficits listed in the problem list box on initial evaluation, provide pt/family education and to maximize pt's level of independence in the home and community environment.     Pt's spiritual, cultural and educational needs considered and pt agreeable to plan of care and goals.     Anticipated barriers to physical therapy: pain    Goals:   Short Term Goals: 3 weeks   1) Patient will initiate HEP (initiated)  2) Patient will improve knee extension to 0* for ambulatory purposes (Progressing)  3) Patient will improve strength 1/2 muscle grade (Progressing)     Long Term Goals: 6 weeks   1) Patient will be independent in HEP  2) Patient will demonstrate R knee ROM 0-110*  3) Patient return to I/ADL's with strength 4/5 and pain <4/10    Plan     Continue per POC, progressing as appropriate.      Collette Tellez, PTA

## 2020-09-02 NOTE — PATIENT INSTRUCTIONS
Strengthening: Quadriceps Set        Tighten muscles on top of thighs by pushing knees down into surface. Hold _5__ seconds.  Repeat __10__ times per set. Do __2__ sets per session. Do _1-2___ sessions per day.     [email or web address]     Copyright © [employer]. All rights reserved.   Strengthening: Straight Leg Raise (Phase 1)        Tighten muscles on front of right thigh, then lift leg _18___ inches from surface, keeping knee locked.   Repeat __10__ times per set. Do __2__ sets per session. Do _1-2___ sessions per day.     [email or web address]     Copyright © [employer]. All rights reserved.   Strengthening: Terminal Knee Extension (Supine)        With right knee over bolster, straighten knee by tightening muscles on top of thigh. Keep bottom of knee on bolster.  Repeat _10___ times per set. Do __2__ sets per session. Do _1-2__ sessions per day.     [email or web address]     Copyright © [employer]. All rights reserved.   Self-Mobilization: Heel Slide (Supine)        Slide left heel toward buttocks until a gentle stretch is felt. Hold ___5_ seconds. Relax.  Repeat _10___ times per set. Do __2__ sets per session. Do _1-2___ sessions per day.     [email or web address]     Copyright © [employer]. All rights reserved.   Hip Abduction / Adduction: with Extended Knee (Supine)        Bring left leg out to side and return. Keep knee straight.  Repeat _10__ times per set. Do __2__ sets per session. Do _1-2___ sessions per day.     [email or web address]     Copyright © [employer]. All rights reserved.

## 2020-09-03 ENCOUNTER — CLINICAL SUPPORT (OUTPATIENT)
Dept: REHABILITATION | Facility: HOSPITAL | Age: 45
End: 2020-09-03
Payer: MEDICAID

## 2020-09-03 DIAGNOSIS — R26.9 GAIT ABNORMALITY: ICD-10-CM

## 2020-09-03 PROCEDURE — 97110 THERAPEUTIC EXERCISES: CPT | Mod: PN,CQ

## 2020-09-03 NOTE — PROGRESS NOTES
"  Physical Therapy Treatment Note     Name: Qiana Collins  Clinic Number: [id number]    Therapy Diagnosis:   Encounter Diagnosis   Name Primary?    Gait abnormality      Physician: Silver Bob MD    Visit Date: 9/3/2020     Physician Orders: PT Eval and Treat   Medical Diagnosis from Referral: Z96.651 (ICD-10-CM) - Status post total knee replacement, right  Evaluation Date: 8/26/2020  Authorization Period Expiration: 12/31/2020  Plan of Care Expiration: 10/9/2020  Visit # / Visits authorized: 3/ 12     Time In: 1100  Time Out: 1153  Total Billable Time: 53 minutes     Precautions: asthma, DM, HTN    Subjective     Pt reports: brought to clinic per father. Feeling "ok" today. R LE feels so heavy and awkward. It don't want to work." Went grocery shopping yesterday after session. Rode in motor[employer]igix scooter, "but I feel like that probably caused the swelling".   She was compliant with home exercise program from acute PT.  Response to previous treatment: no soreness  Functional change: n/a    Pain: 0/10  Location: right knee      Objective     Adaptive devices/equipment/orthotics used today: anitha Kiran received therapeutic exercises to develop strength, endurance, ROM, flexibility, posture and core stabilization for 48 minutes including:    Supine TE 10/2:     QS (10/3)     SLR R      HS R with slide board     Hip abd R with slide board     SAQ B  Seated hamstring stretches 3/30s L/R    Bike 1/2 revolutions 10' level 1  Standing HR/TR B 10  Minisquats 10  Standing R hip abd 10/2   Standing gastroc stretches 3/30s R over 1/2 foam roll in // bars    Gait training x 5 minutes with rw and SBA with vc for improved poature, increased R LE clearance and TKE with heelstrike and stance.     Home Exercises Provided and Patient Education Provided     Education provided:   - Educated pt that he/she may feel soreness after session.      Written Home Exercises Provided: yes.  Exercises were reviewed and Qiana was able to " demonstrate them prior to the end of the session.  Qiana demonstrated good  understanding of the education provided.     See EMR under Patient Instructions for exercises provided 9/2/2020.    Assessment     Occasional whincing during ex's and requires vc for proper breathing techniques to prevent holding her breath. NOTE that pt admitted to dragging rw over rug and catching it while walking out of building but states she did not fall. Rug was folded over on one end. Pt had walked outside to wait for her ride.) Reviewed importance of lifting rw over obstacles to prevent falls. Pt agreed to observe safety over rugs.   Qiana is progressing well towards her goals.   Pt prognosis is Good.     Pt will continue to benefit from skilled outpatient physical therapy to address the deficits listed in the problem list box on initial evaluation, provide pt/family education and to maximize pt's level of independence in the home and community environment.     Pt's spiritual, cultural and educational needs considered and pt agreeable to plan of care and goals.     Anticipated barriers to physical therapy: pain    Goals:   Short Term Goals: 3 weeks   1) Patient will initiate HEP (ongoing)  2) Patient will improve knee extension to 0* for ambulatory purposes (Progressing)  3) Patient will improve strength 1/2 muscle grade (Progressing)     Long Term Goals: 6 weeks   1) Patient will be independent in HEP  2) Patient will demonstrate R knee ROM 0-110*  3) Patient return to I/ADL's with strength 4/5 and pain <4/10    Plan     Continue per POC, progressing as appropriate.      Collette Tellez, PTA

## 2020-09-04 ENCOUNTER — TELEPHONE (OUTPATIENT)
Dept: ORTHOPEDICS | Facility: CLINIC | Age: 45
End: 2020-09-04

## 2020-09-08 ENCOUNTER — CLINICAL SUPPORT (OUTPATIENT)
Dept: REHABILITATION | Facility: HOSPITAL | Age: 45
End: 2020-09-08
Payer: MEDICAID

## 2020-09-08 DIAGNOSIS — R26.9 GAIT ABNORMALITY: ICD-10-CM

## 2020-09-08 PROCEDURE — 97110 THERAPEUTIC EXERCISES: CPT | Mod: PN,CQ

## 2020-09-08 NOTE — PROGRESS NOTES
Physical Therapy Treatment Note     Name: Qiana Collins  Clinic Number: [id number]    Therapy Diagnosis:   Encounter Diagnosis   Name Primary?    Gait abnormality      Physician: Silver Bob MD    Visit Date: 9/8/2020     Physician Orders: PT Eval and Treat   Medical Diagnosis from Referral: Z96.651 (ICD-10-CM) - Status post total knee replacement, right  Evaluation Date: 8/26/2020  Authorization Period Expiration: 12/31/2020  Plan of Care Expiration: 10/9/2020  Visit # / Visits authorized: 4/ 12     Time In: 1203  Time Out: 1245  Total Billable Time: 42 minutes     Precautions: asthma, DM, HTN    Subjective     Pt reports: brought to clinic per father.No pain today.  Aching at night an trying to sleep with knee extended.   She was compliant with home exercise program from acute PT.  Response to previous treatment: no soreness  Functional change: none stated    Pain: 0/10  Location: right knee      Objective     Adaptive devices/equipment/orthotics used today: anitha Kiran received therapeutic exercises to develop strength, endurance, ROM, flexibility, posture and core stabilization for 42 minutes including:    Bike 1/2 revolutions 10' level 1  Supine TE 10/2:     QS      SLR R      HS R with slide board     Hip abd R with slide board     SAQ B  Seated hamstring stretches 3/30s L/R    Standing HR/TR B 10/2 airex  Minisquats 10/2 airex  Standing gastroc stretches 3/30s R over 1/2 foam roll in // bars    Home Exercises Provided and Patient Education Provided     Education provided:   - Educated pt that he/she may feel soreness after session.      Written Home Exercises Provided: Patient instructed to cont prior HEP.  Exercises were reviewed and Qiana was able to demonstrate them prior to the end of the session.  Qiana demonstrated good  understanding of the education provided.     See EMR under Patient Instructions for exercises provided 9/2/2020.    Assessment     Moaning sometimes with ex's but states  it is not painful. Accidentally did a full rotation of retropropulsion on bike without discomfort. Feels tightness on her knee with flexion end ranges. Reminded pt of importance of NOT pushing her knee flexion, only working in comfortable ranges.      Qiana is progressing well towards her goals.   Pt prognosis is Good.     Pt will continue to benefit from skilled outpatient physical therapy to address the deficits listed in the problem list box on initial evaluation, provide pt/family education and to maximize pt's level of independence in the home and community environment.     Pt's spiritual, cultural and educational needs considered and pt agreeable to plan of care and goals.     Anticipated barriers to physical therapy: pain    Goals:   Short Term Goals: 3 weeks   1) Patient will initiate HEP (ongoing)  2) Patient will improve knee extension to 0* for ambulatory purposes (Progressing)  3) Patient will improve strength 1/2 muscle grade (Progressing)     Long Term Goals: 6 weeks   1) Patient will be independent in HEP  2) Patient will demonstrate R knee ROM 0-110*  3) Patient return to I/ADL's with strength 4/5 and pain <4/10    Plan     Continue per POC, progressing as appropriate.      Collette Tellez, PTA

## 2020-09-09 ENCOUNTER — OFFICE VISIT (OUTPATIENT)
Dept: ORTHOPEDICS | Facility: CLINIC | Age: 45
End: 2020-09-09
Payer: MEDICAID

## 2020-09-09 VITALS
WEIGHT: 293 LBS | HEIGHT: 69 IN | DIASTOLIC BLOOD PRESSURE: 62 MMHG | HEART RATE: 75 BPM | SYSTOLIC BLOOD PRESSURE: 138 MMHG | BODY MASS INDEX: 43.4 KG/M2

## 2020-09-09 DIAGNOSIS — Z96.651 STATUS POST TOTAL KNEE REPLACEMENT, RIGHT: Primary | ICD-10-CM

## 2020-09-09 RX ORDER — OXYCODONE AND ACETAMINOPHEN 10; 325 MG/1; MG/1
2 TABLET ORAL EVERY 6 HOURS PRN
Qty: 28 TABLET | Refills: 0 | Status: SHIPPED | OUTPATIENT
Start: 2020-09-09 | End: 2020-09-16

## 2020-09-09 NOTE — PROGRESS NOTES
Pt came in today. Sutures removed from right knee. No redness or discharge noted. Pt instructed to make return appointment in 3 weeks.

## 2020-09-14 ENCOUNTER — PATIENT OUTREACH (OUTPATIENT)
Dept: ADMINISTRATIVE | Facility: HOSPITAL | Age: 45
End: 2020-09-14

## 2020-09-14 ENCOUNTER — TELEPHONE (OUTPATIENT)
Dept: REHABILITATION | Facility: HOSPITAL | Age: 45
End: 2020-09-14

## 2020-09-14 DIAGNOSIS — Z12.31 SCREENING MAMMOGRAM, ENCOUNTER FOR: Primary | ICD-10-CM

## 2020-09-14 NOTE — PROGRESS NOTES
Health Maintenance Due   Topic Date Due    Hepatitis C Screening  1975    Hemoglobin A1c  1977    Eye Exam  05/04/1987    HIV Screening  05/04/1992    Pap Smear with HPV Cotest  05/04/1998    Low Dose Statin  05/04/1998    Mammogram  05/04/2017    Lipid Panel  05/29/2019    Influenza Vaccine (1) 08/01/2020    Foot Exam  08/28/2020

## 2020-09-23 ENCOUNTER — PATIENT OUTREACH (OUTPATIENT)
Dept: ADMINISTRATIVE | Facility: HOSPITAL | Age: 45
End: 2020-09-23

## 2020-09-23 NOTE — PROGRESS NOTES
Chart Auditing Tool   Topic Results    Reconcile Immunizations Completed    Review Health Maintenance Topics [facility]    Care Team Updated    Appointments N/A    Care Coordination Note Completed    Outreach Portal    Care Everywhere Completed    Completion of Review Done     Chart Audit Complete: Notes:  Smitha Armendariz LPN  Lead Clinical Care Coordinator   [facility]

## 2020-09-25 ENCOUNTER — TELEPHONE (OUTPATIENT)
Dept: PRIMARY CARE CLINIC | Facility: CLINIC | Age: 45
End: 2020-09-25

## 2020-09-25 NOTE — TELEPHONE ENCOUNTER
----- Message from Coty Brar sent at 9/25/2020 10:38 AM CDT -----  Contact: [facility] @ [phone]  She want to know if they can recommend a cholesterol medication due to her hx of diabetes.

## 2020-09-26 ENCOUNTER — PATIENT MESSAGE (OUTPATIENT)
Dept: PRIMARY CARE CLINIC | Facility: CLINIC | Age: 45
End: 2020-09-26

## 2020-09-29 ENCOUNTER — CLINICAL SUPPORT (OUTPATIENT)
Dept: REHABILITATION | Facility: HOSPITAL | Age: 45
End: 2020-09-29
Payer: MEDICAID

## 2020-09-29 ENCOUNTER — PATIENT MESSAGE (OUTPATIENT)
Dept: ORTHOPEDICS | Facility: CLINIC | Age: 45
End: 2020-09-29

## 2020-09-29 ENCOUNTER — OFFICE VISIT (OUTPATIENT)
Dept: ORTHOPEDICS | Facility: CLINIC | Age: 45
End: 2020-09-29
Payer: MEDICAID

## 2020-09-29 ENCOUNTER — TELEPHONE (OUTPATIENT)
Dept: ORTHOPEDICS | Facility: CLINIC | Age: 45
End: 2020-09-29

## 2020-09-29 VITALS — DIASTOLIC BLOOD PRESSURE: 76 MMHG | HEART RATE: 86 BPM | SYSTOLIC BLOOD PRESSURE: 128 MMHG

## 2020-09-29 DIAGNOSIS — R26.9 GAIT ABNORMALITY: ICD-10-CM

## 2020-09-29 DIAGNOSIS — M17.12 PRIMARY OSTEOARTHRITIS OF LEFT KNEE: ICD-10-CM

## 2020-09-29 DIAGNOSIS — Z96.651 STATUS POST TOTAL KNEE REPLACEMENT, RIGHT: Primary | ICD-10-CM

## 2020-09-29 PROCEDURE — 97140 MANUAL THERAPY 1/> REGIONS: CPT | Mod: PN,CQ

## 2020-09-29 PROCEDURE — 99024 PR POST-OP FOLLOW-UP VISIT: ICD-10-PCS | Mod: S$GLB,,, | Performed by: ORTHOPAEDIC SURGERY

## 2020-09-29 PROCEDURE — 97110 THERAPEUTIC EXERCISES: CPT | Mod: PN,CQ

## 2020-09-29 PROCEDURE — 99024 POSTOP FOLLOW-UP VISIT: CPT | Mod: S$GLB,,, | Performed by: ORTHOPAEDIC SURGERY

## 2020-09-29 RX ORDER — HYDROCODONE BITARTRATE AND ACETAMINOPHEN 7.5; 325 MG/1; MG/1
1 TABLET ORAL EVERY 6 HOURS PRN
Qty: 28 TABLET | Refills: 0 | Status: SHIPPED | OUTPATIENT
Start: 2020-09-29 | End: 2020-10-06

## 2020-09-29 NOTE — PROGRESS NOTES
"  Physical Therapy Treatment Note     [facility]  Clinic Number: [id number]    Therapy Diagnosis:   Encounter Diagnosis   Name Primary?    Gait abnormality      Physician: Silver Bob MD    Visit Date: 9/29/2020    Physician Orders: PT Eval and Treat   Medical Diagnosis from Referral: Z96.651 (ICD-10-CM) - Status post total knee replacement, right  Evaluation Date: 8/26/2020  Authorization Period Expiration: 12/31/2020  Plan of Care Certification Period: 8/26/2020 to 10/9/2020  Visit # / Visits authorized:  5/ 12     PTA visit # 4    Time In: 1145  Time Out:1230  Total Billable Time: 45 minutes     Precautions: asthma, DM, HTN    Subjective     Pt reports: no pain today.  "It's getting better every day"  She was compliant with home exercise program from acute PT.  Response to previous treatment: no complaints  Functional change: n/a     Pain: 0/10  Location: right knee      Objective      Qiana received therapeutic exercises to develop strength, endurance, ROM, flexibility, posture and core stabilization for  minutes including:    Bike  X 10' L-1 (full revolutions)  QS x 20 reps  SLR R x 20 reps   Heel slides x 20 reps with sheet  SAQ x 20 reps (pain at end range proximal to patella)  HSS 3 x 30 sec seated  HR/TR  2 x 10 reps  airex  Minisquats  2 x 10 reps airex  GSS 3 x 30 sec  1/2  roll      STM/scar mobs to R knee x 10 min to decrease soreness/tightness and help break up adhesions.      Home Exercises Provided and Patient Education Provided     Education provided:   - Educated pt that he/she may feel soreness after session.    - Educated pt to continue with exercises at home  - Educated pt on perf scar massage to help break up adhesions    Written Home Exercises Provided: Patient instructed to cont prior HEP.  Exercises were reviewed and Qiana was able to demonstrate them prior to the end of the session.  Qiana demonstrated good  understanding of the education provided.     See EMR under " Patient Instructions for exercises provided 9/2/2020.    Assessment     Noted possible suture appearing at distal end of incision  Increased tightness along lower quads/proximal incision.  Sensitive to touch      Qiana is progressing well towards her goals.   Pt prognosis is Good.     Pt will continue to benefit from skilled outpatient physical therapy to address the deficits listed in the problem list box on initial evaluation, provide pt/family education and to maximize pt's level of independence in the home and community environment.     Pt's spiritual, cultural and educational needs considered and pt agreeable to plan of care and goals.     Anticipated barriers to physical therapy: pain    Goals:   Short Term Goals: 3 weeks   1) Patient will initiate HEP (ongoing)  2) Patient will improve knee extension to 0* for ambulatory purposes (Progressing)  3) Patient will improve strength 1/2 muscle grade (Progressing)     Long Term Goals: 6 weeks   1) Patient will be independent in HEP  2) Patient will demonstrate R knee ROM 0-110*  3) Patient return to I/ADL's with strength 4/5 and pain <4/10    Plan     Continue with there ex, stretching, gait training and modalities as needed to decrease pain, improve ROM/flexibility and functional ,mobility per POC, progressing as    Lynne Oneil, PTA

## 2020-09-29 NOTE — PROGRESS NOTES
[facility] ORTHOPEDICS POST-OP NOTE    Subjective:           Chief Complaint:   Chief Complaint   Patient presents with    Right Knee - Post-op Evaluation     Right TKA 2020. States that she is doing good. States that her pain is ok, just has some soreness        Past Medical History:   Diagnosis Date    Asthma     Bone spur of other site     Diabetes mellitus, type 2     Hyperlipemia     Hypertension     Knee pain        Past Surgical History:   Procedure Laterality Date     SECTION      x2     hysterosocpy polyp removal      KNEE ARTHROPLASTY Right 2020    Procedure: ARTHROPLASTY, KNEE;  Surgeon: Silver Bob MD;  Location: [facility];  Service: Orthopedics;  Laterality: Right;  Everett De La Garza. converted to general/LMA.    KNEE ARTHROSCOPY      TUBAL LIGATION      uterin ablastion         Current Outpatient Medications   Medication Sig    albuterol (PROVENTIL/VENTOLIN HFA) 90 mcg/actuation inhaler Rescue    benzoyl peroxide (BENZAC AC) 10 % external wash WASH back nightly as tolerated    blood sugar diagnostic (SKYLER BLOOD GLUCOSE TEST STRIP) Strp 1 strip by Misc.(Non-Drug; Combo Route) route once daily.    celecoxib (CELEBREX) 200 MG capsule Take 1 capsule (200 mg total) by mouth 2 (two) times daily.    clotrimazole-betamethasone 1-0.05% (LOTRISONE) cream APPLY topically TWICE DAILY    FLOVENT  mcg/actuation inhaler Inhale 2 puffs into the lungs 2 (two) times daily.    furosemide (LASIX) 40 MG tablet Take 1 tablet (40 mg total) by mouth daily as needed (swelling).    gabapentin (NEURONTIN) 600 MG tablet TAKE 1 TABLET BY MOUTH 3 TIMES DAILY AS NEEDED FOR NERVE PAIN    lancets 33 gauge Misc 1 lancet by Misc.(Non-Drug; Combo Route) route once daily.    potassium chloride (MICRO-K) 10 MEQ CpSR Take 1 capsule (10 mEq total) by mouth once daily.    salmeterol (SEREVENT DISKUS) 50 mcg/dose diskus inhaler INHALE 1 PUFFS INTO THE LUNGS EVERY 12 HOURS    tiZANidine (ZANAFLEX)  4 MG tablet     lisinopriL 10 MG tablet Take 1 tablet (10 mg total) by mouth every morning. (Patient not taking: Reported on 9/29/2020)    metFORMIN (GLUCOPHAGE) 500 MG tablet Take 1 tablet (500 mg total) by mouth 2 (two) times daily. (Patient not taking: Reported on 9/29/2020)    nystatin (MYCOSTATIN) powder APPLY TO THE affected AREA TWICE DAILY (Patient not taking: Reported on 9/29/2020)     No current facility-administered medications for this visit.        Review of patient's allergies indicates:  No Known Allergies    Family History   Problem Relation Age of Onset    Diabetes Mother     Dementia Mother     Diabetes Father     Heart disease Father        Social History     Socioeconomic History    Marital status: Single     Spouse name: Not on file    Number of children: Not on file    Years of education: Not on file    Highest education level: Not on file   Occupational History    Not on file   Social Needs    Financial resource strain: Not on file    Food insecurity     Worry: Not on file     Inability: Not on file    Transportation needs     Medical: Not on file     Non-medical: Not on file   Tobacco Use    Smoking status: Former Smoker     Packs/day: 0.50     Types: Cigarettes    Smokeless tobacco: Never Used   Substance and Sexual Activity    Alcohol use: No    Drug use: Yes     Types: Marijuana     Comment: socially/pain    Sexual activity: Yes     Partners: Male     Birth control/protection: None   Lifestyle    Physical activity     Days per week: Not on file     Minutes per session: Not on file    Stress: Not on file   Relationships    Social connections     Talks on phone: Not on file     Gets together: Not on file     Attends Restorationism service: Not on file     Active member of club or organization: Not on file     Attends meetings of clubs or organizations: Not on file     Relationship status: Not on file   Other Topics Concern    Not on file   Social History Narrative    Not  "on file       History of present illness:  Patient here for follow-up of right total knee arthroplasty done August 24th.  Patient is doing well she wishes to discuss left knee replacement.      Review of Systems:    Musculoskeletal:  See HPI      Objective:        Physical Examination:    Vital Signs:    Vitals:    09/29/20 1426   BP: 128/76   Pulse: 86       There is no height or weight on file to calculate BMI.    This a well-developed, well nourished patient in no acute distress.  They are alert and oriented and cooperative to examination.        Examination of the right knee, the patient has some mild scar hypertrophy but no evidence of wound failure dehiscence or infection.  No significant joint hypertrophy.  Excellent range of motion she can extend to 0°, flex to about 90 °.  Examination of the left knee, patient has normal range of motion, stable anterior-posterior varus and doctor stresses.  She does have pain over the medial joint line.  Pertinent New Results:    XRAY Report / Interpretation:   AP lateral sunrise views of the right knee taken today in the office show a right total knee arthroplasty in appropriate position with no evidence of hardware failure or loosening.  No osseous abnormalities normal appearing soft tissues.    Assessment/Plan:      Stable status post right total knee arthroplasty, left knee osteoarthritis.  Patient is doing well and will continue physical therapy for her right knee.  She wishes to schedule her left knee replacement at this time.  Risks and benefits of the procedure were discussed with the patient in great detail.  Will see her back for her right knee in 6 weeks and postoperatively for the left knee.    Basilio Arce PA-C served as a scribe for this patient encounter. A "face to face" encounter occured with Dr. Silver Bob on this date. The treatment plan and medical decision making are outlined as above:    This note was created using Dragon voice recognition " software that occasionally misinterpreted phrases or words.

## 2020-10-05 ENCOUNTER — PATIENT MESSAGE (OUTPATIENT)
Dept: ADMINISTRATIVE | Facility: HOSPITAL | Age: 45
End: 2020-10-05

## 2020-10-06 ENCOUNTER — PATIENT MESSAGE (OUTPATIENT)
Dept: PRIMARY CARE CLINIC | Facility: CLINIC | Age: 45
End: 2020-10-06

## 2020-10-06 ENCOUNTER — CLINICAL SUPPORT (OUTPATIENT)
Dept: REHABILITATION | Facility: HOSPITAL | Age: 45
End: 2020-10-06
Payer: MEDICAID

## 2020-10-06 ENCOUNTER — TELEPHONE (OUTPATIENT)
Dept: PRIMARY CARE CLINIC | Facility: CLINIC | Age: 45
End: 2020-10-06

## 2020-10-06 DIAGNOSIS — R26.9 GAIT ABNORMALITY: ICD-10-CM

## 2020-10-06 PROCEDURE — 97110 THERAPEUTIC EXERCISES: CPT | Mod: PN

## 2020-10-06 NOTE — TELEPHONE ENCOUNTER
----- Message from Tatyana Jean sent at 10/6/2020 12:55 PM CDT -----  Contact: Maddy with [facility] phone [phone]  Maddy with [facility] phone [phone], Calling to inform us the patient received the flu vaccine today. Their fax machine is broke, will fax information when they get a new fax machine. Thanks.

## 2020-10-06 NOTE — PROGRESS NOTES
Physical Therapy Treatment Note     Name: Qiana Chou [facility] Number: [id number]    Therapy Diagnosis:   Encounter Diagnosis   Name Primary?    Gait abnormality      Physician: Silver Bob MD    Visit Date: 10/6/2020  Physician Orders: PT Eval and Treat   Medical Diagnosis from Referral: Z96.651 (ICD-10-CM) - Status post total knee replacement, right  Evaluation Date: 8/26/2020  Authorization Period Expiration: 12/31/2020  Plan of Care Expiration: 10/9/2020  Visit # / Visits authorized: 1/ 12    Time In: 1312  Time Out: 1352  Total Billable Time: 40 minutes    Precautions: Standard and Diabetes    Subjective     Pt reports: no new c/o..  She was compliant with home exercise program.  Response to previous treatment: good  Functional change: no    Pain: 0/10  Location:  NA     Objective     Qiana received therapeutic exercises to develop strength, endurance, ROM and flexibility for 40 minutes including:  See flowsheets    Qiana received the following manual therapy techniques: Joint mobilizations were applied to the: RT knee for 5 minutes,     Qiana participated in gait training to improve functional mobility and safety for 3  minutes,   Education provided:   - gait pattern ed    Assessment     Performed well.  Qiana is progressing well towards her goals.   Pt prognosis is Good.     Pt will continue to benefit from skilled outpatient physical therapy to address the deficits listed in the problem list box on initial evaluation, provide pt/family education and to maximize pt's level of independence in the home and community environment.     Pt's spiritual, cultural and educational needs considered and pt agreeable to plan of care and goals.     Anticipated barriers to physical therapy: NO    Goals:   Short Term Goals: 3 weeks   1) Patient will initiate HEP  2) Patient will improve knee extension to 0* for ambulatory purposes  3) Patient will improve strength 1/2 muscle grade     Long Term Goals: 6  weeks   1) Patient will be independent in HEP  2) Patient will demonstrate R knee ROM 0-110*  3) Patient return to I/ADL's with strength 4/5 and pain <4/10     Plan     Progress as able.    Lamont Butterfield, PT

## 2020-10-06 NOTE — PROGRESS NOTES
10/06/20 1300   Ankle Exercises   Double Leg Calf Raises Reps/Sets/Weight 30   Standing Dorsiflexion Stretch(Gastroc) Reps/Sets/Hold Time 3X30   Knee Exercises   SLR Flexion Reps/Sets/Weight 3X10 BL   Frontal Squats Reps/Sets/Weight 30   Quad Sets Reps/Sets/Hold Time 30   Tg/Shuttle/Reformer Squats Reps/Sets/Weight/Level 50# 7'   Lumbar Exercises   Hamsting Stretch Reps/Sets/Hold Time 3X30   Additional Exercises   Additional Exercise BIKE 10'   Additional Exercise MTT 5'

## 2020-10-08 ENCOUNTER — CLINICAL SUPPORT (OUTPATIENT)
Dept: REHABILITATION | Facility: HOSPITAL | Age: 45
End: 2020-10-08
Payer: MEDICAID

## 2020-10-08 DIAGNOSIS — R26.9 GAIT ABNORMALITY: ICD-10-CM

## 2020-10-08 PROCEDURE — 97110 THERAPEUTIC EXERCISES: CPT | Mod: PN

## 2020-10-09 DIAGNOSIS — M17.12 PRIMARY OSTEOARTHRITIS OF LEFT KNEE: Primary | ICD-10-CM

## 2020-10-09 DIAGNOSIS — Z01.818 PREOP TESTING: ICD-10-CM

## 2020-10-09 DIAGNOSIS — Z01.818 PREOPERATIVE EXAMINATION: ICD-10-CM

## 2020-10-09 DIAGNOSIS — M17.12 OSTEOARTHRITIS OF LEFT KNEE: ICD-10-CM

## 2020-10-09 RX ORDER — MUPIROCIN 20 MG/G
OINTMENT TOPICAL
Status: CANCELLED | OUTPATIENT
Start: 2020-10-09

## 2020-10-13 DIAGNOSIS — G89.29 CHRONIC BILATERAL LOW BACK PAIN WITHOUT SCIATICA: ICD-10-CM

## 2020-10-13 DIAGNOSIS — M54.50 CHRONIC BILATERAL LOW BACK PAIN WITHOUT SCIATICA: ICD-10-CM

## 2020-10-13 RX ORDER — SALMETEROL XINAFOATE 50 UG/1
POWDER, METERED ORAL; RESPIRATORY (INHALATION)
Qty: 60 EACH | Refills: 0 | Status: SHIPPED | OUTPATIENT
Start: 2020-10-13 | End: 2020-12-10

## 2020-10-13 RX ORDER — CELECOXIB 200 MG/1
CAPSULE ORAL
Qty: 60 CAPSULE | Refills: 0 | Status: SHIPPED | OUTPATIENT
Start: 2020-10-13 | End: 2020-11-13

## 2020-10-13 NOTE — PLAN OF CARE
Outpatient Therapy Discharge Summary     Name: Qiana Collins  [facility] Number: [id number]    Therapy Diagnosis:   Encounter Diagnosis   Name Primary?    Gait abnormality      Physician: Silver Bob MD    Physician Orders: PT Eval and Treat   Medical Diagnosis from Referral: Z96.651 (ICD-10-CM) - Status post total knee replacement, right  Evaluation Date: 8/26/2020    Date of Last visit: 10/8/20  Total Visits Received: 7  Cancelled Visits: 5  No Show Visits: 2    Subjective     Pt reports: improved knee function since SOC, states she is ambulating without difficulty and not having pain. She will be getting her other knee replaced in November. Reports 3-4/10 pain.     Objective     Gait: no AD; mildly decreased paddy and decreased stance time    R knee ROM 2* to 118*/122*  MMT 4+/5    Patient participating in therapeutic exercise as follows to address ROM/strength for 24 minutes:    Stationary bike x 10 min (full revolutions) for ROM purposes  Seated hamstring stretch 3/30 sec L/R  Standing gastroc stretch using 1/2 foam roll 3/30 sec B  Airex HR/TR x 20  Airex HR/TR x 20  Heel slides with sheet x 20     Assessment    Goals: Partially met    Discharge reason: Patient has reached the maximum rehab potential for the present time    Plan   This patient is discharged from Physical Therapy

## 2020-10-14 ENCOUNTER — OFFICE VISIT (OUTPATIENT)
Dept: PRIMARY CARE CLINIC | Facility: CLINIC | Age: 45
End: 2020-10-14
Payer: MEDICAID

## 2020-10-14 VITALS
BODY MASS INDEX: 43.4 KG/M2 | SYSTOLIC BLOOD PRESSURE: 120 MMHG | DIASTOLIC BLOOD PRESSURE: 78 MMHG | TEMPERATURE: 99 F | HEART RATE: 70 BPM | WEIGHT: 293 LBS | RESPIRATION RATE: 18 BRPM | HEIGHT: 69 IN | OXYGEN SATURATION: 97 %

## 2020-10-14 DIAGNOSIS — Z13.220 ENCOUNTER FOR LIPID SCREENING FOR CARDIOVASCULAR DISEASE: ICD-10-CM

## 2020-10-14 DIAGNOSIS — E11.9 ENCOUNTER FOR DIABETIC FOOT EXAM: ICD-10-CM

## 2020-10-14 DIAGNOSIS — Z11.59 NEED FOR HEPATITIS C SCREENING TEST: Primary | ICD-10-CM

## 2020-10-14 DIAGNOSIS — M54.50 CHRONIC BILATERAL LOW BACK PAIN WITHOUT SCIATICA: ICD-10-CM

## 2020-10-14 DIAGNOSIS — E11.9 TYPE 2 DIABETES MELLITUS WITHOUT COMPLICATION, WITHOUT LONG-TERM CURRENT USE OF INSULIN: ICD-10-CM

## 2020-10-14 DIAGNOSIS — Z01.419 ROUTINE GYNECOLOGICAL EXAMINATION: ICD-10-CM

## 2020-10-14 DIAGNOSIS — Z13.6 ENCOUNTER FOR LIPID SCREENING FOR CARDIOVASCULAR DISEASE: ICD-10-CM

## 2020-10-14 DIAGNOSIS — Z11.4 SCREENING FOR HIV (HUMAN IMMUNODEFICIENCY VIRUS): ICD-10-CM

## 2020-10-14 DIAGNOSIS — B37.2 INTERTRIGINOUS CANDIDIASIS: ICD-10-CM

## 2020-10-14 DIAGNOSIS — G89.29 CHRONIC BILATERAL LOW BACK PAIN WITHOUT SCIATICA: ICD-10-CM

## 2020-10-14 PROCEDURE — 99214 OFFICE O/P EST MOD 30 MIN: CPT | Mod: S$PBB,,, | Performed by: INTERNAL MEDICINE

## 2020-10-14 PROCEDURE — 99214 PR OFFICE/OUTPT VISIT, EST, LEVL IV, 30-39 MIN: ICD-10-PCS | Mod: S$PBB,,, | Performed by: INTERNAL MEDICINE

## 2020-10-14 PROCEDURE — 99999 PR PBB SHADOW E&M-EST. PATIENT-LVL V: CPT | Mod: PBBFAC,,, | Performed by: INTERNAL MEDICINE

## 2020-10-14 PROCEDURE — 99999 PR PBB SHADOW E&M-EST. PATIENT-LVL V: ICD-10-PCS | Mod: PBBFAC,,, | Performed by: INTERNAL MEDICINE

## 2020-10-14 PROCEDURE — 99215 OFFICE O/P EST HI 40 MIN: CPT | Mod: PBBFAC,PN | Performed by: INTERNAL MEDICINE

## 2020-10-14 RX ORDER — METHOCARBAMOL 750 MG/1
750 TABLET, FILM COATED ORAL 2 TIMES DAILY
Qty: 60 TABLET | Refills: 5 | Status: SHIPPED | OUTPATIENT
Start: 2020-10-14 | End: 2021-06-01

## 2020-10-14 RX ORDER — GABAPENTIN 600 MG/1
TABLET ORAL
Qty: 90 TABLET | Refills: 2 | Status: SHIPPED | OUTPATIENT
Start: 2020-10-14 | End: 2021-01-28

## 2020-10-14 RX ORDER — NYSTATIN 100000 [USP'U]/G
POWDER TOPICAL
Qty: 60 G | Refills: 1 | Status: SHIPPED | OUTPATIENT
Start: 2020-10-14

## 2020-10-14 NOTE — PROGRESS NOTES
Subjective:       Patient ID: Qiana Collins is a 45 y.o. female.    Chief Complaint: Follow-up    HPI  patient visit today for follow-up for status post right knee replacement doing quite well no complication ambulating without any assistant and right knee pain has been resolved since the surgery she will have left knee replacement next month she denies short of breath chest pain dyspnea with exertion no fever chill no nausea vomiting diarrhea patient continues to have muscle cramp and spasm current medication not helping the she also try to lose weight with diet and exercise without any success  Review of Systems    Objective:      Physical Exam  Vitals signs and nursing note reviewed.   Constitutional:       General: She is not in acute distress.     Appearance: She is well-developed. She is obese.   HENT:      Head: Normocephalic and atraumatic.      Right Ear: Tympanic membrane, ear canal and external ear normal.      Left Ear: Tympanic membrane, ear canal and external ear normal.      Nose: Nose normal.      Mouth/Throat:      Pharynx: No oropharyngeal exudate.   Eyes:      Extraocular Movements: Extraocular movements intact.      Conjunctiva/sclera: Conjunctivae normal.      Pupils: Pupils are equal, round, and reactive to light.   Neck:      Musculoskeletal: Normal range of motion and neck supple.      Thyroid: No thyromegaly.   Cardiovascular:      Rate and Rhythm: Normal rate and regular rhythm.      Heart sounds: Normal heart sounds. No murmur. No friction rub. No gallop.    Pulmonary:      Effort: Pulmonary effort is normal. No respiratory distress.      Breath sounds: Normal breath sounds. No wheezing or rales.   Abdominal:      General: Bowel sounds are normal. There is no distension.      Palpations: Abdomen is soft.      Tenderness: There is no abdominal tenderness.   Musculoskeletal: Normal range of motion.         General: No tenderness or deformity.   Lymphadenopathy:      Cervical: No  cervical adenopathy.   Skin:     General: Skin is warm and dry.      Findings: No erythema or rash.   Neurological:      General: No focal deficit present.      Mental Status: She is alert and oriented to person, place, and time.   Psychiatric:         Mood and Affect: Mood normal.         Thought Content: Thought content normal.         Judgment: Judgment normal.         Assessment:       1. Need for hepatitis C screening test    2. Type 2 diabetes mellitus without complication, without long-term current use of insulin    3. Screening for HIV (human immunodeficiency virus)    4. Intertriginous candidiasis    5. Chronic bilateral low back pain without sciatica    6. Encounter for lipid screening for cardiovascular disease    7. Routine gynecological examination    8. Encounter for diabetic foot exam    9. Encounter for diabetic foot exam        Plan:       Need for hepatitis C screening test  -     Hepatitis C Antibody; Future; Expected date: 10/14/2020    Type 2 diabetes mellitus without complication, without long-term current use of insulin  -     HM DIABETES FOOT EXAM  -     CBC auto differential; Future; Expected date: 10/14/2020  -     Comprehensive Metabolic Panel; Future; Expected date: 10/14/2020  -     Hemoglobin A1C; Future; Expected date: 10/14/2020  -     Microalbumin/Creatinine Ratio, Urine; Future; Expected date: 10/14/2020    Screening for HIV (human immunodeficiency virus)  -     HIV 1/2 Ag/Ab (4th Gen); Future; Expected date: 10/14/2020    Intertriginous candidiasis  Comments:  abdominal skin folds  Orders:  -     nystatin (MYCOSTATIN) powder; APPLY TO THE affected AREA TWICE DAILY  Dispense: 60 g; Refill: 1    Chronic bilateral low back pain without sciatica  -     gabapentin (NEURONTIN) 600 MG tablet; TAKE 1 TABLET BY MOUTH 3 TIMES DAILY AS NEEDED FOR NERVE PAIN  Dispense: 90 tablet; Refill: 2  -     methocarbamoL (ROBAXIN) 750 MG Tab; Take 1 tablet (750 mg total) by mouth 2 (two) times daily. Prn  muscle relaxant  Dispense: 60 tablet; Refill: 5    Encounter for lipid screening for cardiovascular disease  -     Lipid Panel; Future; Expected date: 10/14/2020    Routine gynecological examination  -     Ambulatory referral/consult to Obstetrics / Gynecology; Future; Expected date: 10/21/2020  -     Ambulatory referral/consult to Obstetrics / Gynecology; Future; Expected date: 10/21/2020    Encounter for diabetic foot exam  Comments:  normal foot exam bilaterally    Encounter for diabetic foot exam  Comments:  Normal exam both feet        Medication List with Changes/Refills   New Medications    METHOCARBAMOL (ROBAXIN) 750 MG TAB    Take 1 tablet (750 mg total) by mouth 2 (two) times daily. Prn muscle relaxant   Current Medications    ALBUTEROL (PROVENTIL/VENTOLIN HFA) 90 MCG/ACTUATION INHALER    Rescue    BENZOYL PEROXIDE (BENZAC AC) 10 % EXTERNAL WASH    WASH back nightly as tolerated    BLOOD SUGAR DIAGNOSTIC (SKYLER BLOOD GLUCOSE TEST STRIP) STRP    1 strip by Misc.(Non-Drug; Combo Route) route once daily.    CELECOXIB (CELEBREX) 200 MG CAPSULE    TAKE 1 CAPSULE BY MOUTH 2 TIMES DAILY    CLOTRIMAZOLE-BETAMETHASONE 1-0.05% (LOTRISONE) CREAM    APPLY topically TWICE DAILY    FLOVENT  MCG/ACTUATION INHALER    Inhale 2 puffs into the lungs 2 (two) times daily.    FUROSEMIDE (LASIX) 40 MG TABLET    Take 1 tablet (40 mg total) by mouth daily as needed (swelling).    LANCETS 33 GAUGE MISC    1 lancet by Misc.(Non-Drug; Combo Route) route once daily.    POTASSIUM CHLORIDE (MICRO-K) 10 MEQ CPSR    Take 1 capsule (10 mEq total) by mouth once daily.    SALMETEROL (SEREVENT DISKUS) 50 MCG/DOSE DISKUS INHALER    INHALE 1 PUFFS INTO THE LUNGS EVERY 12 HOURS   Changed and/or Refilled Medications    Modified Medication Previous Medication    GABAPENTIN (NEURONTIN) 600 MG TABLET gabapentin (NEURONTIN) 600 MG tablet       TAKE 1 TABLET BY MOUTH 3 TIMES DAILY AS NEEDED FOR NERVE PAIN    TAKE 1 TABLET BY MOUTH 3 TIMES DAILY  AS NEEDED FOR NERVE PAIN    NYSTATIN (MYCOSTATIN) POWDER nystatin (MYCOSTATIN) powder       APPLY TO THE affected AREA TWICE DAILY    APPLY TO THE affected AREA TWICE DAILY   Discontinued Medications    LISINOPRIL 10 MG TABLET    Take 1 tablet (10 mg total) by mouth every morning.    METFORMIN (GLUCOPHAGE) 500 MG TABLET    Take 1 tablet (500 mg total) by mouth 2 (two) times daily.    TIZANIDINE (ZANAFLEX) 4 MG TABLET

## 2020-10-26 ENCOUNTER — HOSPITAL ENCOUNTER (OUTPATIENT)
Dept: PREADMISSION TESTING | Facility: HOSPITAL | Age: 45
Discharge: HOME | End: 2020-10-26
Attending: ORTHOPAEDIC SURGERY
Payer: MEDICAID

## 2020-10-26 VITALS — HEIGHT: 69 IN | WEIGHT: 293 LBS | BODY MASS INDEX: 43.4 KG/M2

## 2020-10-26 DIAGNOSIS — M17.12 PRIMARY OSTEOARTHRITIS OF LEFT KNEE: ICD-10-CM

## 2020-10-26 DIAGNOSIS — M17.0 PRIMARY OSTEOARTHRITIS OF BOTH KNEES: Primary | ICD-10-CM

## 2020-10-26 DIAGNOSIS — Z01.818 PREOP TESTING: ICD-10-CM

## 2020-10-26 LAB
ABO + RH BLD: NORMAL
ALBUMIN SERPL BCP-MCNC: 3.9 G/DL (ref 3.5–5.2)
ALP SERPL-CCNC: 84 U/L (ref 55–135)
ALT SERPL W/O P-5'-P-CCNC: 15 U/L (ref 10–44)
ANION GAP SERPL CALC-SCNC: 13 MMOL/L (ref 8–16)
AST SERPL-CCNC: 12 U/L (ref 10–40)
BASOPHILS # BLD AUTO: 0.02 K/UL (ref 0–0.2)
BASOPHILS NFR BLD: 0.2 % (ref 0–1.9)
BILIRUB SERPL-MCNC: 0.4 MG/DL (ref 0.1–1)
BLD GP AB SCN CELLS X3 SERPL QL: NORMAL
BUN SERPL-MCNC: 12 MG/DL (ref 6–20)
CALCIUM SERPL-MCNC: 9.3 MG/DL (ref 8.7–10.5)
CHLORIDE SERPL-SCNC: 106 MMOL/L (ref 95–110)
CO2 SERPL-SCNC: 22 MMOL/L (ref 23–29)
CREAT SERPL-MCNC: 0.7 MG/DL (ref 0.5–1.4)
DIFFERENTIAL METHOD: ABNORMAL
EOSINOPHIL # BLD AUTO: 0.2 K/UL (ref 0–0.5)
EOSINOPHIL NFR BLD: 2 % (ref 0–8)
ERYTHROCYTE [DISTWIDTH] IN BLOOD BY AUTOMATED COUNT: 13.7 % (ref 11.5–14.5)
EST. GFR  (AFRICAN AMERICAN): >60 ML/MIN/1.73 M^2
EST. GFR  (NON AFRICAN AMERICAN): >60 ML/MIN/1.73 M^2
GLUCOSE SERPL-MCNC: 102 MG/DL (ref 70–110)
HCT VFR BLD AUTO: 47.4 % (ref 37–48.5)
HGB BLD-MCNC: 15.1 G/DL (ref 12–16)
IMM GRANULOCYTES # BLD AUTO: 0.02 K/UL (ref 0–0.04)
IMM GRANULOCYTES NFR BLD AUTO: 0.2 % (ref 0–0.5)
LYMPHOCYTES # BLD AUTO: 2.2 K/UL (ref 1–4.8)
LYMPHOCYTES NFR BLD: 23.4 % (ref 18–48)
MCH RBC QN AUTO: 30.9 PG (ref 27–31)
MCHC RBC AUTO-ENTMCNC: 31.9 G/DL (ref 32–36)
MCV RBC AUTO: 97 FL (ref 82–98)
MONOCYTES # BLD AUTO: 0.6 K/UL (ref 0.3–1)
MONOCYTES NFR BLD: 6.7 % (ref 4–15)
NEUTROPHILS # BLD AUTO: 6.5 K/UL (ref 1.8–7.7)
NEUTROPHILS NFR BLD: 67.5 % (ref 38–73)
NRBC BLD-RTO: 0 /100 WBC
PLATELET # BLD AUTO: 227 K/UL (ref 150–350)
PMV BLD AUTO: 11.6 FL (ref 9.2–12.9)
POTASSIUM SERPL-SCNC: 4.3 MMOL/L (ref 3.5–5.1)
PROT SERPL-MCNC: 7.9 G/DL (ref 6–8.4)
RBC # BLD AUTO: 4.89 M/UL (ref 4–5.4)
SODIUM SERPL-SCNC: 141 MMOL/L (ref 136–145)
WBC # BLD AUTO: 9.59 K/UL (ref 3.9–12.7)

## 2020-10-26 PROCEDURE — 85025 COMPLETE CBC W/AUTO DIFF WBC: CPT

## 2020-10-26 PROCEDURE — 93010 ELECTROCARDIOGRAM REPORT: CPT | Mod: ,,, | Performed by: SPECIALIST

## 2020-10-26 PROCEDURE — 93005 ELECTROCARDIOGRAM TRACING: CPT

## 2020-10-26 PROCEDURE — 86901 BLOOD TYPING SEROLOGIC RH(D): CPT

## 2020-10-26 PROCEDURE — 93010 EKG 12-LEAD: ICD-10-PCS | Mod: ,,, | Performed by: SPECIALIST

## 2020-10-26 PROCEDURE — 99900103 DSU ONLY-NO CHARGE-INITIAL HR (STAT)

## 2020-10-26 PROCEDURE — 99900104 DSU ONLY-NO CHARGE-EA ADD'L HR (STAT)

## 2020-10-26 PROCEDURE — 87081 CULTURE SCREEN ONLY: CPT

## 2020-10-26 NOTE — PRE ADMISSION SCREENING
Patient Name: Qiana Collins  YOB: 1975   MRN: [id number]     [facility]   Basic Mobility Inpatient Short Form 6 Clicks         How much difficulty does the patient currently have  Unable  A Lot  A Little  None      1. Turning over in bed (including adjusting bedclothes, sheets and blankets)?     1 []    2 []    3 []    4 [x]        2. Sitting down on and standing up from a chair with arms (e.g., wheelchair, bedside commode, etc.)     1 []  2 []  3 []     4 [x]      3. Moving from lying on back to sitting on the side of the bed?     1 []  2 []  3 []    4 [x]    How much help from another person does the patient currently need  Total  A Lot  A Little  None      4. Moving to and from a bed to a chair (including a wheelchair)?    1 []  2 []  3 []    4 [x]      5. Need to walk in hospital room?    1 []  2 []  3 []    4 [x]      6. Climbing 3-5 steps with a railing?    1 []  2 []  3 []    4 [x]       Raw Score:      24            CMS 0-100% Score:     0       %   Standardized Score:   61.14            CMS Modifier:        [facility] AMPAC   Basic Mobility Inpatient Short Form 6 Clicks Score Conversion Table*         *Use this form to convert -PAC Basic Mobility Inpatient Raw Scores.   [facility] Inpatient Basic Mobility Short Form Scoring Example   1. Add the number values associated with the response to each item. For example, items totals yield a Raw Score of 21.   2. Match the raw score to the t-Scale scores (t-Scale score = 50.25, SE = 4.69).   3. Find the associated CMS % (CMS % = 28.97%).   4. Locate the correct CMS Functional Modifier Code, or G Code (G code = CJ)     NOTE: Each -PAC Short Form has a separate conversion table. Make sure that you use the correct conversion table.       Instruction Manual - page 45 contains conversion table

## 2020-10-26 NOTE — PRE ADMISSION SCREENING
JOINT CAMP ASSESSMENT    Name Qiana Collins   MRN [id number]    Age/Sex 45 y.o. female    Surgeon Dr. Silver Bob   Joint Camp Date 10/26/2020   Surgery Date 2020   Procedure Left Knee Arthroplasty   Insurance Payor: MEDICAID / Plan: [facility] COMMUN[facility] PLAN [facility] [place] [employer] ([place] MEDICAID) / Product Type: Managed Medicaid /    Care Team Patient Care Team:  Darien Odonnell MD as PCP - General (Internal Medicine)  Silver Bob MD as Consulting Physician (Orthopedic Surgery)  Dori Chaudhary LPN as Licensed Practical Nurse    Pharmacy   [facility] - [place], [place] - [address]  [address]  [place] [address]  Phone: [phone] Fax: [phone]     AM-PAC Score   24   Risk Assessment Score 4     Past Medical History:   Diagnosis Date    Asthma     Bone spur of other site     Diabetes mellitus, type 2     Hyperlipemia     Hypertension     Knee pain        Past Surgical History:   Procedure Laterality Date     SECTION      x2     hysterosocpy polyp removal      KNEE ARTHROPLASTY Right 2020    Procedure: ARTHROPLASTY, KNEE;  Surgeon: Silver oBb MD;  [facility];  Service: Orthopedics;  Laterality: Right;  Everett De La Garza. converted to general/LMA.    KNEE ARTHROSCOPY      TUBAL LIGATION      uterin ablastion           Home Enviroment     Living Arrangement: Lives with family  Home Environment: 1-story house/ trailer, number of outside stairs: 1, tub-shower  Home Safety Concerns: Pets in the home: dogs (1).    DISCHARGE CAREGIVER/SUPPORT SYSTEM     Identified post-op caregiver: Patient has children / family / friends to help, mother and father.  Patient's caregiver(s) will be able to provide physical assistance. Patient will have someone to assist overnight.      Caregiver present at pre-op interview:  No      PRE-OPERATIVE FUNCTIONAL STATUS     Employment: Unemployed    Pre-op Functional Status: Patient is independent with mobility/ambulation, transfers, ADL's,  IADL's.    Use of assistive device for ambulation: none  ADL: self care  ADL Limitations: difficulty with walking  Medical Restrictions: Decreased range of motions in extremities    POTENTIAL BARRIERS TO DISCHARGE/POTENTIAL POST-OP COMPLICATIONS     Patient with hx of obesity, HTN, diabetes mellitus, and asthma. Patient had right knee arthroplasty on 08/24/2020 without complications.    DISCHARGE PLAN     Expected LOS of 2 days or less for joint replacement discussed with patient.     Patient in agreement with discharge plan: Yes    Discharge to: Outpatient PT and Home with 24 hour assistance     HH:  None per insurance coverage.     OP PT: [facility]     Home DME: rolling walker, bedside commode and tub transfer bench    Needed DME at D/C: none     Rx: Per Dr. Bob at discharge     Meds to Beds: N/A  Patient expected to discharge on Aspirin 81mg by mouth twice daily for DVT prophylaxis.

## 2020-10-26 NOTE — DISCHARGE INSTRUCTIONS
To confirm, Your doctor has instructed you that surgery is scheduled for:     Please report to [facility], Registration the morning of surgery. You must check-in and receive a wristband before going to your procedure.    Pre-Op will call the afternoon prior to surgery between 1:00 and 6:00 PM with the final arrival time.  Phone number: [phone]    PLEASE NOTE:  The surgery schedule has many variables which may affect the time of your surgery case.  Family members should be available if your surgery time changes.  Plan to be here the day of your procedure between 4-6 hours.    MEDICATIONS:  TAKE ONLY THESE MEDICATIONS WITH A SMALL SIP OF WATER THE MORNING OF YOUR PROCEDURE:      DO NOT TAKE THESE MEDICATIONS 5-7 DAYS PRIOR to your procedure or per your surgeon's request: ASPIRIN, ALEVE, ADVIL, IBUPROFEN, FISH OIL VITAMIN E, HERBALS  (May take Tylenol)    ONLY if you are prescribed any types of blood thinners such as:  Aspirin, Coumadin, Plavix, Pradaxa, Xarelto, Aggrenox, Effient, Eliquis, Savasya, Brilinta, or any other, ask your surgeon whether you should stop taking them and how long before surgery you should stop.  You may also need to verify with the prescribing physician if it is ok to stop your medication.      INSTRUCTIONS IMPORTANT!!  · Do not eat or drink anything between midnight and the time of your procedure- this includes gum, mints, and candy.  · Do not smoke or drink alcoholic beverages 24 hours prior to your procedure.  · Shower the night before AND the morning of your procedure with a Chlorhexidine wash such as Hibiclens or Dial antibacterial soap from the neck down.  Do not get it on your face or in your eyes.  You may use your own shampoo and face wash. This helps your skin to be as bacteria free as possible.    · If you wear contact lenses, dentures, hearing aids or glasses, bring a container to put them in during surgery and give to a family member for safe keeping.   Please leave all jewelry, piercing's and valuables at home.   · DO NOT remove hair from the surgery site.  Do not shave the incision site unless you are given specific instructions to do so.    · ONLY if you have been diagnosed with sleep apnea please bring your C-PAP machine.  · ONLY if you wear home oxygen please bring your portable oxygen tank the day of your procedure.  · ONLY if you have a history of OPEN HEART SURGERY you will need a clearance from your Cardiologist per Anesthesia.      · ONLY for patients requiring bowel prep, written instructions will be given by your doctor's office.  · ONLY if you have a neuro stimulator, please bring the controller with you the morning of surgery  · ONLY if a type and screen test is needed before surgery, please return:  · If your doctor has scheduled you for an overnight stay, bring a small overnight bag with any personal items you need.  · Make arrangements in advance for transportation home by a responsible adult.  It is not safe to drive a vehicle during the 24 hours after anesthesia.     · ONLY ONE VISITOR PER PATIENT IS ALLOWED TO COME IN THE HOSPITAL THE DAY OF PROCEDURE.   · Visiting hours are 8:00AM-6:00PM. For the safety of all patients, visitors under the age of 12 are not allowed above the first floor of the hospital.    · All [facility] facilities and properties are tobacco free.  Smoking is NOT allowed.       If you have any questions about these instructions, call Pre-Op Admit  Nursing at [phone] or the Pre-Op Day Surgery Unit at [phone].

## 2020-10-26 NOTE — PRE ADMISSION SCREENING
"               [facility] Risk Assessment Scale    Patient Name: Qiana Collins  YOB: 1975  MRN: [id number]            RIsk Factor Measure Recommendation Patient Data Scale/Score   BMI >40 Reconsider surgery, weight loss   Estimated body mass index is 44.6 kg/m² as calculated from the following:    Height as of this encounter: 5' 9" (1.753 m).    Weight as of this encounter: 137 kg (302 lb).   [] 0 = 1 - 24.9  [] 1 = 25-29.9  [] 2 = 30-34.9  [] 3 = 35-39.9  [x] 4 = 40-44.9  [] 5 = 45-99.9   Hemoglobin AIC (if applicable) >9 Delay surgery until DM under control  Refer for:  · Nutrition Therapy  · Exercise   · Medication    No results found for: LABA1C, HGBA1C    Lab Results   Component Value Date     10/26/2020      [] 0 = 4.0-5.6  [] 1 = 5.7-6.4  [] 2 = 6.5-6.9  [] 3 = 7.0-7.9  [] 4 = 8.0-8.9  [] 5 = 9.0-12   Hemoglobin (Anemia) <9 Delay surgery   Correct anemia Lab Results   Component Value Date    HGB 15.1 10/26/2020    HGB 15.1 10/26/2020    [] 20 - <9.0                    Albumin <3 Delay surgery &Workup Lab Results   Component Value Date    ALBUMIN 3.8 10/26/2020    [] 20 - <3.0   Smoking Cessation >4 Weeks Delay Surgery  Refer to OP Cessation Class    Former Smoker [] 20 - current smoker                                _____ PPD                    Hx of MI, PE, Arrhythmia, CVA, DVT <30 Days Delay Surgery    N/A [] 20      Infection Variable Delay surgery and re-evaluate   N/A [] 20 - recent/current infection     Depression (PHQ) >10 out of 27 Delay Surgery and re-evaluate  Medication  Counseling              [x] 0     []1     []2     []3      []4      [] 5                    (1-4)      (5-9)  (10-14)  (15-19)   (20-27)     Memory Impairment & Memory loss (Mini-Cog Screening Tool) Advanced dementia and/or Parkinson's Reconsider surgery     [x] 0     []1     []2     []3     []4     [] 5     Physical Conditioning (Modified AM-PAC Per Physical Therapy at [facility]) Unable to ambulate on day of " surgery Delay surgery and re-evaluate  Pre-Rehabilitation   (PT evaluation)       [x]  0   []4       []8     []12        []16     []20       (<20%)   (<40%)   (<60%)   (<80% )    (>80%)     Home Environment/Caregiver support  (Per /Navigator Interview)    Availability of basic services and/or approprate assistance during post-operative period Delay surgery and re-evaluate  Safe home environment  Health   1 week post-surgery  Transportation  availability  Ability to obtain DME/Medications post-op    [x] 0     []1     []2     []3     []4     [] 5  [x] 0     []1     []2     []3     []4     [] 5  [x] 0     []1     []2     []3     []4     [] 5  [x] 0     []1     []2     []3     []4     [] 5         MD Contact: Dr. Bob Comments:  Total Score:  4

## 2020-10-28 LAB — MRSA SPEC QL CULT: NORMAL

## 2020-11-04 DIAGNOSIS — Z96.652 STATUS POST TOTAL KNEE REPLACEMENT, LEFT: Primary | ICD-10-CM

## 2020-11-06 ENCOUNTER — ANESTHESIA EVENT (OUTPATIENT)
Dept: SURGERY | Facility: HOSPITAL | Age: 45
End: 2020-11-06
Payer: MEDICAID

## 2020-11-06 ENCOUNTER — LAB VISIT (OUTPATIENT)
Dept: PRIMARY CARE CLINIC | Facility: CLINIC | Age: 45
End: 2020-11-06
Payer: MEDICAID

## 2020-11-06 DIAGNOSIS — Z01.818 PREOPERATIVE EXAMINATION: ICD-10-CM

## 2020-11-06 PROCEDURE — U0003 INFECTIOUS AGENT DETECTION BY NUCLEIC ACID (DNA OR RNA); SEVERE ACUTE RESPIRATORY SYNDROME CORONAVIRUS 2 (SARS-COV-2) (CORONAVIRUS DISEASE [COVID-19]), AMPLIFIED PROBE TECHNIQUE, MAKING USE OF HIGH THROUGHPUT TECHNOLOGIES AS DESCRIBED BY CMS-2020-01-R: HCPCS

## 2020-11-06 RX ORDER — SODIUM CHLORIDE, SODIUM LACTATE, POTASSIUM CHLORIDE, CALCIUM CHLORIDE 600; 310; 30; 20 MG/100ML; MG/100ML; MG/100ML; MG/100ML
500 INJECTION, SOLUTION INTRAVENOUS ONCE
Status: CANCELLED | OUTPATIENT
Start: 2020-11-06 | End: 2020-11-06

## 2020-11-07 LAB — SARS-COV-2 RNA RESP QL NAA+PROBE: NOT DETECTED

## 2020-11-09 ENCOUNTER — HOSPITAL ENCOUNTER (OUTPATIENT)
Facility: HOSPITAL | Age: 45
Discharge: HOME | End: 2020-11-10
Attending: ORTHOPAEDIC SURGERY | Admitting: ORTHOPAEDIC SURGERY
Payer: MEDICAID

## 2020-11-09 ENCOUNTER — ANESTHESIA (OUTPATIENT)
Dept: SURGERY | Facility: HOSPITAL | Age: 45
End: 2020-11-09
Payer: MEDICAID

## 2020-11-09 DIAGNOSIS — M17.12 PRIMARY OSTEOARTHRITIS OF LEFT KNEE: ICD-10-CM

## 2020-11-09 DIAGNOSIS — Z01.818 PREOP TESTING: ICD-10-CM

## 2020-11-09 DIAGNOSIS — M17.12 OSTEOARTHRITIS OF LEFT KNEE: Primary | ICD-10-CM

## 2020-11-09 LAB
B-HCG UR QL: NEGATIVE
CTP QC/QA: YES

## 2020-11-09 PROCEDURE — 94761 N-INVAS EAR/PLS OXIMETRY MLT: CPT | Mod: 59

## 2020-11-09 PROCEDURE — 27200750 HC INSULATED NEEDLE/ STIMUPLEX: Performed by: ANESTHESIOLOGY

## 2020-11-09 PROCEDURE — D9220A PRA ANESTHESIA: Mod: ANES,,, | Performed by: ANESTHESIOLOGY

## 2020-11-09 PROCEDURE — 99900104 DSU ONLY-NO CHARGE-EA ADD'L HR (STAT): Performed by: ORTHOPAEDIC SURGERY

## 2020-11-09 PROCEDURE — 76942 PR U/S GUIDANCE FOR NEEDLE GUIDANCE: ICD-10-PCS | Mod: 26,,, | Performed by: ANESTHESIOLOGY

## 2020-11-09 PROCEDURE — 97161 PT EVAL LOW COMPLEX 20 MIN: CPT

## 2020-11-09 PROCEDURE — 25000003 PHARM REV CODE 250: Performed by: ANESTHESIOLOGY

## 2020-11-09 PROCEDURE — C1776 JOINT DEVICE (IMPLANTABLE): HCPCS | Performed by: ORTHOPAEDIC SURGERY

## 2020-11-09 PROCEDURE — 63600175 PHARM REV CODE 636 W HCPCS

## 2020-11-09 PROCEDURE — 63600175 PHARM REV CODE 636 W HCPCS: Performed by: NURSE ANESTHETIST, CERTIFIED REGISTERED

## 2020-11-09 PROCEDURE — 99900103 DSU ONLY-NO CHARGE-INITIAL HR (STAT): Performed by: ORTHOPAEDIC SURGERY

## 2020-11-09 PROCEDURE — 01402 ANES OPN/ARTH TOT KNE ARTHRP: CPT | Performed by: ORTHOPAEDIC SURGERY

## 2020-11-09 PROCEDURE — 25000003 PHARM REV CODE 250: Performed by: INTERNAL MEDICINE

## 2020-11-09 PROCEDURE — 63600175 PHARM REV CODE 636 W HCPCS: Performed by: ANESTHESIOLOGY

## 2020-11-09 PROCEDURE — 27200688 HC TRAY, SPINAL-HYPER/ ISOBARIC: Performed by: ANESTHESIOLOGY

## 2020-11-09 PROCEDURE — 36000711: Performed by: ORTHOPAEDIC SURGERY

## 2020-11-09 PROCEDURE — 76942 ECHO GUIDE FOR BIOPSY: CPT | Mod: 26,,, | Performed by: ANESTHESIOLOGY

## 2020-11-09 PROCEDURE — D9220A PRA ANESTHESIA: Mod: CRNA,,, | Performed by: NURSE ANESTHETIST, CERTIFIED REGISTERED

## 2020-11-09 PROCEDURE — 64447 NJX AA&/STRD FEMORAL NRV IMG: CPT | Performed by: ANESTHESIOLOGY

## 2020-11-09 PROCEDURE — C1713 ANCHOR/SCREW BN/BN,TIS/BN: HCPCS | Performed by: ORTHOPAEDIC SURGERY

## 2020-11-09 PROCEDURE — D9220A PRA ANESTHESIA: ICD-10-PCS | Mod: CRNA,,, | Performed by: NURSE ANESTHETIST, CERTIFIED REGISTERED

## 2020-11-09 PROCEDURE — 97110 THERAPEUTIC EXERCISES: CPT

## 2020-11-09 PROCEDURE — 36000710: Performed by: ORTHOPAEDIC SURGERY

## 2020-11-09 PROCEDURE — 37000008 HC ANESTHESIA 1ST 15 MINUTES: Performed by: ORTHOPAEDIC SURGERY

## 2020-11-09 PROCEDURE — 25000003 PHARM REV CODE 250: Performed by: NURSE ANESTHETIST, CERTIFIED REGISTERED

## 2020-11-09 PROCEDURE — 71000039 HC RECOVERY, EACH ADD'L HOUR: Performed by: ORTHOPAEDIC SURGERY

## 2020-11-09 PROCEDURE — 37000009 HC ANESTHESIA EA ADD 15 MINS: Performed by: ORTHOPAEDIC SURGERY

## 2020-11-09 PROCEDURE — 27201423 OPTIME MED/SURG SUP & DEVICES STERILE SUPPLY: Performed by: ORTHOPAEDIC SURGERY

## 2020-11-09 PROCEDURE — 63600175 PHARM REV CODE 636 W HCPCS: Performed by: ORTHOPAEDIC SURGERY

## 2020-11-09 PROCEDURE — 25000003 PHARM REV CODE 250: Performed by: ORTHOPAEDIC SURGERY

## 2020-11-09 PROCEDURE — D9220A PRA ANESTHESIA: ICD-10-PCS | Mod: ANES,,, | Performed by: ANESTHESIOLOGY

## 2020-11-09 PROCEDURE — 81025 URINE PREGNANCY TEST: CPT | Performed by: ANESTHESIOLOGY

## 2020-11-09 PROCEDURE — 64447 PR NERVE BLOCK INJ, ANES/STEROID, FEMORAL, INCL IMAG GUIDANCE: ICD-10-PCS | Mod: 59,LT,, | Performed by: ANESTHESIOLOGY

## 2020-11-09 PROCEDURE — C9290 INJ, BUPIVACAINE LIPOSOME: HCPCS | Performed by: ANESTHESIOLOGY

## 2020-11-09 PROCEDURE — 71000033 HC RECOVERY, INTIAL HOUR: Performed by: ORTHOPAEDIC SURGERY

## 2020-11-09 PROCEDURE — S0020 INJECTION, BUPIVICAINE HYDRO: HCPCS | Performed by: ANESTHESIOLOGY

## 2020-11-09 PROCEDURE — 64447 NJX AA&/STRD FEMORAL NRV IMG: CPT | Mod: 59,LT,, | Performed by: ANESTHESIOLOGY

## 2020-11-09 DEVICE — IMPLANTABLE DEVICE: Type: IMPLANTABLE DEVICE | Site: KNEE | Status: FUNCTIONAL

## 2020-11-09 DEVICE — TIBIAL EVOLUTION SZ 5 LEFT: Type: IMPLANTABLE DEVICE | Site: KNEE | Status: FUNCTIONAL

## 2020-11-09 DEVICE — PATELLA ONLAY 35MM TRI PEG: Type: IMPLANTABLE DEVICE | Site: KNEE | Status: FUNCTIONAL

## 2020-11-09 DEVICE — COMP FEM PRI EVOLUTION SZ5 L: Type: IMPLANTABLE DEVICE | Site: KNEE | Status: FUNCTIONAL

## 2020-11-09 DEVICE — CEMENT BONE ORTHOSET 1 40 GRAM: Type: IMPLANTABLE DEVICE | Site: KNEE | Status: FUNCTIONAL

## 2020-11-09 RX ORDER — LIDOCAINE HYDROCHLORIDE 20 MG/ML
INJECTION INTRAVENOUS
Status: DISCONTINUED | OUTPATIENT
Start: 2020-11-09 | End: 2020-11-09

## 2020-11-09 RX ORDER — PROPOFOL 10 MG/ML
VIAL (ML) INTRAVENOUS
Status: DISCONTINUED | OUTPATIENT
Start: 2020-11-09 | End: 2020-11-09

## 2020-11-09 RX ORDER — BISACODYL 10 MG
10 SUPPOSITORY, RECTAL RECTAL DAILY
Status: DISCONTINUED | OUTPATIENT
Start: 2020-11-09 | End: 2020-11-10 | Stop reason: HOSPADM

## 2020-11-09 RX ORDER — OXYCODONE HCL 10 MG/1
10 TABLET, FILM COATED, EXTENDED RELEASE ORAL EVERY 12 HOURS
Status: DISCONTINUED | OUTPATIENT
Start: 2020-11-09 | End: 2020-11-10 | Stop reason: HOSPADM

## 2020-11-09 RX ORDER — PREGABALIN 75 MG/1
75 CAPSULE ORAL 2 TIMES DAILY
Status: DISCONTINUED | OUTPATIENT
Start: 2020-11-09 | End: 2020-11-10 | Stop reason: HOSPADM

## 2020-11-09 RX ORDER — ONDANSETRON 8 MG/1
8 TABLET, ORALLY DISINTEGRATING ORAL EVERY 8 HOURS PRN
Status: DISCONTINUED | OUTPATIENT
Start: 2020-11-09 | End: 2020-11-10 | Stop reason: HOSPADM

## 2020-11-09 RX ORDER — OXYCODONE AND ACETAMINOPHEN 7.5; 325 MG/1; MG/1
1 TABLET ORAL EVERY 4 HOURS PRN
Qty: 28 TABLET | Refills: 0 | Status: SHIPPED | OUTPATIENT
Start: 2020-11-09 | End: 2020-11-23 | Stop reason: SDUPTHER

## 2020-11-09 RX ORDER — OXYCODONE HCL 10 MG/1
10 TABLET, FILM COATED, EXTENDED RELEASE ORAL
Status: COMPLETED | OUTPATIENT
Start: 2020-11-09 | End: 2020-11-09

## 2020-11-09 RX ORDER — ZOLPIDEM TARTRATE 5 MG/1
5 TABLET ORAL NIGHTLY PRN
Status: DISCONTINUED | OUTPATIENT
Start: 2020-11-09 | End: 2020-11-10 | Stop reason: HOSPADM

## 2020-11-09 RX ORDER — FENTANYL CITRATE 50 UG/ML
INJECTION, SOLUTION INTRAMUSCULAR; INTRAVENOUS
Status: DISCONTINUED | OUTPATIENT
Start: 2020-11-09 | End: 2020-11-09

## 2020-11-09 RX ORDER — ONDANSETRON 2 MG/ML
4 INJECTION INTRAMUSCULAR; INTRAVENOUS ONCE AS NEEDED
Status: COMPLETED | OUTPATIENT
Start: 2020-11-09 | End: 2020-11-09

## 2020-11-09 RX ORDER — CELECOXIB 100 MG/1
100 CAPSULE ORAL 2 TIMES DAILY
Status: DISCONTINUED | OUTPATIENT
Start: 2020-11-09 | End: 2020-11-10 | Stop reason: HOSPADM

## 2020-11-09 RX ORDER — LIDOCAINE HYDROCHLORIDE 10 MG/ML
1 INJECTION, SOLUTION EPIDURAL; INFILTRATION; INTRACAUDAL; PERINEURAL ONCE
Status: DISCONTINUED | OUTPATIENT
Start: 2020-11-09 | End: 2020-11-09 | Stop reason: HOSPADM

## 2020-11-09 RX ORDER — ONDANSETRON 2 MG/ML
INJECTION INTRAMUSCULAR; INTRAVENOUS
Status: DISCONTINUED | OUTPATIENT
Start: 2020-11-09 | End: 2020-11-09

## 2020-11-09 RX ORDER — CELECOXIB 100 MG/1
200 CAPSULE ORAL 2 TIMES DAILY
Status: CANCELLED | OUTPATIENT
Start: 2020-11-09

## 2020-11-09 RX ORDER — OXYCODONE HYDROCHLORIDE 10 MG/1
10 TABLET ORAL EVERY 4 HOURS PRN
Status: DISCONTINUED | OUTPATIENT
Start: 2020-11-09 | End: 2020-11-09 | Stop reason: SDUPTHER

## 2020-11-09 RX ORDER — CEFAZOLIN SODIUM 2 G/50ML
2 SOLUTION INTRAVENOUS
Status: DISCONTINUED | OUTPATIENT
Start: 2020-11-09 | End: 2020-11-09 | Stop reason: SDUPTHER

## 2020-11-09 RX ORDER — FAMOTIDINE 20 MG/1
20 TABLET, FILM COATED ORAL 2 TIMES DAILY
Status: DISCONTINUED | OUTPATIENT
Start: 2020-11-09 | End: 2020-11-10 | Stop reason: HOSPADM

## 2020-11-09 RX ORDER — NAPROXEN SODIUM 220 MG/1
81 TABLET, FILM COATED ORAL 2 TIMES DAILY
Status: DISCONTINUED | OUTPATIENT
Start: 2020-11-09 | End: 2020-11-10 | Stop reason: HOSPADM

## 2020-11-09 RX ORDER — TRANEXAMIC ACID 100 MG/ML
1000 INJECTION, SOLUTION INTRAVENOUS
Status: DISCONTINUED | OUTPATIENT
Start: 2020-11-09 | End: 2020-11-09 | Stop reason: HOSPADM

## 2020-11-09 RX ORDER — FENTANYL CITRATE 50 UG/ML
25 INJECTION, SOLUTION INTRAMUSCULAR; INTRAVENOUS EVERY 5 MIN PRN
Status: DISCONTINUED | OUTPATIENT
Start: 2020-11-09 | End: 2020-11-09 | Stop reason: HOSPADM

## 2020-11-09 RX ORDER — OXYCODONE HYDROCHLORIDE 5 MG/1
5 TABLET ORAL ONCE AS NEEDED
Status: DISCONTINUED | OUTPATIENT
Start: 2020-11-09 | End: 2020-11-09 | Stop reason: HOSPADM

## 2020-11-09 RX ORDER — OXYCODONE HYDROCHLORIDE 10 MG/1
10 TABLET ORAL EVERY 6 HOURS PRN
Status: DISCONTINUED | OUTPATIENT
Start: 2020-11-09 | End: 2020-11-10

## 2020-11-09 RX ORDER — PREGABALIN 75 MG/1
75 CAPSULE ORAL
Status: COMPLETED | OUTPATIENT
Start: 2020-11-09 | End: 2020-11-09

## 2020-11-09 RX ORDER — BUPIVACAINE HYDROCHLORIDE 7.5 MG/ML
INJECTION, SOLUTION EPIDURAL; RETROBULBAR
Status: DISCONTINUED | OUTPATIENT
Start: 2020-11-09 | End: 2020-11-09

## 2020-11-09 RX ORDER — CEFAZOLIN SODIUM 2 G/50ML
2 SOLUTION INTRAVENOUS
Status: COMPLETED | OUTPATIENT
Start: 2020-11-09 | End: 2020-11-09

## 2020-11-09 RX ORDER — ACETAMINOPHEN 10 MG/ML
1000 INJECTION, SOLUTION INTRAVENOUS EVERY 8 HOURS
Status: COMPLETED | OUTPATIENT
Start: 2020-11-09 | End: 2020-11-09

## 2020-11-09 RX ORDER — SODIUM CHLORIDE 0.9 % (FLUSH) 0.9 %
3 SYRINGE (ML) INJECTION EVERY 8 HOURS
Status: DISCONTINUED | OUTPATIENT
Start: 2020-11-09 | End: 2020-11-09

## 2020-11-09 RX ORDER — CELECOXIB 100 MG/1
200 CAPSULE ORAL
Status: COMPLETED | OUTPATIENT
Start: 2020-11-09 | End: 2020-11-09

## 2020-11-09 RX ORDER — SODIUM CHLORIDE 0.9 % (FLUSH) 0.9 %
5 SYRINGE (ML) INJECTION
Status: DISCONTINUED | OUTPATIENT
Start: 2020-11-09 | End: 2020-11-10 | Stop reason: HOSPADM

## 2020-11-09 RX ORDER — PHENYLEPHRINE HYDROCHLORIDE 10 MG/ML
INJECTION INTRAVENOUS
Status: DISCONTINUED | OUTPATIENT
Start: 2020-11-09 | End: 2020-11-09

## 2020-11-09 RX ORDER — MAG HYDROX/ALUMINUM HYD/SIMETH 200-200-20
30 SUSPENSION, ORAL (FINAL DOSE FORM) ORAL
Status: DISCONTINUED | OUTPATIENT
Start: 2020-11-09 | End: 2020-11-10 | Stop reason: HOSPADM

## 2020-11-09 RX ORDER — MIDAZOLAM HYDROCHLORIDE 1 MG/ML
0.5 INJECTION INTRAMUSCULAR; INTRAVENOUS
Status: DISCONTINUED | OUTPATIENT
Start: 2020-11-09 | End: 2020-11-09 | Stop reason: HOSPADM

## 2020-11-09 RX ORDER — SODIUM CHLORIDE AND POTASSIUM CHLORIDE 150; 900 MG/100ML; MG/100ML
INJECTION, SOLUTION INTRAVENOUS CONTINUOUS
Status: DISCONTINUED | OUTPATIENT
Start: 2020-11-09 | End: 2020-11-10

## 2020-11-09 RX ORDER — PROPOFOL 10 MG/ML
VIAL (ML) INTRAVENOUS CONTINUOUS PRN
Status: DISCONTINUED | OUTPATIENT
Start: 2020-11-09 | End: 2020-11-09

## 2020-11-09 RX ORDER — MIDAZOLAM HYDROCHLORIDE 1 MG/ML
INJECTION, SOLUTION INTRAMUSCULAR; INTRAVENOUS
Status: DISCONTINUED | OUTPATIENT
Start: 2020-11-09 | End: 2020-11-09

## 2020-11-09 RX ORDER — OXYCODONE HYDROCHLORIDE 5 MG/1
5 TABLET ORAL
Status: DISCONTINUED | OUTPATIENT
Start: 2020-11-09 | End: 2020-11-10 | Stop reason: HOSPADM

## 2020-11-09 RX ORDER — KETAMINE HYDROCHLORIDE 100 MG/ML
INJECTION, SOLUTION INTRAMUSCULAR; INTRAVENOUS
Status: DISCONTINUED | OUTPATIENT
Start: 2020-11-09 | End: 2020-11-09

## 2020-11-09 RX ORDER — BUPIVACAINE HYDROCHLORIDE 5 MG/ML
INJECTION, SOLUTION EPIDURAL; INTRACAUDAL
Status: DISCONTINUED | OUTPATIENT
Start: 2020-11-09 | End: 2020-11-09

## 2020-11-09 RX ORDER — TRANEXAMIC ACID 100 MG/ML
INJECTION, SOLUTION INTRAVENOUS
Status: DISCONTINUED | OUTPATIENT
Start: 2020-11-09 | End: 2020-11-09

## 2020-11-09 RX ORDER — POLYETHYLENE GLYCOL 3350 17 G/17G
17 POWDER, FOR SOLUTION ORAL DAILY
Status: DISCONTINUED | OUTPATIENT
Start: 2020-11-09 | End: 2020-11-10 | Stop reason: HOSPADM

## 2020-11-09 RX ORDER — MUPIROCIN 20 MG/G
OINTMENT TOPICAL
Status: DISCONTINUED | OUTPATIENT
Start: 2020-11-09 | End: 2020-11-09 | Stop reason: HOSPADM

## 2020-11-09 RX ORDER — ACETAMINOPHEN 10 MG/ML
1000 INJECTION, SOLUTION INTRAVENOUS ONCE
Status: COMPLETED | OUTPATIENT
Start: 2020-11-09 | End: 2020-11-09

## 2020-11-09 RX ORDER — MORPHINE SULFATE 2 MG/ML
2 INJECTION, SOLUTION INTRAMUSCULAR; INTRAVENOUS EVERY 4 HOURS PRN
Status: DISCONTINUED | OUTPATIENT
Start: 2020-11-09 | End: 2020-11-10 | Stop reason: HOSPADM

## 2020-11-09 RX ADMIN — PROPOFOL 50 MCG/KG/MIN: 10 INJECTION, EMULSION INTRAVENOUS at 07:11

## 2020-11-09 RX ADMIN — BUPIVACAINE HYDROCHLORIDE 2 ML: 7.5 INJECTION, SOLUTION EPIDURAL; RETROBULBAR at 01:11

## 2020-11-09 RX ADMIN — MUPIROCIN: 20 OINTMENT TOPICAL at 06:11

## 2020-11-09 RX ADMIN — OXYCODONE 5 MG: 5 TABLET ORAL at 08:11

## 2020-11-09 RX ADMIN — SODIUM CHLORIDE, SODIUM GLUCONATE, SODIUM ACETATE, POTASSIUM CHLORIDE, MAGNESIUM CHLORIDE, SODIUM PHOSPHATE, DIBASIC, AND POTASSIUM PHOSPHATE: .53; .5; .37; .037; .03; .012; .00082 INJECTION, SOLUTION INTRAVENOUS at 07:11

## 2020-11-09 RX ADMIN — OXYCODONE HYDROCHLORIDE 10 MG: 10 TABLET, FILM COATED, EXTENDED RELEASE ORAL at 08:11

## 2020-11-09 RX ADMIN — ACETAMINOPHEN 1000 MG: 10 INJECTION, SOLUTION INTRAVENOUS at 03:11

## 2020-11-09 RX ADMIN — ONDANSETRON 4 MG: 2 INJECTION INTRAMUSCULAR; INTRAVENOUS at 09:11

## 2020-11-09 RX ADMIN — OXYCODONE 5 MG: 5 TABLET ORAL at 06:11

## 2020-11-09 RX ADMIN — CELECOXIB 100 MG: 100 CAPSULE ORAL at 08:11

## 2020-11-09 RX ADMIN — OXYCODONE HYDROCHLORIDE 10 MG: 10 TABLET ORAL at 02:11

## 2020-11-09 RX ADMIN — CEFAZOLIN SODIUM 2 G: 2 SOLUTION INTRAVENOUS at 07:11

## 2020-11-09 RX ADMIN — ACETAMINOPHEN 1000 MG: 10 INJECTION, SOLUTION INTRAVENOUS at 06:11

## 2020-11-09 RX ADMIN — ROPIVACAINE HYDROCHLORIDE: 5 INJECTION, SOLUTION EPIDURAL; INFILTRATION; PERINEURAL at 08:11

## 2020-11-09 RX ADMIN — ONDANSETRON 4 MG: 2 INJECTION, SOLUTION INTRAMUSCULAR; INTRAVENOUS at 07:11

## 2020-11-09 RX ADMIN — BUPIVACAINE 20 ML: 13.3 INJECTION, SUSPENSION, LIPOSOMAL INFILTRATION at 06:11

## 2020-11-09 RX ADMIN — SODIUM CHLORIDE, SODIUM GLUCONATE, SODIUM ACETATE, POTASSIUM CHLORIDE, MAGNESIUM CHLORIDE, SODIUM PHOSPHATE, DIBASIC, AND POTASSIUM PHOSPHATE: .53; .5; .37; .037; .03; .012; .00082 INJECTION, SOLUTION INTRAVENOUS at 06:11

## 2020-11-09 RX ADMIN — BUPIVACAINE HYDROCHLORIDE 10 ML: 5 INJECTION, SOLUTION EPIDURAL; INTRACAUDAL; PERINEURAL at 06:11

## 2020-11-09 RX ADMIN — DEXTROSE 2 G: 50 INJECTION, SOLUTION INTRAVENOUS at 04:11

## 2020-11-09 RX ADMIN — PREGABALIN 75 MG: 75 CAPSULE ORAL at 06:11

## 2020-11-09 RX ADMIN — FAMOTIDINE 20 MG: 20 TABLET ORAL at 08:11

## 2020-11-09 RX ADMIN — ASPIRIN 81 MG: 81 TABLET, CHEWABLE ORAL at 08:11

## 2020-11-09 RX ADMIN — CELECOXIB 200 MG: 100 CAPSULE ORAL at 06:11

## 2020-11-09 RX ADMIN — MIDAZOLAM 2 MG: 1 INJECTION INTRAMUSCULAR; INTRAVENOUS at 06:11

## 2020-11-09 RX ADMIN — OXYCODONE HYDROCHLORIDE 10 MG: 10 TABLET, FILM COATED, EXTENDED RELEASE ORAL at 06:11

## 2020-11-09 RX ADMIN — FENTANYL CITRATE 100 MCG: 50 INJECTION, SOLUTION INTRAMUSCULAR; INTRAVENOUS at 06:11

## 2020-11-09 RX ADMIN — SODIUM CHLORIDE AND POTASSIUM CHLORIDE: 9; 1.49 INJECTION, SOLUTION INTRAVENOUS at 10:11

## 2020-11-09 RX ADMIN — TRANEXAMIC ACID 1000 MG: 100 INJECTION, SOLUTION INTRAVENOUS at 07:11

## 2020-11-09 RX ADMIN — PREGABALIN 75 MG: 75 CAPSULE ORAL at 08:11

## 2020-11-09 RX ADMIN — KETAMINE HYDROCHLORIDE 50 MG: 100 INJECTION, SOLUTION, CONCENTRATE INTRAMUSCULAR; INTRAVENOUS at 07:11

## 2020-11-09 RX ADMIN — PHENYLEPHRINE HYDROCHLORIDE 100 MCG: 10 INJECTION INTRAVENOUS at 07:11

## 2020-11-09 RX ADMIN — ASPIRIN 81 MG: 81 TABLET, CHEWABLE ORAL at 11:11

## 2020-11-09 RX ADMIN — ALUMINUM HYDROXIDE, MAGNESIUM HYDROXIDE, AND SIMETHICONE 30 ML: 200; 200; 20 SUSPENSION ORAL at 08:11

## 2020-11-09 RX ADMIN — PROPOFOL 20 MG: 10 INJECTION, EMULSION INTRAVENOUS at 07:11

## 2020-11-09 RX ADMIN — FAMOTIDINE 20 MG: 20 TABLET ORAL at 01:11

## 2020-11-09 RX ADMIN — LIDOCAINE HYDROCHLORIDE 20 MG: 20 INJECTION, SOLUTION INTRAVENOUS at 07:11

## 2020-11-09 NOTE — PT/OT/SLP EVAL
Physical Therapy Evaluation    Patient Name:  Qiana Collins   MRN:  [id number]    Recommendations:     Discharge Recommendations:  home, home health PT   Discharge Equipment Recommendations: none   Barriers to discharge: None    Assessment:     Qiana Collins is a 45 y.o. female admitted with a medical diagnosis of Osteoarthritis of left knee.  She presents with the following impairments/functional limitations:  weakness, impaired endurance, impaired functional mobilty, gait instability, impaired balance, decreased lower extremity function, decreased ROM, pain, edema, orthopedic precautions .  Patient readily agreeable to PT evaluation to include gait training and exercise this afternoon.  Patient presented supine in bed and was able to transfer to sitting with SBA and stood with CGA.  Patient then ambulated x 250 feet with RW with CGA using step thru gait pattern but with decreased step length bilaterally.      Rehab Prognosis: Good; patient would benefit from acute skilled PT services to address these deficits and reach maximum level of function.    Recent Surgery: Procedure(s) (LRB):  ARTHROPLASTY, KNEE (Left) Day of Surgery    Plan:     During this hospitalization, patient to be seen BID to address the identified rehab impairments via gait training, therapeutic activities, therapeutic exercises and progress toward the following goals:    · Plan of Care Expires:  12/07/20    Subjective     Chief Complaint: pain  Patient/Family Comments/goals: go home  Pain/Comfort:  · Pain Rating 1: 2/10  · Location - Side 1: Left  · Location - Orientation 1: lower  · Location 1: knee  · Pain Addressed 1: Reposition, Cessation of Activity  · Pain Rating Post-Intervention 1: 8/10(increased with gait and exercises)    Patients cultural, spiritual, Anglican conflicts given the current situation:      Living Environment:  Currently lives with family in [address] [place] [address].  Prior to admission, patients level of function was  independent.  Equipment used at home: walker, rolling, bedside commode, bath bench.  DME owned (not currently used): none.  Upon discharge, patient will have assistance from family.    Objective:     Communicated with nurse prior to session.  Patient found supine with bed alarm, cryotherapy, SCD  upon PT entry to room.    General Precautions: Standard, fall   Orthopedic Precautions:LLE weight bearing as tolerated   Braces:       Exams:  · RLE ROM: WFL  · RLE Strength: Deficits: 4+/5 overall  · LLE ROM: Deficits: knee extension -5 degrees, flexion 85 degrees both taken in sitting  · LLE Strength: Deficits: 3-/5 overall    Functional Mobility:  · Bed Mobility:     · Supine to Sit: stand by assistance  · Transfers:     · Sit to Stand:  contact guard assistance with rolling walker  · Gait: x 250 feet with RW with CGA    Therapeutic Activities and Exercises:   gait training x 250 feet with RW with CGA using step thru gait pattern but with bilateral decreased step length    AM-PAC 6 CLICK MOBILITY  Total Score:18     Patient left up in chair with call button in reach, chair alarm on and nurse notified.    GOALS:   Multidisciplinary Problems     Physical Therapy Goals        Problem: Physical Therapy Goal    Goal Priority Disciplines Outcome Goal Variances Interventions   Physical Therapy Goal     PT, PT/OT Ongoing, Progressing     Description: Goals to be met by: 2020    Patient will increase functional independence with mobility by performin. Supine to sit with [place]  2. Sit to supine with [place]  3. Sit to stand transfer with [place]  4. Bed to chair transfer with [place] using Rolling Walker  5. Gait  x 250 feet with Supervision using Rolling Walker.                      History:     Past Medical History:   Diagnosis Date    Arthritis     Asthma     Bone spur of other site     Diabetes mellitus, type 2     Hyperlipemia     Hypertension     Knee pain        Past Surgical  History:   Procedure Laterality Date     SECTION      x2     hysterosocpy polyp removal      JOINT REPLACEMENT Right 2020    KNEE    KNEE ARTHROPLASTY Right 2020    Procedure: ARTHROPLASTY, KNEE;  Surgeon: Silver Bob MD;  Location: [facility];  Service: Orthopedics;  Laterality: Right;  Everett De La Garza. converted to general/LMA.    KNEE ARTHROSCOPY      TUBAL LIGATION      uterin ablastion         Time Tracking:     PT Received On: 20  PT Start Time: 1305     PT Stop Time: 1334  PT Total Time (min): 29 min     Billable Minutes: Evaluation 20 and Gait Training 9      Chris Megilligan, PT  2020

## 2020-11-09 NOTE — ASSESSMENT & PLAN NOTE
Patient's FSGs are controlled on current hypoglycemics.   Last A1c reviewed-   Lab Results   Component Value Date    HGBA1C 5.1 10/26/2020     Most recent fingerstick glucose reviewed- No results for input(s): POCTGLUCOSE in the last 24 hours.  Current correctional scale  Low  Maintain anti-hyperglycemic dose as follows-   Antihyperglycemics (From admission, onward)    None        Hold Oral hypoglycemics while patient is in the hospital.

## 2020-11-09 NOTE — ASSESSMENT & PLAN NOTE
Body mass index is 44.6 kg/m². Morbid obesity complicates all aspects of disease management from diagnostic modalities to treatment. Weight loss encouraged and health benefits explained to patient.

## 2020-11-09 NOTE — ANESTHESIA PREPROCEDURE EVALUATION
11/09/2020  Qiana Collins is a 45 y.o., female.    Anesthesia Evaluation    I have reviewed the Patient Summary Reports.    I have reviewed the Nursing Notes. I have reviewed the NPO Status.   I have reviewed the Medications.     Review of Systems  Anesthesia Hx:  No problems with previous Anesthesia    Social:  Former Smoker + marijuana use    Cardiovascular:   Hypertension hyperlipidemia    Pulmonary:   Asthma    Musculoskeletal:   Arthritis  Left knee osteoarthritis    Endocrine:   Diabetes, type 2        Physical Exam  General:  Morbid Obesity    Airway/Jaw/Neck:  Airway Findings: Mouth Opening: Normal Tongue: Normal  General Airway Assessment: Adult  Mallampati: II        Eyes/Ears/Nose:  EYES/EARS/NOSE FINDINGS: Normal   Dental:  DENTAL FINDINGS: Normal   Chest/Lungs:  Chest/Lungs Findings: Clear to auscultation, Normal Respiratory Rate     Heart/Vascular:  Heart Findings: Rate: Normal  Rhythm: Regular Rhythm        Mental Status:  Mental Status Findings:  Cooperative, Alert and Oriented         Anesthesia Plan  Type of Anesthesia, risks & benefits discussed:  Anesthesia Type:  spinal  Patient's Preference:   Intra-op Monitoring Plan: standard ASA monitors  Intra-op Monitoring Plan Comments:   Post Op Pain Control Plan: multimodal analgesia, peripheral nerve block and IV/PO Opioids PRN  Post Op Pain Control Plan Comments:   Induction:   IV  Beta Blocker:  Patient is not currently on a Beta-Blocker (No further documentation required).       Informed Consent: Patient understands risks and agrees with Anesthesia plan.  Questions answered. Anesthesia consent signed with patient.  ASA Score: 3     Day of Surgery Review of History & Physical: I have interviewed and examined the patient. I have reviewed the patient's H&P dated:    H&P update referred to the surgeon.         Ready For Surgery From  Anesthesia Perspective.

## 2020-11-09 NOTE — PLAN OF CARE
Problem: Physical Therapy Goal  Goal: Physical Therapy Goal  Description: Goals to be met by: 2020    Patient will increase functional independence with mobility by performin. Supine to sit with Horton  2. Sit to supine with Horton  3. Sit to stand transfer with Horton  4. Bed to chair transfer with Horton using Rolling Walker  5. Gait  x 250 feet with Supervision using Rolling Walker.     Outcome: Ongoing, Progressing

## 2020-11-09 NOTE — ASSESSMENT & PLAN NOTE
Chronic problem, controlled.  Patient is on Lasix at home will hold off and monitor for any changes, adjusting as needed.

## 2020-11-09 NOTE — RESPIRATORY THERAPY
11/09/20 1423   Patient Assessment/Suction   Level of Consciousness (AVPU) alert   Respiratory Effort Unlabored;Normal   PRE-TX-O2   O2 Device (Oxygen Therapy) room air   SpO2 98 %   Pulse Oximetry Type Intermittent   $ Pulse Oximetry - Multiple Charge Pulse Oximetry - Multiple   Pulse 65   Resp 16

## 2020-11-09 NOTE — PLAN OF CARE
VSS, denies pain, dressing to L knee cdi, cryo in place to L knee, tolerated clear liquids without N/V, IV fluids infusing, SCD to R leg and L foot on. OK per Dr. Vines to transfer the pt to the floor. Pt transported via bed to room 408. Pt's belongings at the bedside. Call light within reach. Report given to HUSSAIN Castelan.

## 2020-11-09 NOTE — HPI
Patient is a 45-year-old  female with past medical history significant for morbid obesity, osteoarthritis of the knee, type 2 diabetes mellitus, hypertension, hyperlipidemia patient is being seen postoperatively.  Patient admitted after total left knee arthroplasty by Dr. Silver geronimo on 11/09/2020.  Patient denies any chest pain, shortness of breath palpitation abdominal pain before the surgery.  Patient currently complains of some left knee pain otherwise denies any chest pain, shortness of breath, abdominal pain, nausea &vomiting.  Patient is tolerating food.

## 2020-11-09 NOTE — ANESTHESIA POSTPROCEDURE EVALUATION
Anesthesia Post Evaluation    Patient: Qiana Collins    Procedure(s) Performed: Procedure(s) (LRB):  ARTHROPLASTY, KNEE (Left)    Final Anesthesia Type: spinal    Patient location during evaluation: PACU  Patient participation: Yes- Able to Participate  Level of consciousness: awake and alert  Post-procedure vital signs: reviewed and stable  Pain management: adequate  Airway patency: patent    PONV status at discharge: No PONV  Anesthetic complications: no      Cardiovascular status: hemodynamically stable  Respiratory status: unassisted and room air  Hydration status: euvolemic  Follow-up not needed.          Vitals Value Taken Time   /63 11/09/20 1106   Temp 36.5 °C (97.7 °F) 11/09/20 1106   Pulse 63 11/09/20 1106   Resp 17 11/09/20 1106   SpO2 98 % 11/09/20 1106         Event Time   Out of Recovery 10:55:00         Pain/Brooke Score: Pain Rating Prior to Med Admin: 5 (11/9/2020  6:29 AM)  Brooke Score: 10 (11/9/2020 10:55 AM)

## 2020-11-09 NOTE — TRANSFER OF CARE
"Anesthesia Transfer of Care Note    Patient: Qiana Collins    Procedure(s) Performed: Procedure(s) (LRB):  ARTHROPLASTY, KNEE (Left)    Patient location: PACU    Anesthesia Type: general    Transport from OR: Transported from OR on room air with adequate spontaneous ventilation    Post pain: adequate analgesia    Post assessment: no apparent anesthetic complications and tolerated procedure well    Post vital signs: stable    Level of consciousness: awake, alert and oriented    Nausea/Vomiting: no nausea/vomiting    Complications: none    Transfer of care protocol was followed      Last vitals:   Visit Vitals  /69   Pulse 68   Temp 36.9 °C (98.4 °F) (Skin)   Resp 20   Ht 5' 9" (1.753 m)   Wt (!) 137 kg (302 lb)   SpO2 100%   Breastfeeding No   BMI 44.60 kg/m²     "

## 2020-11-09 NOTE — ANESTHESIA PROCEDURE NOTES
Peripheral Block    Patient location during procedure: pre-op   Block not for primary anesthetic.  Reason for block: at surgeon's request and post-op pain management   Post-op Pain Location: left knee   Start time: 11/9/2020 6:30 AM  Timeout: 11/9/2020 6:30 AM   End time: 11/9/2020 6:35 AM    Staffing  Authorizing Provider: Frank Vines MD  Performing Provider: Frank Vines MD    Preanesthetic Checklist  Completed: patient identified, site marked, surgical consent, pre-op evaluation, timeout performed, IV checked, risks and benefits discussed and monitors and equipment checked  Peripheral Block  Patient position: supine  Prep: ChloraPrep  Patient monitoring: heart rate, cardiac monitor, continuous pulse ox, continuous capnometry and frequent blood pressure checks  Block type: adductor canal  Laterality: left  Injection technique: single shot  Needle  Needle type: Stimuplex   Needle gauge: 21 G  Needle length: 4 in  Needle localization: anatomical landmarks and ultrasound guidance   -ultrasound image captured on disc.  Assessment  Injection assessment: negative aspiration, negative parasthesia and local visualized surrounding nerve  Paresthesia pain: none  Heart rate change: no  Slow fractionated injection: yes  Additional Notes  VSS.  [facility] RN monitoring vitals throughout procedure.  Patient tolerated procedure well.

## 2020-11-09 NOTE — ANESTHESIA PROCEDURE NOTES
Spinal    Diagnosis: osteoarthritis   Patient location during procedure: OR  Start time: 11/9/2020 7:15 AM  Timeout: 11/9/2020 7:15 AM  End time: 11/9/2020 7:21 AM    Staffing  Authorizing Provider: Frank Vines MD  Performing Provider: Frank Vines MD    Preanesthetic Checklist  Completed: patient identified, site marked, surgical consent, pre-op evaluation, timeout performed, IV checked, risks and benefits discussed and monitors and equipment checked  Spinal Block  Patient position: sitting  Prep: ChloraPrep  Patient monitoring: heart rate, cardiac monitor, continuous pulse ox, continuous capnometry and frequent blood pressure checks  Approach: midline  Location: L4-5  Injection technique: single shot  Needle  Needle type: pencil-tip   Needle gauge: 25 G  Needle length: 3.5 in  Additional Documentation: negative aspiration for heme, no paresthesia on injection and incremental injection  Needle localization: anatomical landmarks  Assessment  Sensory level: T10   Dermatomal levels determined by alcohol wipe  Ease of block: easy  Patient's tolerance of the procedure: comfortable throughout block and no complaints

## 2020-11-09 NOTE — NURSING
Pt arrived to unit via bed, pt awake and alert, skin warm and dry to touch, resp even and unlabored, verbally denies pain, cryo in place to left knee, SCDs applied, personal items at bedside, safety maintained call light and at bedside in reach will cont to monitor

## 2020-11-09 NOTE — SUBJECTIVE & OBJECTIVE
Past Medical History:   Diagnosis Date    Arthritis     Asthma     Bone spur of other site     Diabetes mellitus, type 2     Hyperlipemia     Hypertension     Knee pain        Past Surgical History:   Procedure Laterality Date     SECTION      x2     hysterosocpy polyp removal      JOINT REPLACEMENT Right 2020    KNEE    KNEE ARTHROPLASTY Right 2020    Procedure: ARTHROPLASTY, KNEE;  Surgeon: Silver Bob MD;  Location: [facility];  Service: Orthopedics;  Laterality: Right;  Everett De La Garza. converted to general/LMA.    KNEE ARTHROSCOPY      TUBAL LIGATION      uterin ablastion         Review of patient's allergies indicates:  No Known Allergies    No current facility-administered medications on file prior to encounter.      Current Outpatient Medications on File Prior to Encounter   Medication Sig    albuterol (PROVENTIL/VENTOLIN HFA) 90 mcg/actuation inhaler Rescue    benzoyl peroxide (BENZAC AC) 10 % external wash WASH back nightly as tolerated (Patient taking differently: WASH FACE nightly as tolerated)    FLOVENT  mcg/actuation inhaler Inhale 2 puffs into the lungs 2 (two) times daily.    potassium chloride (MICRO-K) 10 MEQ CpSR Take 1 capsule (10 mEq total) by mouth once daily.    blood sugar diagnostic (SKYLER BLOOD GLUCOSE TEST STRIP) Strp 1 strip by Misc.(Non-Drug; Combo Route) route once daily.    clotrimazole-betamethasone 1-0.05% (LOTRISONE) cream APPLY topically TWICE DAILY (Patient taking differently: as needed. APPLY topically TWICE DAILY)    furosemide (LASIX) 40 MG tablet Take 1 tablet (40 mg total) by mouth daily as needed (swelling).    lancets 33 gauge Misc 1 lancet by Misc.(Non-Drug; Combo Route) route once daily.     Family History     Problem Relation (Age of Onset)    Dementia Mother    Diabetes Mother, Father    Heart disease Father        Tobacco Use    Smoking status: Former Smoker     Packs/day: 0.50     Types: Cigarettes    Smokeless tobacco:  Never Used   Substance and Sexual Activity    Alcohol use: No    Drug use: Yes     Types: Marijuana     Comment: socially/pain    Sexual activity: Yes     Partners: Male     Birth control/protection: None     Review of Systems   Constitutional: Negative for appetite change, fatigue and fever.   HENT: Negative for congestion and sore throat.    Eyes: Negative for discharge and visual disturbance.   Respiratory: Negative for cough, chest tightness, shortness of breath and wheezing.    Cardiovascular: Negative for chest pain, palpitations and leg swelling.   Gastrointestinal: Negative for abdominal distention, abdominal pain, constipation, diarrhea and nausea.   Endocrine: Negative for polyphagia and polyuria.   Genitourinary: Negative for dysuria, hematuria and urgency.   Musculoskeletal: Positive for arthralgias, gait problem and joint swelling.   Allergic/Immunologic: Negative for immunocompromised state.   Neurological: Negative for dizziness and weakness.   Hematological: Does not bruise/bleed easily.   Psychiatric/Behavioral: Negative for agitation. The patient is not nervous/anxious.      Objective:     Vital Signs (Most Recent):  Temp: 97.7 °F (36.5 °C) (11/09/20 1106)  Pulse: 63 (11/09/20 1106)  Resp: 17 (11/09/20 1106)  BP: (!) 149/63 (11/09/20 1106)  SpO2: 98 % (11/09/20 1106) Vital Signs (24h Range):  Temp:  [97.7 °F (36.5 °C)-98.4 °F (36.9 °C)] 97.7 °F (36.5 °C)  Pulse:  [63-80] 63  Resp:  [12-20] 17  SpO2:  [94 %-100 %] 98 %  BP: ()/(54-90) 149/63     Weight: (!) 137 kg (302 lb)  Body mass index is 44.6 kg/m².    Physical Exam  Constitutional:       Appearance: Normal appearance.   HENT:      Head: Normocephalic and atraumatic.      Right Ear: External ear normal.      Left Ear: External ear normal.      Mouth/Throat:      Mouth: Mucous membranes are moist.   Eyes:      Extraocular Movements: Extraocular movements intact.      Pupils: Pupils are equal, round, and reactive to light.   Neck:       Musculoskeletal: Normal range of motion and neck supple.   Cardiovascular:      Rate and Rhythm: Normal rate and regular rhythm.      Pulses: Normal pulses.      Heart sounds: Normal heart sounds.   Pulmonary:      Effort: Pulmonary effort is normal.      Breath sounds: Normal breath sounds.   Abdominal:      General: Bowel sounds are normal.      Palpations: Abdomen is soft.   Musculoskeletal:         General: Tenderness present.      Comments: Left knee dressing intact   Skin:     General: Skin is warm.      Capillary Refill: Capillary refill takes 2 to 3 seconds.   Neurological:      General: No focal deficit present.      Mental Status: She is alert and oriented to person, place, and time.           CRANIAL NERVES     CN III, IV, VI   Pupils are equal, round, and reactive to light.       Significant Labs: BMP: No results for input(s): GLU, NA, K, CL, CO2, BUN, CREATININE, CALCIUM, MG in the last 48 hours.  CBC: No results for input(s): WBC, HGB, HCT, PLT in the last 48 hours.  CMP: No results for input(s): NA, K, CL, CO2, GLU, BUN, CREATININE, CALCIUM, PROT, ALBUMIN, BILITOT, ALKPHOS, AST, ALT, ANIONGAP, EGFRNONAA in the last 48 hours.    Invalid input(s): ESTGFA[place]    Significant Imaging: I have reviewed all pertinent imaging results/findings within the past 24 hours.

## 2020-11-09 NOTE — H&P
CC/Indication for Procedure: 45 y.o. female with Primary osteoarthritis of left knee [M17.12]  Preop testing [Z01.818]  Osteoarthritis of left knee [M17.12].    Patient scheduled for GA TOTAL KNEE ARTHROPLASTY [[id number]] (ARTHROPLASTY, KNEE).    Past Medical History:   Diagnosis Date    Arthritis     Asthma     Bone spur of other site     Diabetes mellitus, type 2     Hyperlipemia     Hypertension     Knee pain      Past Surgical History:   Procedure Laterality Date     SECTION      x2     hysterosocpy polyp removal      JOINT REPLACEMENT Right 2020    KNEE    KNEE ARTHROPLASTY Right 2020    Procedure: ARTHROPLASTY, KNEE;  Surgeon: Silver Bob MD;  Location: [facility];  Service: Orthopedics;  Laterality: Right;  Everett De La Garza. converted to general/LMA.    KNEE ARTHROSCOPY      TUBAL LIGATION      uterin ablastion       Family History   Problem Relation Age of Onset    Diabetes Mother     Dementia Mother     Diabetes Father     Heart disease Father      Social History     Socioeconomic History    Marital status: Single     Spouse name: Not on file    Number of children: Not on file    Years of education: Not on file    Highest education level: Not on file   Occupational History    Not on file   Social Needs    Financial resource strain: Not on file    Food insecurity     Worry: Not on file     Inability: Not on file    Transportation needs     Medical: Not on file     Non-medical: Not on file   Tobacco Use    Smoking status: Former Smoker     Packs/day: 0.50     Types: Cigarettes    Smokeless tobacco: Never Used   Substance and Sexual Activity    Alcohol use: No    Drug use: Yes     Types: Marijuana     Comment: socially/pain    Sexual activity: Yes     Partners: Male     Birth control/protection: None   Lifestyle    Physical activity     Days per week: Not on file     Minutes per session: Not on file    Stress: Not on file   Relationships    Social connections      Talks on phone: Not on file     Gets together: Not on file     Attends [employer] service: Not on file     Active member of club or organization: Not on file     Attends meetings of clubs or organizations: Not on file     Relationship status: Not on file   Other Topics Concern    Not on file   Social History Narrative    Not on file       Review of patient's allergies indicates:  No Known Allergies      Current Facility-Administered Medications:     cefazolin (ANCEF) 2 gram in dextrose 5% 50 mL IVPB (premix), 2 g, Intravenous, On Call Procedure, Silver Bob MD    electrolyte-S (ISOLYTE), , Intravenous, Continuous, Jay Jay Hook MD, Last Rate: 10 mL/hr at 11/09/20 0600    fentaNYL injection 25 mcg, 25 mcg, Intravenous, Q5 Min PRN, Jay Jay Hook MD    lidocaine (PF) 10 mg/ml (1%) injection 10 mg, 1 mL, Intradermal, Once, Jay Jay Hook MD    midazolam (VERSED) 1 mg/mL injection 0.5 mg, 0.5 mg, Intravenous, PRN, Jay Jay Hook MD    mupirocin 2 % ointment, , Nasal, On Call Procedure, Silver Bob MD    pregabalin capsule 75 mg, 75 mg, Oral, Once Pre-Op, Jay Jay Hook MD    sodium chloride 0.9% 49.3 mL with ropivacaine 0.5% (PF) (NAROPIN) 49.3 mL, ketorolac (TORADOL) 30 mg, EPINEPHrine (ADRENALINE) 0.5 mg injection, , Other, Once, Silver Bob MD    sodium chloride 0.9% flush 3 mL, 3 mL, Intravenous, Q8H, Jay Jay Hook MD    tranexamic acid (CYKLOKAPRON) 1,000 mg in sodium chloride 0.9 % 100 mL (ready to mix system), 1,000 mg, Intravenous, On Call Procedure, Silver Bob MD    Facility-Administered Medications Ordered in Other Encounters:     fentaNYL injection, , Intravenous, PRN, Javad Knight RN, 100 mcg at 11/09/20 0640    midazolam injection, , Intravenous, PRN, Javad Knight RN, 2 mg at 11/09/20 0640    ROS:    Denies chest pain or palpitations  Denies shortness of breath  Denies fevers or chills  Denies chest pain  Denies abdominal  pain    PE:    General Appearance: Well nourished  Orientation: Oriented to time, place, person  Mental Status: Alert  Heart: RRR  Lungs: CTA  Abdomen: Soft and non-tender    Anesthesia/Surgery risks, benefits and alternative options discussed and understood by patient/family.    This note was created using Dragon voice recognition software that occasionally misinterpreted phrases or words.

## 2020-11-09 NOTE — OP NOTE
[facility]  Surgery Department  Operative Note    SUMMARY     Date of Procedure: 11/9/2020     Procedure:  Left total knee arthroplasty    Surgeon(s) and Role:     * Silver Bob MD - Primary    Assisting Surgeon:  Sue     Pre-Operative Diagnosis: Primary osteoarthritis of left knee [M17.12]  Preop testing [Z01.818]    Post-Operative Diagnosis: Post-Op Diagnosis Codes:     * Primary osteoarthritis of left knee [M17.12]     * Preop testing [Z01.818]    Anesthesia: Spinal    Intraoperative Findings:  Bone-on-bone arthritis left knee    Description of the Findings of the Procedure:  Patient was placed on the operating table in supine position.  The left knee was prepped and draped in the usual sterile manner for surgery.  An anterior approach was undertaken to the knee and carried down sharply through the skin.  The medial parapatellar follow-up issues were visualized and release distally in the knee was flexed to 90°.  A drill was used to gain access to the femur.  A cutting jig was pinned into position such that would make a 5 degree valgus cut intake 9 mm of distal bone.  The cut was made without difficulty.  We now placed a femoral Sizer into position.  She sized to a size 5.  Size 5 cutting jig was pinned into position in the cuts were made.  We placed a femoral trial.  It fit perfectly it was extracted.  We now subluxed the tibia anteriorly.  A cutting jig was pinned into position such that would make a neutral cut intake 2 mm of medial bone.  It was checked secondarily in the cuts were made.  We placed a femoral trial and tibial trial.  The construct had full extension full flexion and symmetric gaps.  We broached the tibia.  The patella was calibrated and cut the button was placed.  The construct trialed perfectly.  We pulsed and irrigated.  We mixed up bone cement and cemented 1st the tibia next the femur and finally the patella.  All cement was removed at the time of see mentation in  the construct was held in full extension until all the cement hardened.  We copiously irrigated again.  We tapped her actual spacer into position.  We closed the extensor mechanism with a combination of 2.  FiberWire in a running Quill stitch.  We irrigated and closed the subcu with 2 0 Vicryl.  The skin was closed with 4-0 Monocryl.  Sterile dressings were applied and the patient was noted to be in stable condition    Complications: No    Estimated Blood Loss (EBL): * No values recorded between 11/9/2020  7:51 AM and 11/9/2020  8:25 AM *           Implants:   Implant Name Type Inv. Item Serial No.  Lot No. LRB No. Used Action   CEMENT BONE ORTHOSET 1 40 GRAM - [id number]  CEMENT BONE ORTHOSET 1 40 GRAM  [employer] [id number] Left 2 Implanted   COMP FEM EDGARD EVOLUTION SZ5 L - [id number]  COMP FEM EDGARD EVOLUTION SZ5 L  [employer] [id number] Left 1 Implanted   PATELLA ONLAY 35MM TRI PEG - [id number]  PATELLA ONLAY 35MM TRI PEG  [employer] [id number] Left 1 Implanted   TIBIAL EVOLUTION SZ 5 LEFT - [id number]  TIBIAL EVOLUTION SZ 5 LEFT  [employer] [id number] Left 1 Implanted       Specimens:   Specimen (12h ago, onward)    None                  Condition: Good    Disposition: PACU - hemodynamically stable.

## 2020-11-09 NOTE — PLAN OF CARE
Preop complete. No family present at this time. Text notifications initiated with father. Pt denies need for safe, states that she didn't bring any valuables. Belongings bag labeled and brought to post op pt cab.

## 2020-11-09 NOTE — H&P
[facility] Medicine  History & Physical    Patient Name: Qiana Collins  MRN: [id number]  Admission Date: 2020  Attending Physician: Osiris Lan MD  Primary Care Provider: Darien Odonnell MD         Patient information was obtained from patient and past medical records.     Subjective:     Principal Problem:Osteoarthritis of left knee    Chief Complaint: No chief complaint on file.       HPI: Patient is a 45-year-old  female with past medical history significant for morbid obesity, osteoarthritis of the knee, type 2 diabetes mellitus, hypertension, hyperlipidemia patient is being seen postoperatively.  Patient admitted after total left knee arthroplasty by Dr. Silver bob on 2020.  Patient denies any chest pain, shortness of breath palpitation abdominal pain before the surgery.  Patient currently complains of some left knee pain otherwise denies any chest pain, shortness of breath, abdominal pain, nausea &vomiting.  Patient is tolerating food.    Past Medical History:   Diagnosis Date    Arthritis     Asthma     Bone spur of other site     Diabetes mellitus, type 2     Hyperlipemia     Hypertension     Knee pain        Past Surgical History:   Procedure Laterality Date     SECTION      x2     hysterosocpy polyp removal      JOINT REPLACEMENT Right 2020    KNEE    KNEE ARTHROPLASTY Right 2020    Procedure: ARTHROPLASTY, KNEE;  Surgeon: Silver Bob MD;  Location: On license of [facility];  Service: Orthopedics;  Laterality: Right;  Everett De La Garza. converted to general/LMA.    KNEE ARTHROSCOPY      TUBAL LIGATION      uterin ablastion         Review of patient's allergies indicates:  No Known Allergies    No current facility-administered medications on file prior to encounter.      Current Outpatient Medications on File Prior to Encounter   Medication Sig    albuterol (PROVENTIL/VENTOLIN HFA) 90 mcg/actuation inhaler Rescue    benzoyl peroxide  (BENZAC AC) 10 % external wash WASH back nightly as tolerated (Patient taking differently: WASH FACE nightly as tolerated)    FLOVENT  mcg/actuation inhaler Inhale 2 puffs into the lungs 2 (two) times daily.    potassium chloride (MICRO-K) 10 MEQ CpSR Take 1 capsule (10 mEq total) by mouth once daily.    blood sugar diagnostic (SKYLER BLOOD GLUCOSE TEST STRIP) Strp 1 strip by Misc.(Non-Drug; Combo Route) route once daily.    clotrimazole-betamethasone 1-0.05% (LOTRISONE) cream APPLY topically TWICE DAILY (Patient taking differently: as needed. APPLY topically TWICE DAILY)    furosemide (LASIX) 40 MG tablet Take 1 tablet (40 mg total) by mouth daily as needed (swelling).    lancets 33 gauge Misc 1 lancet by Misc.(Non-Drug; Combo Route) route once daily.     Family History     Problem Relation (Age of Onset)    Dementia Mother    Diabetes Mother, Father    Heart disease Father        Tobacco Use    Smoking status: Former Smoker     Packs/day: 0.50     Types: Cigarettes    Smokeless tobacco: Never Used   Substance and Sexual Activity    Alcohol use: No    Drug use: Yes     Types: Marijuana     Comment: socially/pain    Sexual activity: Yes     Partners: Male     Birth control/protection: None     Review of Systems   Constitutional: Negative for appetite change, fatigue and fever.   HENT: Negative for congestion and sore throat.    Eyes: Negative for discharge and visual disturbance.   Respiratory: Negative for cough, chest tightness, shortness of breath and wheezing.    Cardiovascular: Negative for chest pain, palpitations and leg swelling.   Gastrointestinal: Negative for abdominal distention, abdominal pain, constipation, diarrhea and nausea.   Endocrine: Negative for polyphagia and polyuria.   Genitourinary: Negative for dysuria, hematuria and urgency.   Musculoskeletal: Positive for arthralgias, gait problem and joint swelling.   Allergic/Immunologic: Negative for immunocompromised state.    Neurological: Negative for dizziness and weakness.   Hematological: Does not bruise/bleed easily.   Psychiatric/Behavioral: Negative for agitation. The patient is not nervous/anxious.      Objective:     Vital Signs (Most Recent):  Temp: 97.7 °F (36.5 °C) (11/09/20 1106)  Pulse: 63 (11/09/20 1106)  Resp: 17 (11/09/20 1106)  BP: (!) 149/63 (11/09/20 1106)  SpO2: 98 % (11/09/20 1106) Vital Signs (24h Range):  Temp:  [97.7 °F (36.5 °C)-98.4 °F (36.9 °C)] 97.7 °F (36.5 °C)  Pulse:  [63-80] 63  Resp:  [12-20] 17  SpO2:  [94 %-100 %] 98 %  BP: ()/(54-90) 149/63     Weight: (!) 137 kg (302 lb)  Body mass index is 44.6 kg/m².    Physical Exam  Constitutional:       Appearance: Normal appearance.   HENT:      Head: Normocephalic and atraumatic.      Right Ear: External ear normal.      Left Ear: External ear normal.      Mouth/Throat:      Mouth: Mucous membranes are moist.   Eyes:      Extraocular Movements: Extraocular movements intact.      Pupils: Pupils are equal, round, and reactive to light.   Neck:      Musculoskeletal: Normal range of motion and neck supple.   Cardiovascular:      Rate and Rhythm: Normal rate and regular rhythm.      Pulses: Normal pulses.      Heart sounds: Normal heart sounds.   Pulmonary:      Effort: Pulmonary effort is normal.      Breath sounds: Normal breath sounds.   Abdominal:      General: Bowel sounds are normal.      Palpations: Abdomen is soft.   Musculoskeletal:         General: Tenderness present.      Comments: Left knee dressing intact   Skin:     General: Skin is warm.      Capillary Refill: Capillary refill takes 2 to 3 seconds.   Neurological:      General: No focal deficit present.      Mental Status: She is alert and oriented to person, place, and time.           CRANIAL NERVES     CN III, IV, VI   Pupils are equal, round, and reactive to light.       Significant Labs: BMP: No results for input(s): GLU, NA, K, CL, CO2, BUN, CREATININE, CALCIUM, MG in the last 48  hours.  CBC: No results for input(s): WBC, HGB, HCT, PLT in the last 48 hours.  CMP: No results for input(s): NA, K, CL, CO2, GLU, BUN, CREATININE, CALCIUM, PROT, ALBUMIN, BILITOT, ALKPHOS, AST, ALT, ANIONGAP, EGFRNONAA in the last 48 hours.    Invalid input(s): ESTGF[place]    Significant Imaging: I have reviewed all pertinent imaging results/findings within the past 24 hours.    Assessment/Plan:     * Osteoarthritis of left knee  Will follow postop surgical recommendations.  Encourage intensive spirometry use  Gentle IV fluid hydration  Will continue follow surgery recommendations  As needed antiemetics, pain medication  DVT prophylaxis once cleared by orthopedic  A.m. labs  Physical therapy consult      Morbid obesity  Body mass index is 44.6 kg/m². Morbid obesity complicates all aspects of disease management from diagnostic modalities to treatment. Weight loss encouraged and health benefits explained to patient.          Controlled type 2 diabetes mellitus  Patient's FSGs are controlled on current hypoglycemics.   Last A1c reviewed-   Lab Results   Component Value Date    HGBA1C 5.1 10/26/2020     Most recent fingerstick glucose reviewed- No results for input(s): POCTGLUCOSE in the last 24 hours.  Current correctional scale  Low  Maintain anti-hyperglycemic dose as follows-   Antihyperglycemics (From admission, onward)    None        Hold Oral hypoglycemics while patient is in the hospital.        Essential hypertension  Chronic problem, controlled.  Patient is on Lasix at home will hold off and monitor for any changes, adjusting as needed.           VTE Risk Mitigation (From admission, onward)         Ordered     IP VTE LOW RISK PATIENT  Once      11/09/20 1128     Place sequential compression device  Until discontinued      11/09/20 1128                   Osiris Lan MD  Department of Hospital Medicine   [facility]

## 2020-11-09 NOTE — ASSESSMENT & PLAN NOTE
Will follow postop surgical recommendations.  Encourage intensive spirometry use  Gentle IV fluid hydration  Will continue follow surgery recommendations  As needed antiemetics, pain medication  DVT prophylaxis once cleared by orthopedic  A.m. labs  Physical therapy consult

## 2020-11-10 DIAGNOSIS — Z96.652 STATUS POST TOTAL KNEE REPLACEMENT, LEFT: Primary | ICD-10-CM

## 2020-11-10 LAB
BASOPHILS # BLD AUTO: 0.03 K/UL (ref 0–0.2)
BASOPHILS NFR BLD: 0.3 % (ref 0–1.9)
DIFFERENTIAL METHOD: ABNORMAL
EOSINOPHIL # BLD AUTO: 0.4 K/UL (ref 0–0.5)
EOSINOPHIL NFR BLD: 3.7 % (ref 0–8)
ERYTHROCYTE [DISTWIDTH] IN BLOOD BY AUTOMATED COUNT: 14.4 % (ref 11.5–14.5)
HCT VFR BLD AUTO: 42.1 % (ref 37–48.5)
HGB BLD-MCNC: 13.4 G/DL (ref 12–16)
IMM GRANULOCYTES # BLD AUTO: 0.14 K/UL (ref 0–0.04)
IMM GRANULOCYTES NFR BLD AUTO: 1.4 % (ref 0–0.5)
LYMPHOCYTES # BLD AUTO: 1.6 K/UL (ref 1–4.8)
LYMPHOCYTES NFR BLD: 15.9 % (ref 18–48)
MCH RBC QN AUTO: 29.8 PG (ref 27–31)
MCHC RBC AUTO-ENTMCNC: 31.8 G/DL (ref 32–36)
MCV RBC AUTO: 94 FL (ref 82–98)
MONOCYTES # BLD AUTO: 1.1 K/UL (ref 0.3–1)
MONOCYTES NFR BLD: 11 % (ref 4–15)
NEUTROPHILS # BLD AUTO: 6.7 K/UL (ref 1.8–7.7)
NEUTROPHILS NFR BLD: 67.7 % (ref 38–73)
NRBC BLD-RTO: 1 /100 WBC
PLATELET # BLD AUTO: 158 K/UL (ref 150–350)
PMV BLD AUTO: 11.5 FL (ref 9.2–12.9)
RBC # BLD AUTO: 4.49 M/UL (ref 4–5.4)
WBC # BLD AUTO: 9.84 K/UL (ref 3.9–12.7)

## 2020-11-10 PROCEDURE — 63600175 PHARM REV CODE 636 W HCPCS: Performed by: ORTHOPAEDIC SURGERY

## 2020-11-10 PROCEDURE — 97165 OT EVAL LOW COMPLEX 30 MIN: CPT

## 2020-11-10 PROCEDURE — 36415 COLL VENOUS BLD VENIPUNCTURE: CPT

## 2020-11-10 PROCEDURE — 25000003 PHARM REV CODE 250: Performed by: ORTHOPAEDIC SURGERY

## 2020-11-10 PROCEDURE — 94761 N-INVAS EAR/PLS OXIMETRY MLT: CPT

## 2020-11-10 PROCEDURE — 97116 GAIT TRAINING THERAPY: CPT

## 2020-11-10 PROCEDURE — 25000003 PHARM REV CODE 250: Performed by: PHYSICIAN ASSISTANT

## 2020-11-10 PROCEDURE — 25000003 PHARM REV CODE 250: Performed by: ANESTHESIOLOGY

## 2020-11-10 PROCEDURE — 85025 COMPLETE CBC W/AUTO DIFF WBC: CPT

## 2020-11-10 PROCEDURE — 25000003 PHARM REV CODE 250: Performed by: INTERNAL MEDICINE

## 2020-11-10 PROCEDURE — 97110 THERAPEUTIC EXERCISES: CPT

## 2020-11-10 RX ORDER — OXYCODONE HYDROCHLORIDE 15 MG/1
15 TABLET ORAL EVERY 4 HOURS PRN
Qty: 42 TABLET | Refills: 0 | Status: SHIPPED | OUTPATIENT
Start: 2020-11-10 | End: 2020-11-16

## 2020-11-10 RX ADMIN — FAMOTIDINE 20 MG: 20 TABLET ORAL at 08:11

## 2020-11-10 RX ADMIN — DEXTROSE 2 G: 50 INJECTION, SOLUTION INTRAVENOUS at 12:11

## 2020-11-10 RX ADMIN — OXYCODONE HYDROCHLORIDE 10 MG: 10 TABLET ORAL at 06:11

## 2020-11-10 RX ADMIN — ASPIRIN 81 MG: 81 TABLET, CHEWABLE ORAL at 08:11

## 2020-11-10 RX ADMIN — OXYCODONE HYDROCHLORIDE 15 MG: 10 TABLET ORAL at 10:11

## 2020-11-10 RX ADMIN — PREGABALIN 75 MG: 75 CAPSULE ORAL at 08:11

## 2020-11-10 RX ADMIN — ALUMINUM HYDROXIDE, MAGNESIUM HYDROXIDE, AND SIMETHICONE 30 ML: 200; 200; 20 SUSPENSION ORAL at 06:11

## 2020-11-10 RX ADMIN — POLYETHYLENE GLYCOL 3350 17 G: 17 POWDER, FOR SOLUTION ORAL at 08:11

## 2020-11-10 RX ADMIN — CELECOXIB 100 MG: 100 CAPSULE ORAL at 08:11

## 2020-11-10 RX ADMIN — MORPHINE SULFATE 2 MG: 2 INJECTION, SOLUTION INTRAMUSCULAR; INTRAVENOUS at 06:11

## 2020-11-10 RX ADMIN — OXYCODONE HYDROCHLORIDE 10 MG: 10 TABLET, FILM COATED, EXTENDED RELEASE ORAL at 08:11

## 2020-11-10 RX ADMIN — OXYCODONE HYDROCHLORIDE 10 MG: 10 TABLET ORAL at 12:11

## 2020-11-10 RX ADMIN — ALUMINUM HYDROXIDE, MAGNESIUM HYDROXIDE, AND SIMETHICONE 30 ML: 200; 200; 20 SUSPENSION ORAL at 10:11

## 2020-11-10 NOTE — PT/OT/SLP PROGRESS
Physical Therapy Treatment    Patient Name:  Qiana Collins   MRN:  [id number]    Recommendations:     Discharge Recommendations:  outpatient PT   Discharge Equipment Recommendations: none   Barriers to discharge: None    Assessment:     Qiana Collins is a 45 y.o. female admitted with a medical diagnosis of Osteoarthritis of left knee.  She presents with the following impairments/functional limitations:  weakness, impaired endurance, impaired functional mobilty, gait instability, impaired balance, decreased lower extremity function, decreased ROM, edema, orthopedic precautions, pain .  Patient readily agreeable to PT treatment this morning and indicated she was ready to go home.  Patient presented supine in bed and was able to transfer to sitting with SBA and stood with SBA.  Patient then ambulated x 250 feet with RW with SBA with step thru gait pattern but with decreased step length bilateral LE.    Rehab Prognosis: Good; patient would benefit from acute skilled PT services to address these deficits and reach maximum level of function.    Recent Surgery: Procedure(s) (LRB):  ARTHROPLASTY, KNEE (Left) 1 Day Post-Op    Plan:     During this hospitalization, patient to be seen BID to address the identified rehab impairments via gait training, therapeutic activities, therapeutic exercises and progress toward the following goals:    · Plan of Care Expires:  12/07/20    Subjective     Chief Complaint: pain  Patient/Family Comments/goals: go home  Pain/Comfort:  Pain Rating 1: 4/10  Location - Side 1: Left  Location - Orientation 1: lower  Location 1: knee  Pain Addressed 1: Reposition, Cessation of Activity  Pain Rating Post-Intervention 1: 6/10(pain increased with activity)      Objective:     Communicated with nurse prior to session.  Patient found supine with cryotherapy, bed alarm, peripheral IV upon PT entry to room.     General Precautions: Standard, fall   Orthopedic Precautions:LLE weight bearing as  tolerated   Braces:       Functional Mobility:  · Bed Mobility:     · Supine to Sit: stand by assistance  · Transfers:     · Sit to Stand:  stand by assistance with rolling walker  · Toilet Transfer: stand by assistance with  rolling walker  using  Step Transfer  · Gait: x 250 feet with RW with SBA      AM-PAC 6 CLICK MOBILITY          Therapeutic Activities and Exercises:   exercise to include LAQ, toe raises, hip flexion, isometric hip abd and isometrics hip add.  All done in seated left LE x 10 reps with 3 second hold on isometrics.  Gait training x 15 feet, x 250 feet with RW with SBA using slow step to gait pattern with decreased step length bilateral LE    Patient left up in chair with call button in reach, chair alarm on and nurse notified..    GOALS:   Multidisciplinary Problems     Physical Therapy Goals        Problem: Physical Therapy Goal    Goal Priority Disciplines Outcome Goal Variances Interventions   Physical Therapy Goal     PT, PT/OT Ongoing, Progressing     Description: Goals to be met by: 2020    Patient will increase functional independence with mobility by performin. Supine to sit with [place]  2. Sit to supine with [place]  3. Sit to stand transfer with [place]  4. Bed to chair transfer with [place] using Rolling Walker  5. Gait  x 250 feet with Supervision using Rolling Walker.                      Time Tracking:     PT Received On: 11/10/20  PT Start Time: 705     PT Stop Time: 729  PT Total Time (min): 24 min     Billable Minutes: Gait Training 12 and Therapeutic Exercise 12    Treatment Type: Treatment  PT/PTA: PT     PTA Visit Number: 0     Chris Megilligan, PT  11/10/2020

## 2020-11-10 NOTE — PROGRESS NOTES
"[facility]  Orthopedics  Progress Note    Patient Name: Qiana Collins  MRN: [id number]  Admission Date: 11/9/2020  Hospital Length of Stay: 0 days  Attending Provider: Silver Bob MD  Primary Care Provider: Darien Odonnell MD  Follow-up For: Procedure(s) (LRB):  ARTHROPLASTY, KNEE (Left)    Post-Operative Day: 1 Day Post-Op  Subjective:     Principal Problem:Osteoarthritis of left knee    Principal Orthopedic Problem: S/P L TKA    Interval History: none    Review of patient's allergies indicates:  No Known Allergies    Current Facility-Administered Medications   Medication    aluminum-magnesium hydroxide-simethicone 200-200-20 mg/5 mL suspension 30 mL    aspirin chewable tablet 81 mg    bisacodyL suppository 10 mg    celecoxib capsule 100 mg    famotidine tablet 20 mg    morphine injection 2 mg    ondansetron disintegrating tablet 8 mg    oxyCODONE 12 hr tablet 10 mg    oxyCODONE immediate release tablet 5 mg    oxyCODONE immediate release tablet Tab 10 mg    polyethylene glycol packet 17 g    pregabalin capsule 75 mg    sodium chloride 0.9% flush 5 mL    zolpidem tablet 5 mg     Objective:     Vital Signs (Most Recent):  Temp: 97.9 °F (36.6 °C) (11/10/20 0334)  Pulse: 66 (11/10/20 0334)  Resp: 20 (11/10/20 0626)  BP: 133/70 (11/10/20 0334)  SpO2: 95 % (11/10/20 0334) Vital Signs (24h Range):  Temp:  [97.1 °F (36.2 °C)-98.2 °F (36.8 °C)] 97.9 °F (36.6 °C)  Pulse:  [63-80] 66  Resp:  [12-20] 20  SpO2:  [91 %-100 %] 95 %  BP: ()/(54-80) 133/70     Weight: (!) 137 kg (302 lb)  Height: 5' 9" (175.3 cm)  Body mass index is 44.6 kg/m².      Intake/Output Summary (Last 24 hours) at 11/10/2020 0651  Last data filed at 11/9/2020 1600  Gross per 24 hour   Intake 3086.25 ml   Output --   Net 3086.25 ml       General    Nursing note and vitals reviewed.  Constitutional: She is oriented to person, place, and time.   Obese   Pulmonary/Chest: Effort normal.   Neurological: She is alert and " oriented to person, place, and time.   Psychiatric: She has a normal mood and affect. Her behavior is normal.             Left Knee Exam     Comments:  LLE DNVI. Dressing C/D/I      Significant Labs:   CBC:   Recent Labs   Lab 11/10/20  0606   WBC 9.84   HGB 13.4   HCT 42.1        CMP: No results for input(s): NA, K, CL, CO2, GLU, BUN, CREATININE, CALCIUM, PROT, ALBUMIN, BILITOT, ALKPHOS, AST, ALT, ANIONGAP, EGFRNONAA in the last 48 hours.    Invalid input(s): [place]  All pertinent labs within the past 24 hours have been reviewed.    Significant Imaging: X-Ray: I have reviewed all pertinent results/findings and my personal findings are:  The patient has undergone a left knee joint replacement with metallic femoral and tibial components in good position.  No fractures are seen.    Assessment/Plan:     * Osteoarthritis of left knee  OOB with PT and nursing  Change dressing today  DC home with           JODY ESTRADA  Orthopedics  [facility]

## 2020-11-10 NOTE — PLAN OF CARE
Patient alert and oriented, pleasant and cooperative with care. Surgical dressing CDI. Neuro checks good. Cryo in place. Pain managed with PRN oxycodone 10mg q6h. Standby assist to bathroom. Bed low, call bell in reach, bed alarm on.         Problem: Adult Inpatient Plan of Care  Goal: Plan of Care Review  Outcome: Ongoing, Progressing  Flowsheets (Taken 11/10/2020 0101)  Plan of Care Reviewed With: patient  Goal: Patient-Specific Goal (Individualization)  Outcome: Ongoing, Progressing  Flowsheets (Taken 11/10/2020 0101)  Individualized Care Needs: IV fluids , pain control     Problem: Fall Injury Risk  Goal: Absence of Fall and Fall-Related Injury  Outcome: Ongoing, Progressing  Intervention: Identify and Manage Contributors to Fall Injury Risk  Flowsheets (Taken 11/10/2020 0101)  Self-Care Promotion:   independence encouraged   BADL personal objects within reach  Medication Review/Management: medications reviewed  Intervention: Promote Injury-Free Environment  Flowsheets (Taken 11/10/2020 0101)  Safety Promotion/Fall Prevention:   assistive device/personal item within reach   bed alarm set  Environmental Safety Modification:   assistive device/personal items within reach   clutter free environment maintained     Problem: Infection  Goal: Infection Symptom Resolution  Outcome: Ongoing, Progressing  Intervention: Prevent or Manage Infection  Flowsheets (Taken 11/10/2020 0101)  Fever Reduction/Comfort Measures: lightweight bedding  Infection Management: aseptic technique maintained

## 2020-11-10 NOTE — PLAN OF CARE
Pt clear for discharge from case management standpoint. Pt discharging to home with outpt therapy.        11/10/20 1152   Final Note   Assessment Type Final Discharge Note   Anticipated Discharge Disposition Home   Hospital Follow Up  Appt(s) scheduled? Yes

## 2020-11-10 NOTE — NURSING
"Attempted to discharge pt and she states " I am not comfortable leaving on the pain medicine " Notified Delicia NIXON charge nurse and she will address this.     Discharge instructions given to pt. Instructed on medicine regimen and f/u appts. IV  removed.   "

## 2020-11-10 NOTE — RESPIRATORY THERAPY
11/10/20 0725   Patient Assessment/Suction   Level of Consciousness (AVPU) alert   SHIRLEY Breath Sounds clear   PRE-TX-O2   O2 Device (Oxygen Therapy) room air   SpO2 97 %   Pulse Oximetry Type Intermittent   $ Pulse Oximetry - Multiple Charge Pulse Oximetry - Multiple   Pulse 80   Resp 18

## 2020-11-10 NOTE — PLAN OF CARE
SW met with patient at bedside to complete discharge planning assessment.  Patient alert and oriented xs 4.  Patient verified all demographic information on facesheet is correct.  Patient verified PCP is Darien Butler.  Patient verified primary health insurance is [employer] ([place] Medicaid).  Patient with NO home health and listed DME.  Patient with NO POA or Living Will.  Patient not on dialysis or medication coumadin.  Patient with no 30 day admission.  Patient with no financial issues at this time.  Patient family will provide transportation upon discharge from facility.  Patient independent with ADLs, live with parents and 2 teenage sons, drives self.  Patient uses [facility] - [place], [place] - [address].       11/10/20 1047   Discharge Assessment   Assessment Type Discharge Planning Assessment   Confirmed/corrected address and phone number on facesheet? Yes   Assessment information obtained from? Patient   Expected Length of Stay (days) 1   Communicated expected length of stay with patient/caregiver yes   Prior to hospitilization cognitive status: Alert/Oriented   Prior to hospitalization functional status: Independent   Current cognitive status: Alert/Oriented   Current Functional Status: Independent   Facility Arrived From: home   Lives With child(adeline), dependent;parent(s)   Able to Return to Prior Arrangements yes   Is patient able to care for self after discharge? Yes   Patient's perception of discharge disposition home or selfcare   Readmission Within the Last 30 Days no previous admission in last 30 days   Patient currently being followed by outpatient case management? No   Patient currently receives any other outside agency services? No   Equipment Currently Used at Home bedside commode;walker, rolling   Do you have any problems affording any of your prescribed medications? No   Does the patient have transportation home? Yes   Transportation Anticipated family or friend will  provide   Does the patient receive services at the [facility]madin Clinic? No   Discharge Plan A Home   Discharge Plan B Home with family   DME Needed Upon Discharge  none   Patient/Family in Agreement with Plan yes

## 2020-11-10 NOTE — PLAN OF CARE
Patient with no PT benefits with home health services.  MD notified via secure message.  Patient scheduled for outpatient PT appointments.       11/10/20 0838   Post-Acute Status   Post-Acute Authorization Home Health   Home Health Status Insurance Denial

## 2020-11-10 NOTE — RESPIRATORY THERAPY
11/09/20 1941   PRE-TX-O2   O2 Device (Oxygen Therapy) room air   SpO2 96 %   Pulse Oximetry Type Intermittent   $ Pulse Oximetry - Multiple Charge Pulse Oximetry - Multiple

## 2020-11-10 NOTE — NURSING
New pain medication sent to pharmacy via JODY Bright. Updated patient and patient agreeable to discharge.Dressing changed. Awaiting ride

## 2020-11-10 NOTE — SUBJECTIVE & OBJECTIVE
"Principal Problem:Osteoarthritis of left knee    Principal Orthopedic Problem: S/P L TKA    Interval History: none    Review of patient's allergies indicates:  No Known Allergies    Current Facility-Administered Medications   Medication    aluminum-magnesium hydroxide-simethicone 200-200-20 mg/5 mL suspension 30 mL    aspirin chewable tablet 81 mg    bisacodyL suppository 10 mg    celecoxib capsule 100 mg    famotidine tablet 20 mg    morphine injection 2 mg    ondansetron disintegrating tablet 8 mg    oxyCODONE 12 hr tablet 10 mg    oxyCODONE immediate release tablet 5 mg    oxyCODONE immediate release tablet Tab 10 mg    polyethylene glycol packet 17 g    pregabalin capsule 75 mg    sodium chloride 0.9% flush 5 mL    zolpidem tablet 5 mg     Objective:     Vital Signs (Most Recent):  Temp: 97.9 °F (36.6 °C) (11/10/20 0334)  Pulse: 66 (11/10/20 0334)  Resp: 20 (11/10/20 0626)  BP: 133/70 (11/10/20 0334)  SpO2: 95 % (11/10/20 0334) Vital Signs (24h Range):  Temp:  [97.1 °F (36.2 °C)-98.2 °F (36.8 °C)] 97.9 °F (36.6 °C)  Pulse:  [63-80] 66  Resp:  [12-20] 20  SpO2:  [91 %-100 %] 95 %  BP: ()/(54-80) 133/70     Weight: (!) 137 kg (302 lb)  Height: 5' 9" (175.3 cm)  Body mass index is 44.6 kg/m².      Intake/Output Summary (Last 24 hours) at 11/10/2020 0651  Last data filed at 11/9/2020 1600  Gross per 24 hour   Intake 3086.25 ml   Output --   Net 3086.25 ml       General    Nursing note and vitals reviewed.  Constitutional: She is oriented to person, place, and time.   Obese   Pulmonary/Chest: Effort normal.   Neurological: She is alert and oriented to person, place, and time.   Psychiatric: She has a normal mood and affect. Her behavior is normal.             Left Knee Exam     Comments:  LLE DNVI. Dressing C/D/I      Significant Labs:   CBC:   Recent Labs   Lab 11/10/20  0606   WBC 9.84   HGB 13.4   HCT 42.1        CMP: No results for input(s): NA, K, CL, CO2, GLU, BUN, CREATININE, " CALCIUM, PROT, ALBUMIN, BILITOT, ALKPHOS, AST, ALT, ANIONGAP, EGFRNONAA in the last 48 hours.    Invalid input(s): ESTGF[place]  All pertinent labs within the past 24 hours have been reviewed.    Significant Imaging: X-Ray: I have reviewed all pertinent results/findings and my personal findings are:  The patient has undergone a left knee joint replacement with metallic femoral and tibial components in good position.  No fractures are seen.

## 2020-11-10 NOTE — PLAN OF CARE
Pt already has post op scheduled. AVS updated.   Already has outpt therapy scheduled. AVS updated.

## 2020-11-11 ENCOUNTER — CLINICAL SUPPORT (OUTPATIENT)
Dept: REHABILITATION | Facility: HOSPITAL | Age: 45
End: 2020-11-11
Attending: ORTHOPAEDIC SURGERY
Payer: MEDICAID

## 2020-11-11 DIAGNOSIS — M25.662 DECREASED RANGE OF MOTION OF LEFT KNEE: ICD-10-CM

## 2020-11-11 DIAGNOSIS — R29.898 LEFT LEG WEAKNESS: ICD-10-CM

## 2020-11-11 DIAGNOSIS — M17.12 OSTEOARTHRITIS OF LEFT KNEE: ICD-10-CM

## 2020-11-11 DIAGNOSIS — M17.12 OSTEOARTHRITIS OF LEFT KNEE, UNSPECIFIED OSTEOARTHRITIS TYPE: ICD-10-CM

## 2020-11-11 PROBLEM — R26.9 GAIT ABNORMALITY: Status: RESOLVED | Noted: 2020-08-28 | Resolved: 2020-11-11

## 2020-11-11 PROCEDURE — 97161 PT EVAL LOW COMPLEX 20 MIN: CPT | Mod: PN

## 2020-11-11 PROCEDURE — 97110 THERAPEUTIC EXERCISES: CPT | Mod: PN

## 2020-11-11 NOTE — PROGRESS NOTES
Thank you for the referral.  Please see POC.    I certify that I was present in the room directing the student in service delivery and guiding them using my skilled judgment. As the co-signing therapist I have reviewed the students documentation and am responsible for the treatment, assessment, and plan.     - Frank Saleh, PT

## 2020-11-11 NOTE — PLAN OF CARE
"[facility]  Physical Therapy Initial Evaluation    Date: 2020   Name: Qiana Collins  Clinic Number: [id number]    Therapy Diagnosis:   Encounter Diagnoses   Name Primary?    Osteoarthritis of left knee     Decreased range of motion of left knee     Left leg weakness      Physician: Silver Bob MD    Physician Orders: PT Eval and Treat   Medical Diagnosis from Referral: status post total knee replacement L  Evaluation Date: 2020  Authorization Period Expiration: 2020  Plan of Care Expiration: 20  Visit # / Visits authorized:     Time In: 1135  Time Out: 1205  Total Appointment Time (timed & untimed codes): 30 minutes    Precautions: Standard, Diabetes and Fall    Subjective   Date of onset: 20  History of current condition - Qiana reports: she underwent a L TKA on 20. Patient also previously underwent a R knee replacement about 3 mo ago.  Patient states "that the L TKA has been a lot less painful than the R."  Patient reports that her pain limits her sleep and is worse when she wakes up at night.     Medical History:   Past Medical History:   Diagnosis Date    Arthritis     Asthma     Bone spur of other site     Diabetes mellitus, type 2     Hyperlipemia     Hypertension     Knee pain        Surgical History:   Qiana Collins  has a past surgical history that includes uterin ablastion; hysterosocpy polyp removal; Tubal ligation; Knee arthroscopy;  section; Knee Arthroplasty (Right, 2020); Joint replacement (Right, 2020); and Knee Arthroplasty (Left, 2020).    Medications:   Qiana has a current medication list which includes the following prescription(s): albuterol, benzoyl peroxide, chloe blood glucose test strip, celecoxib, clotrimazole-betamethasone 1-0.05%, flovent hfa, furosemide, gabapentin, lancets, methocarbamol, nystatin, oxycodone, oxycodone-acetaminophen, potassium chloride, and serevent " diskus.    Allergies:   Review of patient's allergies indicates:  No Known Allergies     Imaging, X-Ray of R knee on 11/09/20: The patient has undergone a left knee joint replacement with metallic femoral and tibial components in good position. No fractures are seen.     Prior Therapy: PT for R knee s/p R TKA 3 mo ago  Social History:  lives with her family in 1 story home  Occupation: disabled   Prior Level of Function: independent   Current Level of Function: ambulates w/ RW, min to mod difficulty w/ ADLs    Pain:  Current 3/10, worst 10/10, best 0/10   Location: R knee  Description: Aching and Dull  Aggravating Factors: Flexion and Extension   Easing Factors: pain medication, ice and rest    Patients goals: decrease pain and improve function    Objective     Posture: guarded d/t pain  Palpation: NT  Sensation: some numbness in L knee reported d/t nerve block  DTRs: NT  Edema: mild swelling in L    Range of Motion/Strength:     Hip  Right   Left  Pain/Dysfunction with Movement    AROM PROM MMT AROM PROM MMT    Flexion   WFL    NT   3-   WFL    NT   4+    Extension   WFL    NT   NT   WFL    NT   NT    Abduction   WFL    NT   NT   WFL    NT   NT    Adduction   WFL    NT   NT   WFL    NT   NT    Internal rotation   WFL    NT   NT   WFL    NT   NT    External rotation   WFL    NT   NT   WFL    NT   NT        Knee  Right   Left  Pain/Dysfunction with Movement    AROM PROM MMT AROM PROM MMT    Flexion   WFL    NT   NT   102*    NT   NT  ROM taken in supine   Extension   WFL    NT   3-   -10*     NT   4+  ROM taken in supine     Ankle  Right   Left  Pain/Dysfunction with Movement    AROM PROM MMT AROM PROM MMT    Plantarflexion   WFL    NT   NT   WFL    NT   NT    Dorsiflexion   WFL    NT   4+   WFL    NT   4+    Inversion   WFL    NT   NT   WFL    NT    NT    Eversion   WFL    NT   NT   WFL    NT   NT         Gait: 2 x 40 ft w/ RW and SBA  Analysis: WFL  Falls: none reported   Bed Mobility: SBA  Transfers:  supervision  Special Tests: LEFS - 26/80      Limitation/Restriction for FOTO n/a Survey    Therapist reviewed FOTO scores for Qiana Collins on 11/11/2020.     Limitation Score: n/a%         TREATMENT   Treatment Time In: 1150  Treatment Time Out: 1205  Total Treatment time (time-based codes) separate from Evaluation: 15 minutes    Qiana received therapeutic exercises to develop strength and ROM for 15 minutes including:     X 15 QS (L)   X 15 heel slides w/ sheet (L)    X 15 hip abd/add (windshield wipers) (L)   X 15 SAQ (L)   X 10 SLR    Home Exercises and Patient Education Provided    Education provided:   - continue use of ice at home for pain    Written Home Exercises Provided: none.    Assessment   Qiana is a 45 y.o. female referred to outpatient Physical Therapy with a medical diagnosis of status post total knee replacement L. Patient presents with increased pain, decreased ROM, and decreased strength of the L knee.  These impairments limit her performance of ADLs.      Patient prognosis is Good.   Patientt will benefit from skilled outpatient Physical Therapy to address the deficits stated above and in the chart below, provide patient /family education, and to maximize patientt's level of independence.     Plan of care discussed with patient: Yes  Patient's spiritual, cultural and educational needs considered and patient is agreeable to the plan of care and goals as stated below:     Anticipated Barriers for therapy: pain    Medical Necessity is demonstrated by the following  History  Co-morbidities and personal factors that may impact the plan of care Co-morbidities:   COPD/asthma, diabetes, HTN and prior abdominal surgery    Personal Factors:   no deficits     low   Examination  Body Structures and Functions, activity limitations and participation restrictions that may impact the plan of care Body Regions:   lower extremities    Body Systems:    ROM  strength    Participation Restrictions:    none    Activity limitations:   Learning and applying knowledge  no deficits    General Tasks and Commands  no deficits    Communication  no deficits    Mobility  lifting and carrying objects  walking  no deficits    Self care  washing oneself (bathing, drying, washing hands)  caring for body parts (brushing teeth, shaving, grooming)  dressing    Domestic Life  shopping  doing house work (cleaning house, washing dishes, laundry)    Interactions/Relationships  no deficits    Life Areas  no deficits    Community and Social Life  no deficits         low   Clinical Presentation stable and uncomplicated low   Decision Making/ Complexity Score: low     Goals:  Short Term Goals (2 Weeks):   1. Decrease patient's c/o pain to 2/10 during performance of ADL's for independence of self care activities.  2. Patient to ascend/descend ramp 60 ft w/ RW and SBA to demo safe mobility on outdoor obstacles.  3. Patient will report an improvement in performance of her usual housework from a little bit of difficulty to no difficulty.  4. Patient will obtain -8 to 110 degrees AROM lt knee (supine) to improve available ROM.    Long Term Goals (4 Weeks):   1. Patient to demo comp w/ HEP to maintain therapeutic gains.  2. Patient to improve L knee MMT 1/2 grade to demo strength gains from therapeutic intervention.  3. Patient to ambulate 150 ft w/ out AD and SBA with normal paddy and symmetry.  4. Patient to ascend/descend 4 steps (6 in) to demo safe community mobility.      Plan   Plan of care Certification: 11/11/2020 to 12/12/20.    Outpatient Physical Therapy 2 times a week for 1 week then 3 times weekly for 4 weeks to include the following interventions: Electrical Stimulation for pain, Gait Training, Manual Therapy, Moist Heat/ Ice, Neuromuscular Re-ed, Patient Education, Self Care, Therapeutic Activites, Therapeutic Exercise, Ultrasound and HEP.     I certify that I was present in the room directing the student in service delivery  and guiding them using my skilled judgment. As the co-signing therapist I have reviewed the students documentation and am responsible for the treatment, assessment, and plan.     - Frank Saleh, PT      Nathalie Gunn, SPT

## 2020-11-11 NOTE — PT/OT/SLP EVAL
Occupational Therapy   Evaluation and Discharge Note    Name: Qiana Collins  MRN: [id number]  Admitting Diagnosis:  Osteoarthritis of left knee 1 Day Post-Op    Recommendations:     Discharge Recommendations: home, other (see comments)(no follow up OT services needed)  Discharge Equipment Recommendations:  none  Barriers to discharge:  None    Assessment:     Qiana Collins is a 45 y.o. female with a medical diagnosis of Osteoarthritis of left knee. At this time, patient is functioning at level appropriate for discharge home with assistance from family. Patient requires (A) to don/doff  socks from bed level - pt reports her teenager assists her with this task, Independent to don/doff UB clothing, and close SBA to toilet. Patient's biggest barrier is pain.  PLOF is Independent and patient will have 24/7 assistance/supervision from family upon discharge.     Plan:     During this hospitalization, patient does not require further acute OT services.  Please re-consult if situation changes.    · Plan of Care Reviewed with: patient    Subjective     Chief Complaint: Pain of L knee (7/10)  Patient/Family Comments/goals: Pain management     Occupational Profile:  Living Environment: Lives with family in [facility]   Previous level of function: Independent to Mod (I) - pt already owns RW and BSC from previous knee surgery  Equipment Used at home:  bedside commode, walker, rolling  Assistance upon Discharge: Family    Pain/Comfort:  · Pain Rating 1: 7/10  · Location - Side 1: Left  · Location - Orientation 1: generalized  · Location 1: knee  · Pain Addressed 1: Reposition, Distraction, Nurse notified  · Pain Rating Post-Intervention 1: other (see comments)(not rated)    Patients cultural, spiritual, [employer] conflicts given the current situation: no    Objective:     Communicated with: Halie Snell prior to session.  Patient found HOB elevated with peripheral IV, cryotherapy, bed alarm upon OT entry to room.    General  Precautions: Standard, fall   Orthopedic Precautions:LLE weight bearing as tolerated   Braces: N/A     Occupational Performance:    Bed Mobility:    · Patient completed Rolling/Turning to Left with  stand by assistance  · Patient completed Rolling/Turning to Right with stand by assistance  · Patient completed Scooting/Bridging with stand by assistance    Activities of Daily Living:  · Feeding:  independence    · Grooming: independence    · Upper Body Dressing: independence    · Lower Body Dressing: Mod/Max (A) to don/doff socks - pt reports that she will assistance at home with this task (as she did with her previous knee sx)    · Toileting: SBA      Cognitive/Visual Perceptual:  Oriented x 4 - pleasant and cooperative; safety awareness intact    Physical Exam:  Edema:  Generalized L LE  Upper Extremity Strength:    -       Right Upper Extremity: WNL  -       Left Upper Extremity: WNL    AMPAC 6 Click ADL:  AMPAC Total Score: 21    Treatment & Education:  - OTR providing education/instruction regarding OT role/purpose, safety awareness/fall prevention, and reinforcing adaptive dressing techniques, correct transfer sequence/techniques, correct/safe walker management and reinforcing patient utilie RW with ALL mobility until clearance from PT, correct/safe set-up of bedside commode and as shower chair - pt verbalizes/demonstrates understanding and in agreement when appropriate  Education:    Patient left HOB elevated with all lines intact, call button in reach, bed alarm on and nurseHalie notified    GOALS:   Multidisciplinary Problems     Occupational Therapy Goals     Not on file                History:     Past Medical History:   Diagnosis Date    Arthritis     Asthma     Bone spur of other site     Diabetes mellitus, type 2     Hyperlipemia     Hypertension     Knee pain        Past Surgical History:   Procedure Laterality Date     SECTION      x2     hysterosocpy polyp removal      JOINT  REPLACEMENT Right 08/24/2020    KNEE    KNEE ARTHROPLASTY Right 8/24/2020    Procedure: ARTHROPLASTY, KNEE;  Surgeon: Silver Bob MD;  Location: [facility];  Service: Orthopedics;  Laterality: Right;  Everett De La Garza. converted to general/LMA.    KNEE ARTHROSCOPY      TUBAL LIGATION      uterin ablastion         Time Tracking:     OT Date of Treatment: 11/10/20  OT Start Time: 0934  OT Stop Time: 0947  OT Total Time (min): 13 min    Billable Minutes:Evaluation 13    JOANNA Nicholson, ROMEO  11/10/2020

## 2020-11-12 VITALS
BODY MASS INDEX: 43.4 KG/M2 | WEIGHT: 293 LBS | HEART RATE: 76 BPM | TEMPERATURE: 98 F | HEIGHT: 69 IN | RESPIRATION RATE: 18 BRPM | OXYGEN SATURATION: 98 % | SYSTOLIC BLOOD PRESSURE: 122 MMHG | DIASTOLIC BLOOD PRESSURE: 60 MMHG

## 2020-11-12 NOTE — DISCHARGE SUMMARY
[facility] Hospital Medicine  Discharge Summary      Patient Name: Qiana Collins  MRN: [id number]  Admission Date: 11/9/2020  Hospital Length of Stay: 0 days  Discharge Date and Time: 11/10/2020  1:09 PM  Attending Physician: No att. providers found   Discharging Provider: Osiris Lan MD  Primary Care Provider: Darien Odonnell MD      HPI:   Patient is a 45-year-old  female with past medical history significant for morbid obesity, osteoarthritis of the knee, type 2 diabetes mellitus, hypertension, hyperlipidemia patient is being seen postoperatively.  Patient admitted after total left knee arthroplasty by Dr. Silver geronimo on 11/09/2020.  Patient denies any chest pain, shortness of breath palpitation abdominal pain before the surgery.  Patient currently complains of some left knee pain otherwise denies any chest pain, shortness of breath, abdominal pain, nausea &vomiting.  Patient is tolerating food.    Procedure(s) (LRB):  ARTHROPLASTY, KNEE (Left)      Hospital Course:   Patient was admitted postoperatively.  Patient did well.  Pain was well controlled.  Patient worked with physical therapy patient will be discharged home with outpatient physical therapy.  Patient has been cleared by Orthopedics for discharge.  And will follow-up with orthopedics outpatient.  Patient will be taking aspirin 325 mg b.i.d...  Has been prescribde as needed pain medication. Patient was seen on day of discharge by myself and was examined.  She stable for discharge. Discharge plan, follow up instructions, and contacts in case of further needs were discussed in detail.       Consults:     No new Assessment & Plan notes have been filed under this hospital service since the last note was generated.  Service: Hospital Medicine    Final Active Diagnoses:    Diagnosis Date Noted POA    PRINCIPAL PROBLEM:  Osteoarthritis of left knee [M17.12] 10/09/2020 Yes    Essential hypertension [I10] 08/25/2020 Yes     Controlled type 2 diabetes mellitus [E11.9] 08/25/2020 Yes    Morbid obesity [E66.01] 08/25/2020 Yes      Problems Resolved During this Admission:       Discharged Condition: stable    Disposition: Home or Self Care    Follow Up:  Follow-up Information     Darien Odonnell MD In 2 weeks.    Specialties: Internal Medicine, Pediatrics  Contact information:  [address] [place] [address]  [phone]             Silver Bob MD In 1 week.    Specialties: Orthopedic Surgery, Surgery, Sports Medicine  Contact information:  [address]  [place] [address]  [phone]                 Patient Instructions:      Ambulatory referral/consult to Home Health   Standing Status: Future   Referral Priority: Routine Referral Type: Home Health   Referral Reason: Specialty Services Required   Requested Specialty: Home Health Services   Number of Visits Requested: 1     Ambulatory referral/consult to Physical/Occupational Therapy   Standing Status: Future   Referral Priority: Routine Referral Type: Physical Medicine   Referral Reason: Specialty Services Required   Number of Visits Requested: 1     Diet Adult Regular     Notify your health care provider if you experience any of the following:  temperature >100.4     Notify your health care provider if you experience any of the following:  severe uncontrolled pain     Notify your health care provider if you experience any of the following:  redness, tenderness, or signs of infection (pain, swelling, redness, odor or green/yellow discharge around incision site)       Significant Diagnostic Studies: Labs: BMP: No results for input(s): GLU, NA, K, CL, CO2, BUN, CREATININE, CALCIUM, MG in the last 48 hours., CMP No results for input(s): NA, K, CL, CO2, GLU, BUN, CREATININE, CALCIUM, PROT, ALBUMIN, BILITOT, ALKPHOS, AST, ALT, ANIONGAP, ESTGFRAFRICA, EGFRNONAA in the last 48 hours. and CBC No results for input(s): WBC, HGB, HCT, PLT in the last 48 hours.    Pending  Diagnostic Studies:     None         Medications:  Reconciled Home Medications:      Medication List      START taking these medications    oxyCODONE-acetaminophen 7.5-325 mg per tablet  Commonly known as: PERCOCET  Take 1 tablet by mouth every 4 (four) hours as needed for Pain.        CHANGE how you take these medications    benzoyl peroxide 10 % external wash  Commonly known as: BENZAC AC  WASH back nightly as tolerated  What changed: additional instructions     clotrimazole-betamethasone 1-0.05% cream  Commonly known as: LOTRISONE  APPLY topically TWICE DAILY  What changed:   · when to take this  · reasons to take this        CONTINUE taking these medications    albuterol 90 mcg/actuation inhaler  Commonly known as: PROVENTIL/VENTOLIN HFA  Rescue     celecoxib 200 MG capsule  Commonly known as: CeleBREX  TAKE 1 CAPSULE BY MOUTH 2 TIMES DAILY     SKYLER BLOOD GLUCOSE TEST STRIP Strp  Generic drug: blood sugar diagnostic  1 strip by Misc.(Non-Drug; Combo Route) route once daily.     FLOVENT  mcg/actuation inhaler  Generic drug: fluticasone propionate  Inhale 2 puffs into the lungs 2 (two) times daily.     furosemide 40 MG tablet  Commonly known as: LASIX  Take 1 tablet (40 mg total) by mouth daily as needed (swelling).     gabapentin 600 MG tablet  Commonly known as: NEURONTIN  TAKE 1 TABLET BY MOUTH 3 TIMES DAILY AS NEEDED FOR NERVE PAIN     lancets 33 gauge Misc  1 lancet by Misc.(Non-Drug; Combo Route) route once daily.     methocarbamoL 750 MG Tab  Commonly known as: ROBAXIN  Take 1 tablet (750 mg total) by mouth 2 (two) times daily. Prn muscle relaxant     nystatin powder  Commonly known as: MYCOSTATIN  APPLY TO THE affected AREA TWICE DAILY     potassium chloride 10 MEQ Cpsr  Commonly known as: MICRO-K  Take 1 capsule (10 mEq total) by mouth once daily.     SEREVENT DISKUS 50 mcg/dose diskus inhaler  Generic drug: salmeteroL  INHALE 1 PUFFS INTO THE LUNGS EVERY 12 HOURS        ASK your doctor about  these medications    oxyCODONE 15 MG Tab  Commonly known as: ROXICODONE  Take 1 tablet (15 mg total) by mouth every 4 (four) hours as needed for Pain.            Indwelling Lines/Drains at time of discharge:   Lines/Drains/Airways     None                 Time spent on the discharge of patient: 45 minutes  Patient was seen and examined on the date of discharge and determined to be suitable for discharge.         Osiris Lan MD  Department of Hospital Medicine  [facility]

## 2020-11-12 NOTE — DISCHARGE SUMMARY
[facility] Hospital Medicine  Discharge Summary      Patient Name: Qiana Collins  MRN: [id number]  Admission Date: 11/9/2020  Hospital Length of Stay: 0 days  Discharge Date and Time: 11/10/2020  1:09 PM  Attending Physician: No att. providers found   Discharging Provider: Osiris Lan MD  Primary Care Provider: Darien Odonnell MD      HPI:   Patient is a 45-year-old  female with past medical history significant for morbid obesity, osteoarthritis of the knee, type 2 diabetes mellitus, hypertension, hyperlipidemia patient is being seen postoperatively.  Patient admitted after total left knee arthroplasty by Dr. Silver geronimo on 11/09/2020.  Patient denies any chest pain, shortness of breath palpitation abdominal pain before the surgery.  Patient currently complains of some left knee pain otherwise denies any chest pain, shortness of breath, abdominal pain, nausea &vomiting.  Patient is tolerating food.    Procedure(s) (LRB):  ARTHROPLASTY, KNEE (Left)      Hospital Course:   Patient was admitted postoperatively.  Patient did well.  Pain was well controlled.  Patient worked with physical therapy patient will be discharged home with outpatient physical therapy.  Patient has been cleared by Orthopedics for discharge.  And will follow-up with orthopedics outpatient.  Patient takes aspirin at home.  Has been prescribe as needed pain medication. Patient was seen on day of discharge by myself and was examined.  She stable for discharge. Discharge plan, follow up instructions, and contacts in case of further needs were discussed in detail.       Consults:     No new Assessment & Plan notes have been filed under this hospital service since the last note was generated.  Service: Hospital Medicine    Final Active Diagnoses:    Diagnosis Date Noted POA    PRINCIPAL PROBLEM:  Osteoarthritis of left knee [M17.12] 10/09/2020 Yes    Essential hypertension [I10] 08/25/2020 Yes    Controlled type 2  diabetes mellitus [E11.9] 08/25/2020 Yes    Morbid obesity [E66.01] 08/25/2020 Yes      Problems Resolved During this Admission:       Discharged Condition: stable    Disposition: Home or Self Care    Follow Up:  Follow-up Information     Darien Odonnell MD In 2 weeks.    Specialties: Internal Medicine, Pediatrics  Contact information:  [address] [place] [address]  [phone]             Silver Bob MD In 1 week.    Specialties: Orthopedic Surgery, Surgery, Sports Medicine  Contact information:  [address]  [place] [address]  [phone]                 Patient Instructions:      Ambulatory referral/consult to Home Health   Standing Status: Future   Referral Priority: Routine Referral Type: Home Health   Referral Reason: Specialty Services Required   Requested Specialty: Home Health Services   Number of Visits Requested: 1     Ambulatory referral/consult to Physical/Occupational Therapy   Standing Status: Future   Referral Priority: Routine Referral Type: Physical Medicine   Referral Reason: Specialty Services Required   Number of Visits Requested: 1     Diet Adult Regular     Notify your health care provider if you experience any of the following:  temperature >100.4     Notify your health care provider if you experience any of the following:  severe uncontrolled pain     Notify your health care provider if you experience any of the following:  redness, tenderness, or signs of infection (pain, swelling, redness, odor or green/yellow discharge around incision site)       Significant Diagnostic Studies: Labs: BMP: No results for input(s): GLU, NA, K, CL, CO2, BUN, CREATININE, CALCIUM, MG in the last 48 hours., CMP No results for input(s): NA, K, CL, CO2, GLU, BUN, CREATININE, CALCIUM, PROT, ALBUMIN, BILITOT, ALKPHOS, AST, ALT, ANIONGAP, ESTGFRAFRICA, EGFRNONAA in the last 48 hours. and CBC No results for input(s): WBC, HGB, HCT, PLT in the last 48 hours.    Pending Diagnostic Studies:      None         Medications:  Reconciled Home Medications:      Medication List      START taking these medications    oxyCODONE-acetaminophen 7.5-325 mg per tablet  Commonly known as: PERCOCET  Take 1 tablet by mouth every 4 (four) hours as needed for Pain.        CHANGE how you take these medications    benzoyl peroxide 10 % external wash  Commonly known as: BENZAC AC  WASH back nightly as tolerated  What changed: additional instructions     clotrimazole-betamethasone 1-0.05% cream  Commonly known as: LOTRISONE  APPLY topically TWICE DAILY  What changed:   · when to take this  · reasons to take this        CONTINUE taking these medications    albuterol 90 mcg/actuation inhaler  Commonly known as: PROVENTIL/VENTOLIN HFA  Rescue     celecoxib 200 MG capsule  Commonly known as: CeleBREX  TAKE 1 CAPSULE BY MOUTH 2 TIMES DAILY     SKYLER BLOOD GLUCOSE TEST STRIP Strp  Generic drug: blood sugar diagnostic  1 strip by Misc.(Non-Drug; Combo Route) route once daily.     FLOVENT  mcg/actuation inhaler  Generic drug: fluticasone propionate  Inhale 2 puffs into the lungs 2 (two) times daily.     furosemide 40 MG tablet  Commonly known as: LASIX  Take 1 tablet (40 mg total) by mouth daily as needed (swelling).     gabapentin 600 MG tablet  Commonly known as: NEURONTIN  TAKE 1 TABLET BY MOUTH 3 TIMES DAILY AS NEEDED FOR NERVE PAIN     lancets 33 gauge Misc  1 lancet by Misc.(Non-Drug; Combo Route) route once daily.     methocarbamoL 750 MG Tab  Commonly known as: ROBAXIN  Take 1 tablet (750 mg total) by mouth 2 (two) times daily. Prn muscle relaxant     nystatin powder  Commonly known as: MYCOSTATIN  APPLY TO THE affected AREA TWICE DAILY     potassium chloride 10 MEQ Cpsr  Commonly known as: MICRO-K  Take 1 capsule (10 mEq total) by mouth once daily.     SEREVENT DISKUS 50 mcg/dose diskus inhaler  Generic drug: salmeteroL  INHALE 1 PUFFS INTO THE LUNGS EVERY 12 HOURS        ASK your doctor about these medications     oxyCODONE 15 MG Tab  Commonly known as: ROXICODONE  Take 1 tablet (15 mg total) by mouth every 4 (four) hours as needed for Pain.            Indwelling Lines/Drains at time of discharge:   Lines/Drains/Airways     None                 Time spent on the discharge of patient: 45 minutes  Patient was seen and examined on the date of discharge and determined to be suitable for discharge.         Osiris Lan MD  Department of Hospital Medicine  [facility]

## 2020-11-12 NOTE — HOSPITAL COURSE
Patient was admitted postoperatively.  Patient did well.  Pain was well controlled.  Patient worked with physical therapy patient will be discharged home with outpatient physical therapy.  Patient has been cleared by Orthopedics for discharge.  And will follow-up with orthopedics outpatient.  Patient will be taking aspirin 325 mg b.i.d...  Has been prescribde as needed pain medication. Patient was seen on day of discharge by myself and was examined.  She stable for discharge. Discharge plan, follow up instructions, and contacts in case of further needs were discussed in detail.

## 2020-11-13 ENCOUNTER — CLINICAL SUPPORT (OUTPATIENT)
Dept: REHABILITATION | Facility: HOSPITAL | Age: 45
End: 2020-11-13
Attending: ORTHOPAEDIC SURGERY
Payer: MEDICAID

## 2020-11-13 DIAGNOSIS — M25.662 DECREASED RANGE OF MOTION OF LEFT KNEE: ICD-10-CM

## 2020-11-13 DIAGNOSIS — R29.898 LEFT LEG WEAKNESS: ICD-10-CM

## 2020-11-13 DIAGNOSIS — Z96.652 S/P TKR (TOTAL KNEE REPLACEMENT), LEFT: Primary | ICD-10-CM

## 2020-11-13 PROCEDURE — 97110 THERAPEUTIC EXERCISES: CPT | Mod: PN

## 2020-11-13 NOTE — PROGRESS NOTES
Physical Therapy Treatment Note     [facility]  Cl[facility] Number: [id number]    Therapy Diagnosis:   Encounter Diagnoses   Name Primary?    S/P TKR (total knee replacement), left Yes    Decreased range of motion of left knee     Left leg weakness      Physician: Silver Bob MD    Visit Date: 11/13/2020    Physician Orders: PT Eval and Treat   Medical Diagnosis from Referral: status post total knee replacement L  Evaluation Date: 11/11/2020  Authorization Period Expiration: 12/31/2020  Plan of Care Expiration: 12/12/20   Visit # / Visits authorized: 2/20    Time In: 1625  Time Out: 1710  Total BillableTime: 45 minutes    Precautions: Standard, Diabetes and Fall    Subjective     Pt reports: mild/mod pain in R knee.  She does not have a home exercise program.  Response to previous treatment: no change  Functional change: no change    Pain: 3-4/10  Location: R knee    Objective     Qiana received therapeutic exercises to develop strength, endurance and ROM for 37 minutes including:      X 10 min bike (partial revs)    X 20 supine QS (R)    X 20 supine heel slides w/ sheet (R)    X 20 supine hip abd/add R (windshield wipers)   2 x 10 supine SLR (R)   X 15 seated B LAQ   X 15 seated B marching    X 15 seated hip add (ball squeeze)    X 15 seated hip abd (YTB)   X 20 ea - HR/TR, mini squats (in parallel bars)    X 15 step ups (3 in) (in parallel bars) - forwards only    Qiana participated in gait training to improve functional mobility and safety for 8  minutes, including:      X 200 ft w/ RW and SBA    Home Exercises Provided and Patient Education Provided     Education provided:   - verbal cuing for correct performance of exercise    Written Home Exercises Provided: yes.  Exercises were reviewed and Qiana was able to demonstrate them prior to the end of the session.  Qiana demonstrated fair understanding of the education provided.     See EMR under Media for exercises provided  11/13/2020.    Assessment     Pt able to tolerate partial revs on bike as well as seated and standing LE strengthening there ex today.  Pt completed treatment session no reports of increased pain.      Qiana is not progressing well towards her goals.   Pt prognosis is Good.     Pt will continue to benefit from skilled outpatient physical therapy to address the deficits listed in the problem list box on initial evaluation, provide pt/family education and to maximize pt's level of independence in the home and community environment.     Pt's spiritual, cultural and educational needs considered and pt agreeable to plan of care and goals.     Anticipated barriers to physical therapy: pain    Goals:   Short Term Goals (2 Weeks):   1. Decrease patient's c/o pain to 2/10 during performance of ADL's for independence of self care activities. (NOT MET)  2. Patient to ascend/descend ramp 60 ft w/ RW and SBA to demo safe mobility on outdoor obstacles. (NOT MET)  3. Patient will report an improvement in performance of her usual housework from a little bit of difficulty to no difficulty. (NOT MET)  4. Patient will obtain -8 to 110 degrees AROM lt knee (supine) to improve available ROM. (NOT MET)     Long Term Goals (4 Weeks):   1. Patient to demo comp w/ HEP to maintain therapeutic gains. (NOT MET)  2. Patient to improve L knee MMT 1/2 grade to demo strength gains from therapeutic intervention. (NOT MET)  3. Patient to ambulate 150 ft w/ out AD and SBA with normal paddy and symmetry. (NOT MET)  4. Patient to ascend/descend 4 steps (6 in) to demo safe community mobility. (NOT MET)    Plan     Continue to advance ROM/strength/mobility training per pt's tolerance.    I certify that I was present in the room directing the student in service delivery and guiding them using my skilled judgment. As the co-signing therapist I have reviewed the students documentation and am responsible for the treatment, assessment, and plan.     -  Frank Saleh, PT      Nathalie Gunn, SPT

## 2020-11-16 ENCOUNTER — CLINICAL SUPPORT (OUTPATIENT)
Dept: REHABILITATION | Facility: HOSPITAL | Age: 45
End: 2020-11-16
Attending: ORTHOPAEDIC SURGERY
Payer: MEDICAID

## 2020-11-16 ENCOUNTER — PATIENT MESSAGE (OUTPATIENT)
Dept: ORTHOPEDICS | Facility: CLINIC | Age: 45
End: 2020-11-16

## 2020-11-16 DIAGNOSIS — Z96.652 STATUS POST TOTAL KNEE REPLACEMENT, LEFT: ICD-10-CM

## 2020-11-16 DIAGNOSIS — R29.898 LEFT LEG WEAKNESS: ICD-10-CM

## 2020-11-16 DIAGNOSIS — Z96.652 S/P TKR (TOTAL KNEE REPLACEMENT), LEFT: Primary | ICD-10-CM

## 2020-11-16 DIAGNOSIS — M25.662 DECREASED RANGE OF MOTION OF LEFT KNEE: ICD-10-CM

## 2020-11-16 PROCEDURE — 97110 THERAPEUTIC EXERCISES: CPT | Mod: PN

## 2020-11-16 RX ORDER — OXYCODONE HYDROCHLORIDE 15 MG/1
15 TABLET ORAL EVERY 4 HOURS PRN
Qty: 42 TABLET | Refills: 0 | Status: CANCELLED | OUTPATIENT
Start: 2020-11-16 | End: 2020-11-23

## 2020-11-16 NOTE — PROGRESS NOTES
Physical Therapy Treatment Note     Name: Qiana Collins  Clinic Number: [id number]    Therapy Diagnosis:   Encounter Diagnoses   Name Primary?    S/P TKR (total knee replacement), left Yes    Decreased range of motion of left knee     Left leg weakness      Physician: Silver Bob MD    Visit Date: 11/16/2020    Physician Orders: PT Eval and Treat   Medical Diagnosis from Referral: status post total knee replacement L  Evaluation Date: 11/11/2020  Authorization Period Expiration: 12/31/2020  Plan of Care Expiration: 12/12/2020   Visit # / Visits authorized: 3/20    Time In: 1430  Time Out: 1515  Total BillableTime: 45 minutes    Precautions: Standard, Diabetes and Fall      Subjective     Pt reports: mild/mod pain in R knee.  She does not have a home exercise program.  Response to previous treatment: no change  Functional change: no change    Pain: 4/10  Location: R knee      Objective     Qiana received therapeutic exercises to develop strength, endurance and ROM for 35 minutes including:      X 10 min bike (partial revs)    X 15 supine QS (R)    X 15 supine heel slides w/ sheet (R)    X 15 supine hip abd/add R (1#)   X 15 supine R SLR (1#)   X 15 seated R LAQ (3#)   X 15 seated B marching (3#)   X 15 seated hip add (ball squeeze)    X 15 seated hip abd (RTB)   X 20 ea - HR/TR, mini squats (in parallel bars)    X 15 ea step ups (5 in) (in parallel bars) - forwards/sideways   X 20 ea shuttle mini sq and HR/TR (50#)    Qiana participated in gait training to improve functional mobility and safety for 10 minutes, including:      X 200 ft w/ SC and SBA      Home Exercises Provided and Patient Education Provided     Education provided:   - verbal cuing for correct use of SC during amb.    Written Home Exercises Provided: Patient instructed to cont prior HEP.      Assessment     Pt able to tolerate increased resistance w/ the strengthening there ex.  Min VC needed regarding proper use of SC during  myesha Kiran is progressing fairly well towards her goals.   Pt prognosis is Good.     Pt will continue to benefit from skilled outpatient physical therapy to address the deficits listed in the problem list box on initial evaluation, provide pt/family education and to maximize pt's level of independence in the home and community environment.     Pt's spiritual, cultural and educational needs considered and pt agreeable to plan of care and goals.     Anticipated barriers to physical therapy: pain    Goals:   Short Term Goals (2 Weeks):   1. Decrease patient's c/o pain to 2/10 during performance of ADL's for independence of self care activities. (NOT MET)  2. Patient to ascend/descend ramp 60 ft w/ RW and SBA to demo safe mobility on outdoor obstacles. (NOT MET)  3. Patient will report an improvement in performance of her usual housework from a little bit of difficulty to no difficulty. (NOT MET)  4. Patient will obtain -8 to 110 degrees AROM lt knee (supine) to improve available ROM. (NOT MET)     Long Term Goals (4 Weeks):   1. Patient to demo comp w/ HEP to maintain therapeutic gains. (NOT MET)  2. Patient to improve L knee MMT 1/2 grade to demo strength gains from therapeutic intervention. (NOT MET)  3. Patient to ambulate 150 ft w/ out AD and SBA with normal paddy and symmetry. (PART MET)  4. Patient to ascend/descend 4 steps (6 in) to demo safe community mobility. (NOT MET)      Plan     Continue to advance ROM/strength/mobility training per pt's tolerance.      Frank Saleh, PT

## 2020-11-18 ENCOUNTER — CLINICAL SUPPORT (OUTPATIENT)
Dept: REHABILITATION | Facility: HOSPITAL | Age: 45
End: 2020-11-18
Attending: ORTHOPAEDIC SURGERY
Payer: MEDICAID

## 2020-11-18 DIAGNOSIS — Z96.652 S/P TKR (TOTAL KNEE REPLACEMENT), LEFT: Primary | ICD-10-CM

## 2020-11-18 DIAGNOSIS — R29.898 LEFT LEG WEAKNESS: ICD-10-CM

## 2020-11-18 DIAGNOSIS — M25.662 DECREASED RANGE OF MOTION OF LEFT KNEE: ICD-10-CM

## 2020-11-18 PROCEDURE — 97110 THERAPEUTIC EXERCISES: CPT | Mod: PN

## 2020-11-18 NOTE — PROGRESS NOTES
Physical Therapy Treatment Note     Name: Qiana Chou [facility] Number: [id number]    Therapy Diagnosis:   Encounter Diagnoses   Name Primary?    S/P TKR (total knee replacement), left Yes    Decreased range of motion of left knee     Left leg weakness      Physician: Silver Bob MD    Visit Date: 11/18/2020    Physician Orders: PT Eval and Treat   Medical Diagnosis from Referral: status post total knee replacement L  Evaluation Date: 11/11/2020  Authorization Period Expiration: 12/31/2020  Plan of Care Expiration: 12/12/2020   Visit # / Visits authorized: 4/20    Time In: 1401  Time Out: 1430  Total BillableTime: 29 minutes    Precautions: Standard, Diabetes and Fall      Subjective     Pt reports: increased swelling in L LE d/t being active the last few days.  She does not have a home exercise program.  Response to previous treatment: continued moderate pain in L knee  Functional change: no change    Pain: 4/10  Location: R knee      Objective     Qiana received therapeutic exercises to develop strength, endurance and ROM for 21 minutes including:      X 5min bike (partial revs)    X 20 supine QS (R)    X 20 supine heel slides w/ sheet (R)    X 15 supine hip abd/add R (3#)   X 15 seated R LAQ (3#)   X 15 seated B marching (3#)   X 15 seated hip add (ball squeeze)    X 20 seated hip abd (RTB)   X 20 ea - HR/TR on AirEx (in parallel bars)     Qiana participated in gait training to improve functional mobility and safety for 8 minutes, including:      X 155 ft w/ SC and SBA      Home Exercises Provided and Patient Education Provided     Education provided:   - look into acquiring a SC for use during amb.    Written Home Exercises Provided: Patient instructed to cont prior HEP.      Assessment     Pt performed there ex today with no reports of increased symptoms.  Pt only required minimal verbal cueing during ambulation for proper utilization of SC.  SC gait training initiated to begin progression from  use of RW during ambulation.    Qiana is progressing fairly well towards her goals.   Pt prognosis is Good.     Pt will continue to benefit from skilled outpatient physical therapy to address the deficits listed in the problem list box on initial evaluation, provide pt/family education and to maximize pt's level of independence in the home and community environment.     Pt's spiritual, cultural and educational needs considered and pt agreeable to plan of care and goals.     Anticipated barriers to physical therapy: pain    Goals:   Short Term Goals (2 Weeks):   1. Decrease patient's c/o pain to 2/10 during performance of ADL's for independence of self care activities. (NOT MET)  2. Patient to ascend/descend ramp 60 ft w/ RW and SBA to demo safe mobility on outdoor obstacles. (NOT MET)  3. Patient will report an improvement in performance of her usual housework from a little bit of difficulty to no difficulty. (NOT MET)  4. Patient will obtain -8 to 110 degrees AROM lt knee (supine) to improve available ROM. (NOT MET)     Long Term Goals (4 Weeks):   1. Patient to demo comp w/ HEP to maintain therapeutic gains. (NOT MET)  2. Patient to improve L knee MMT 1/2 grade to demo strength gains from therapeutic intervention. (NOT MET)  3. Patient to ambulate 150 ft w/ out AD and SBA with normal paddy and symmetry. (PART MET)  4. Patient to ascend/descend 4 steps (6 in) to demo safe community mobility. (NOT MET)      Plan     Continue to advance ROM/strength/mobility training per pt's tolerance.      Nathalie Gunn, SPT

## 2020-11-20 ENCOUNTER — NURSE TRIAGE (OUTPATIENT)
Dept: ADMINISTRATIVE | Facility: CLINIC | Age: 45
End: 2020-11-20

## 2020-11-20 NOTE — TELEPHONE ENCOUNTER
Pt contacted through Post Procedure Covid Symptom tracking for an escalation of symptoms. Pt states she accidentally responded to text and is not symptomatic. States she has no current questions jorge symptoms. Advised to call back with concerns, questions, or development of symptoms. Verbalized understanding.     Reason for Disposition   [1] Follow-up call to recent contact AND [2] information only call, no triage required    Additional Information   Negative: [1] Caller is not with the adult (patient) AND [2] reporting urgent symptoms   Negative: Lab result questions   Negative: Medication questions   Negative: Caller can't be reached by phone   Negative: Caller has already spoken to PCP or another triager   Negative: RN needs further essential information from caller in order to complete triage   Negative: Requesting regular office appointment   Negative: [1] Caller requesting NON-URGENT health information AND [2] PCP's office is the best resource    Protocols used: INFORMATION ONLY CALL - NO TRIAGE-A-

## 2020-11-23 ENCOUNTER — CLINICAL SUPPORT (OUTPATIENT)
Dept: REHABILITATION | Facility: HOSPITAL | Age: 45
End: 2020-11-23
Attending: ORTHOPAEDIC SURGERY
Payer: MEDICAID

## 2020-11-23 ENCOUNTER — OFFICE VISIT (OUTPATIENT)
Dept: ORTHOPEDICS | Facility: CLINIC | Age: 45
End: 2020-11-23
Payer: MEDICAID

## 2020-11-23 VITALS
HEART RATE: 65 BPM | DIASTOLIC BLOOD PRESSURE: 78 MMHG | HEIGHT: 69 IN | SYSTOLIC BLOOD PRESSURE: 128 MMHG | WEIGHT: 203 LBS | BODY MASS INDEX: 30.07 KG/M2

## 2020-11-23 DIAGNOSIS — Z96.652 STATUS POST TOTAL KNEE REPLACEMENT, LEFT: Primary | ICD-10-CM

## 2020-11-23 DIAGNOSIS — R29.898 LEFT LEG WEAKNESS: ICD-10-CM

## 2020-11-23 DIAGNOSIS — M25.662 DECREASED RANGE OF MOTION OF LEFT KNEE: ICD-10-CM

## 2020-11-23 PROCEDURE — 99024 PR POST-OP FOLLOW-UP VISIT: ICD-10-PCS | Mod: S$GLB,,, | Performed by: PHYSICIAN ASSISTANT

## 2020-11-23 PROCEDURE — 97110 THERAPEUTIC EXERCISES: CPT | Mod: PN,CQ

## 2020-11-23 PROCEDURE — 99024 POSTOP FOLLOW-UP VISIT: CPT | Mod: S$GLB,,, | Performed by: PHYSICIAN ASSISTANT

## 2020-11-23 RX ORDER — OXYCODONE AND ACETAMINOPHEN 7.5; 325 MG/1; MG/1
1 TABLET ORAL EVERY 4 HOURS PRN
Qty: 28 TABLET | Refills: 0 | Status: SHIPPED | OUTPATIENT
Start: 2020-11-27 | End: 2020-12-04

## 2020-11-23 NOTE — PROGRESS NOTES
[facility] ORTHOPEDICS  [address]  [place] [address]  Phone: [phone]   [phone]    Patient's PCP:Darien Odonnell MD  Referring Provider: No ref. provider found    POST-OP Note:    Subjective:        Chief Complaint:   Chief Complaint   Patient presents with    Left Knee - Post-op Evaluation     L-TKA 20; suture removal today. Pt states she is doing better everyday.       Past Medical History:   Diagnosis Date    Arthritis     Asthma     Bone spur of other site     Diabetes mellitus, type 2     Hyperlipemia     Hypertension     Knee pain        Past Surgical History:   Procedure Laterality Date     SECTION      x2     hysterosocpy polyp removal      JOINT REPLACEMENT Right 2020    KNEE    KNEE ARTHROPLASTY Right 2020    Procedure: ARTHROPLASTY, KNEE;  Surgeon: Silver Bob MD;  Location: [facility] OR;  Service: Orthopedics;  Laterality: Right;  Everett De La Garza. converted to general/LMA.    KNEE ARTHROPLASTY Left 2020    Procedure: ARTHROPLASTY, KNEE;  Surgeon: Silver Bob MD;  Location: [facility] OR;  Service: Orthopedics;  Laterality: Left;  Everettarline ZayasDe La Garza    KNEE ARTHROSCOPY      TUBAL LIGATION      uterin ablastion         Current Outpatient Medications   Medication Sig    albuterol (PROVENTIL/VENTOLIN HFA) 90 mcg/actuation inhaler Rescue    benzoyl peroxide (BENZAC AC) 10 % external wash WASH back nightly as tolerated (Patient taking differently: WASH FACE nightly as tolerated)    blood sugar diagnostic (SKYLER BLOOD GLUCOSE TEST STRIP) Strp 1 strip by Misc.(Non-Drug; Combo Route) route once daily.    celecoxib (CELEBREX) 200 MG capsule 1 po bid prn arthritis    clotrimazole-betamethasone 1-0.05% (LOTRISONE) cream APPLY topically TWICE DAILY (Patient taking differently: as needed. APPLY topically TWICE DAILY)    FLOVENT  mcg/actuation inhaler Inhale 2 puffs into the lungs 2 (two) times daily.    furosemide (LASIX) 40 MG tablet Take 1  tablet (40 mg total) by mouth daily as needed (swelling).    gabapentin (NEURONTIN) 600 MG tablet TAKE 1 TABLET BY MOUTH 3 TIMES DAILY AS NEEDED FOR NERVE PAIN    lancets 33 gauge Misc 1 lancet by Misc.(Non-Drug; Combo Route) route once daily.    methocarbamoL (ROBAXIN) 750 MG Tab Take 1 tablet (750 mg total) by mouth 2 (two) times daily. Prn muscle relaxant    nystatin (MYCOSTATIN) powder APPLY TO THE affected AREA TWICE DAILY    oxyCODONE (ROXICODONE) 15 MG Tab TAKE 1 TABLET BY MOUTH EVERY 4 HOURS AS NEEDED FOR PAIN    oxyCODONE-acetaminophen (PERCOCET) 7.5-325 mg per tablet Take 1 tablet by mouth every 4 (four) hours as needed for Pain.    potassium chloride (MICRO-K) 10 MEQ CpSR Take 1 capsule (10 mEq total) by mouth once daily.    salmeteroL (SEREVENT DISKUS) 50 mcg/dose diskus inhaler INHALE 1 PUFFS INTO THE LUNGS EVERY 12 HOURS     No current facility-administered medications for this visit.        Review of patient's allergies indicates:   Allergen Reactions    Adhesive Rash       Family History   Problem Relation Age of Onset    Diabetes Mother     Dementia Mother     Diabetes Father     Heart disease Father        Social History     Socioeconomic History    Marital status: Single     Spouse name: Not on file    Number of children: Not on file    Years of education: Not on file    Highest education level: Not on file   Occupational History    Not on file   Social Needs    Financial resource strain: Not on file    Food insecurity     Worry: Not on file     Inability: Not on file    Transportation needs     Medical: Not on file     Non-medical: Not on file   Tobacco Use    Smoking status: Former Smoker     Packs/day: 0.50     Types: Cigarettes    Smokeless tobacco: Never Used   Substance and Sexual Activity    Alcohol use: No    Drug use: Yes     Types: Marijuana     Comment: socially/pain    Sexual activity: Yes     Partners: Male     Birth control/protection: None   Lifestyle     Physical activity     Days per week: Not on file     Minutes per session: Not on file    Stress: Not on file   Relationships    Social connections     Talks on phone: Not on file     Gets together: Not on file     Attends Cheondoism service: Not on file     Active member of club or organization: Not on file     Attends meetings of clubs or organizations: Not on file     Relationship status: Not on file   Other Topics Concern    Not on file   Social History Narrative    Not on file       History of present illness:  Patient returns clinic today she is 12 days status post left total knee arthroplasty.  She is doing great!.      Review of Systems:    Musculoskeletal:  See HPI       Objective:        Physical Examination:    Vital Signs:   Vitals:    11/23/20 1038   BP: 128/78   Pulse: 65       Body mass index is 29.98 kg/m².    This a well-developed, well nourished patient in no acute distress.  They are alert and oriented and cooperative to examination.        Examination of the left knee, the patient's midline surgical incision appears to be well healed without evidence of wound failure dehiscence or infection.  Sutures were removed today.  Patient demonstrates near normal range of motion.  She lacks about 5° of extension, she can flex to about 100.  Calf is soft and nontender.    Pertinent New Results:     XRAY Report / Interpretation:        Assessment:       1. Status post total knee replacement, left      Plan:     Status post total knee replacement, left        No follow-ups on file.    Stable status post left total knee arthroplasty, will start formal physical therapy, will give her refill of her pain medications she is due to run out over the holiday Thanksgiving in a few days.  She will continue her aspirin and will see her back in a month.    @JEF@  JUNIOR Hernandez PA-C    This note was created using [employer] voice recognition software that occasionally misinterprets words or  phrases.

## 2020-11-23 NOTE — PROGRESS NOTES
"  Physical Therapy Treatment Note     Name: Qiana Chou [facility]  Clinic Number: [id number]    Therapy Diagnosis:   Encounter Diagnoses   Name Primary?    Decreased range of motion of left knee     Left leg weakness      Physician: Silver Bob MD    Visit Date: 11/23/2020    Physician Orders: PT Eval and Treat   Medical Diagnosis from Referral: status post total knee replacement L  Evaluation Date: 11/11/2020  Authorization Period Expiration: 12/31/2020  Plan of Care Certification Period: 11/11/2020 to 12/12/2020   Visit # / Visits authorized: 5/20    Time In: 1430  Time Out: 1510  Total BillableTime: 40 minutes    Precautions: Standard, Diabetes and Fall      Subjective     Pt reports: she is having no pain in her knee today.  She does not have a home exercise program.  Response to previous treatment: no change  Functional change: no change    Pain: 0/10  Location: n/a      Objective     Qiana received therapeutic exercises to develop strength, endurance and ROM for 40 minutes including:     Pt amb to PT today without AD.    Bike x 10 min 1/2 revs    Standing:  GSS 3 x 30 sec 1/2 roll  HR/TR x 20 reps  mini squats x 20 reps  step ups 5" step for/lat x 20 reps    Supine:  QS x 20 reps B LE  heel slides x 20 reps w/ sheet    hip abd/add 3# x 20 reps L LE  SAQ 3# x 20 reps L LE  SLR 3# L LE x 20 reps  LAQ 3# 20 reps L LE  ball squeeze x 20 reps   hip abd RTB x 20 reps    Home Exercises Provided and Patient Education Provided     Education provided:   n/a    Written Home Exercises Provided: Patient instructed to cont prior HEP.      Assessment     No pain reported with exercises today    Qiana is progressing fairly well towards her goals.   Pt prognosis is Good.     Pt will continue to benefit from skilled outpatient physical therapy to address the deficits listed in the problem list box on initial evaluation, provide pt/family education and to maximize pt's level of independence in the home and community " environment.     Pt's spiritual, cultural and educational needs considered and pt agreeable to plan of care and goals.     Anticipated barriers to physical therapy: pain    Goals:   Short Term Goals (2 Weeks):   1. Decrease patient's c/o pain to 2/10 during performance of ADL's for independence of self care activities. (NOT MET)  2. Patient to ascend/descend ramp 60 ft w/ RW and SBA to demo safe mobility on outdoor obstacles. (NOT MET)  3. Patient will report an improvement in performance of her usual housework from a little bit of difficulty to no difficulty. (NOT MET)  4. Patient will obtain -8 to 110 degrees AROM lt knee (supine) to improve available ROM. (NOT MET)     Long Term Goals (4 Weeks):   1. Patient to demo comp w/ HEP to maintain therapeutic gains. (NOT MET)  2. Patient to improve L knee MMT 1/2 grade to demo strength gains from therapeutic intervention. (NOT MET)  3. Patient to ambulate 150 ft w/ out AD and SBA with normal paddy and symmetry. (PART MET)  4. Patient to ascend/descend 4 steps (6 in) to demo safe community mobility. (NOT MET)      Plan     Continue to advance ROM/strength/mobility training per pt's tolerance.      Lynne Oneil, PTA

## 2020-11-25 ENCOUNTER — CLINICAL SUPPORT (OUTPATIENT)
Dept: REHABILITATION | Facility: HOSPITAL | Age: 45
End: 2020-11-25
Attending: ORTHOPAEDIC SURGERY
Payer: MEDICAID

## 2020-11-25 DIAGNOSIS — R29.898 LEFT LEG WEAKNESS: ICD-10-CM

## 2020-11-25 DIAGNOSIS — M25.662 DECREASED RANGE OF MOTION OF LEFT KNEE: ICD-10-CM

## 2020-11-25 DIAGNOSIS — Z96.652 S/P TKR (TOTAL KNEE REPLACEMENT), LEFT: Primary | ICD-10-CM

## 2020-11-25 PROCEDURE — 97110 THERAPEUTIC EXERCISES: CPT | Mod: PN

## 2020-11-25 NOTE — PROGRESS NOTES
Physical Therapy Progress Note     Name: Qiana Collins  Clinic Number: [id number]    Therapy Diagnosis:   No diagnosis found.  Physician: Silver Bob MD    Visit Date: 11/25/2020    Physician Orders: PT Eval and Treat   Medical Diagnosis from Referral: status post total knee replacement L  Evaluation Date: 11/11/2020  Authorization Period Expiration: 12/31/2020  Plan of Care Expiration: 12/12/2020   Visit # / Visits authorized: 6/20    Time In: 1345  Time Out: 1430  Total BillableTime: 45 minutes    Precautions: Standard, Diabetes and Fall      Subjective     Pt reports: that she continues to have no pain  She does not have a home exercise program.  Response to previous treatment: no pain or soreness  Functional change: no change    Pain: 0/10  Location: R knee      Objective   Range of Motion/Strength:      Hip   Right     Left   Pain/Dysfunction with Movement     AROM PROM MMT AROM PROM MMT     Flexion   WFL    NT   4+   WFL    NT   4     Extension   WFL    NT   NT   WFL    NT   NT     Abduction   WFL    NT   NT   WFL    NT   NT     Adduction   WFL    NT   NT   WFL    NT   NT     Internal rotation   WFL    NT   NT   WFL    NT   NT     External rotation   WFL    NT   NT   WFL    NT   NT           Knee   Right     Left   Pain/Dysfunction with Movement     AROM PROM MMT AROM PROM MMT     Flexion   WFL    NT   NT   114*    NT   NT  ROM taken in supine   Extension   WFL    NT   4+   -8    NT   4+  ROM taken in supine      Ankle   Right     Left   Pain/Dysfunction with Movement     AROM PROM MMT AROM PROM MMT     Plantarflexion   WFL    NT   NT   WFL    NT   NT     Dorsiflexion   WFL    NT   4+   WFL    NT   4+     Inversion   WFL    NT   NT   WFL    NT    NT     Eversion   WFL    NT   NT   WFL    NT   NT            Gait: 200 ft w/ out AD and SBA  Analysis: WFL  Falls: none reported   Bed Mobility: SBA  Transfers: SBA  Special Tests: LEFS - 60/80      Qiana received therapeutic exercises to develop strength,  endurance and ROM for 32 minutes including:      X 10 min bike (full revs)   X 20 supine QS (R)    X 20 supine heel slides w/ sheet (R)    X 15 supine hip abd/add R (3 lbs)   X 15 supine SAQ (4 lbs) (R)    X 20 ea - HR/TR on AirEx (in parallel bars)   2 x 10 ea step ups to 5 in stool - forwards, sideways     Qiana participated in gait training to improve functional mobility and safety for 8 minutes, including:      X 200 ft w/ out AD and SBA      Home Exercises Provided and Patient Education Provided     Education provided:   - continue HEP    Written Home Exercises Provided: none.      Assessment     Pt demonstrated improved R knee AROM in supine (114*-8*) and improved R LE strength.   Pt was able to tolerate full revs on the bike today.  Ms. Collins is now ambulating w/ out any AD.     Qiana is progressing fairly well towards her goals.   Pt prognosis is Good.     Pt will continue to benefit from skilled outpatient physical therapy to address the deficits listed in the problem list box on initial evaluation, provide pt/family education and to maximize pt's level of independence in the home and community environment.     Pt's spiritual, cultural and educational needs considered and pt agreeable to plan of care and goals.     Anticipated barriers to physical therapy: pain    Goals:   Short Term Goals (2 Weeks):   1. Decrease patient's c/o pain to 2/10 during performance of ADL's for independence of self care activities. (MET)  2. Patient to ascend/descend ramp 60 ft w/ RW and SBA to demo safe mobility on outdoor obstacles. (NOT MET)  3. Patient will report an improvement in performance of her usual housework from a little bit of difficulty to no difficulty. (NOT MET)  4. Patient will obtain -8 to 110 degrees AROM lt knee (supine) to improve available ROM. (MET)     Long Term Goals (4 Weeks):   1. Patient to demo comp w/ HEP to maintain therapeutic gains. (NOT MET)  2. Patient to improve L knee MMT 1/2 grade to  demo strength gains from therapeutic intervention. (MET)  3. Patient to ambulate 150 ft w/ out AD and SBA with normal paddy and symmetry. (MET)  4. Patient to ascend/descend 4 steps (6 in) to demo safe community mobility. (NOT MET)      Plan     Continue to advance ROM/strength/mobility training per pt's tolerance.    I certify that I was present in the room directing the student in service delivery and guiding them using my skilled judgment. As the co-signing therapist I have reviewed the students documentation and am responsible for the treatment, assessment, and plan.     - Frank Saleh, PT      Nathalie Gunn, SPT

## 2020-11-27 NOTE — PROGRESS NOTES
PT/PTA met face to face to discuss pt's treatment plan and progress towards established goals. Pt will be seen by a physical therapist minimally every 6th visit or every 30 days.      Frank Saleh, PT

## 2020-11-30 ENCOUNTER — CLINICAL SUPPORT (OUTPATIENT)
Dept: REHABILITATION | Facility: HOSPITAL | Age: 45
End: 2020-11-30
Attending: ORTHOPAEDIC SURGERY
Payer: MEDICAID

## 2020-11-30 DIAGNOSIS — M25.662 DECREASED RANGE OF MOTION OF LEFT KNEE: ICD-10-CM

## 2020-11-30 DIAGNOSIS — Z96.652 S/P TKR (TOTAL KNEE REPLACEMENT), LEFT: Primary | ICD-10-CM

## 2020-11-30 DIAGNOSIS — R29.898 LEFT LEG WEAKNESS: ICD-10-CM

## 2020-11-30 PROCEDURE — 97110 THERAPEUTIC EXERCISES: CPT | Mod: PN

## 2020-11-30 NOTE — PROGRESS NOTES
Physical Therapy Treatment Note     Name: Qiana Chou [facility] Number: [id number]    Therapy Diagnosis:   Encounter Diagnoses   Name Primary?    S/P TKR (total knee replacement), left Yes    Decreased range of motion of left knee     Left leg weakness      Physician: Silver Bob MD    Visit Date: 11/30/2020    Physician Orders: PT Eval and Treat   Medical Diagnosis from Referral: status post total knee replacement L  Evaluation Date: 11/11/2020  Authorization Period Expiration: 12/31/2020  Plan of Care Expiration: 12/12/2020   Visit # / Visits authorized: 7/20    Time In: 1215  Time Out: 1255  Total BillableTime: 40 minutes    Precautions: Diabetes and Fall      Subjective     Pt reports: that she continues to have mild lt knee pain  She does not have a home exercise program.  Response to previous treatment: mild pain or soreness  Functional change: no change    Pain: 1/10  Location: L knee      Objective     Qiana received therapeutic exercises to develop strength, endurance and ROM for 40 minutes including:      X 10 min bike (full revs)   X 15 supine QS (L)    X 15 supine heel slides w/ strap (L)    X 15 ea supine 4-way rt SLR (3 lbs)   X 15 supine SAQ (5 lbs) (L)    X 15 ea seated lt LE there ex = LAQ (5#), ball sq, marching (5#), hip ABD (GTB)   X 15 t-ball on wall mini sq   2 x 30 ft partial lunge walking (tylor. 2# dumbbells)    Up/down 3 x 4 (6-in) steps w/ supervision       Home Exercises Provided and Patient Education Provided     Education provided:   - continue HEP    Written Home Exercises Provided: none.      Assessment     Qiana was able to tolerate increased resistance with the SAQ and seated LE there ex; minimal c/o lt knee pain after treatment.     Pt prognosis is Good.     Pt will continue to benefit from skilled outpatient physical therapy to address the deficits listed in the problem list box on initial evaluation, provide pt/family education and to maximize pt's level of  independence in the home and community environment.     Pt's spiritual, cultural and educational needs considered and pt agreeable to plan of care and goals.     Anticipated barriers to physical therapy: pain    Goals:   Short Term Goals (2 Weeks):   1. Decrease patient's c/o pain to 2/10 during performance of ADL's for independence of self care activities. (MET)  2. Patient to ascend/descend ramp 60 ft w/ RW and SBA to demo safe mobility on outdoor obstacles. (NOT MET)  3. Patient will report an improvement in performance of her usual housework from a little bit of difficulty to no difficulty. (NOT MET)  4. Patient will obtain -8 to 110 degrees AROM lt knee (supine) to improve available ROM. (MET)     Long Term Goals (4 Weeks):   1. Patient to demo comp w/ HEP to maintain therapeutic gains. (NOT MET)  2. Patient to improve L knee MMT 1/2 grade to demo strength gains from therapeutic intervention. (MET)  3. Patient to ambulate 150 ft w/ out AD and SBA with normal paddy and symmetry. (MET)  4. Patient to ascend/descend 4 steps (6 in) to demo safe community mobility. (NOT MET)      Plan     Continue to advance ROM/strength/mobility training per pt's tolerance.      Frank Saleh, PT

## 2020-12-02 ENCOUNTER — CLINICAL SUPPORT (OUTPATIENT)
Dept: REHABILITATION | Facility: HOSPITAL | Age: 45
End: 2020-12-02
Payer: MEDICAID

## 2020-12-02 DIAGNOSIS — R29.898 LEFT LEG WEAKNESS: ICD-10-CM

## 2020-12-02 DIAGNOSIS — M25.662 DECREASED RANGE OF MOTION OF LEFT KNEE: ICD-10-CM

## 2020-12-02 PROCEDURE — 97110 THERAPEUTIC EXERCISES: CPT | Mod: PN,CQ

## 2020-12-02 NOTE — PROGRESS NOTES
"  Physical Therapy Treatment Note     [facility]  Clinic Number: [id number]    Therapy Diagnosis:   Encounter Diagnoses   Name Primary?    Decreased range of motion of left knee     Left leg weakness      Physician: Silver Bob MD    Visit Date: 12/2/2020    Physician Orders: PT Eval and Treat   Medical Diagnosis from Referral: status post total knee replacement L  Evaluation Date: 11/11/2020  Authorization Period Expiration: 12/31/2020  Plan of Care Expiration: 12/12/2020   Visit # / Visits authorized: 8/20    Time In: 0951  Time Out: 1030  Total BillableTime: 39 minutes    Precautions: Diabetes and Fall      Subjective     Pt reports: "My right knee is healing better and faster than my L knee did"  She has been compliant with her home exercise program.  Response to previous treatment: Reports "felt pretty good"  Functional change: no change    Pain: 2/10  Location: Back    Objective     Qiana received therapeutic exercises to develop strength, endurance and ROM for 39 minutes including:  X 5 min bike (full revs)  2 X 10 supine QS (L)   2 X 10 supine heel slides w/ strap (L)   2 X 10 ea supine 4-way rt SLR (3 lbs)  2 X 10 prone hamstring curl (3 lbs)  2 X 10 R S/L CLAM   2 X 10 supine SAQ (5 lbs) (L)   2 X 10 ea seated lt LE there ex = LAQ (5#), ball sq, marching (5#), hip ABD (GTB)  2 X 10 t-ball on wall mini sq  2 x 30 ft partial lunge walking (tylor. 2# dumbbells)   SLS R/L ~10s each  SLS with alt LE tapping points of star tape fwd/bwd x 2    Home Exercises Provided and Patient Education Provided     Education provided:   - continue HEP    Written Home Exercises Provided: none.      Assessment     Increased reps with all therex, added balance challenging activities as patient is with decreased SLS stabililty, no c/o pain increase or discomfort throughout session.  Will cont to increase strengthening therex within patients tolerance level.    Pt prognosis is Good.     Pt will continue to benefit " from skilled outpatient physical therapy to address the deficits listed in the problem list box on initial evaluation, provide pt/family education and to maximize pt's level of independence in the home and community environment.     Pt's spiritual, cultural and educational needs considered and pt agreeable to plan of care and goals.     Anticipated barriers to physical therapy: pain    Goals:   Short Term Goals (2 Weeks):   1. Decrease patient's c/o pain to 2/10 during performance of ADL's for independence of self care activities. (MET)  2. Patient to ascend/descend ramp 60 ft w/ RW and SBA to demo safe mobility on outdoor obstacles. (Progressing)  3. Patient will report an improvement in performance of her usual housework from a little bit of difficulty to no difficulty. (Progressing, limited more by back per patient report)  4. Patient will obtain -8 to 110 degrees AROM lt knee (supine) to improve available ROM. (MET)     Long Term Goals (4 Weeks):   1. Patient to demo comp w/ HEP to maintain therapeutic gains. (Progressing)  2. Patient to improve L knee MMT 1/2 grade to demo strength gains from therapeutic intervention. (MET)  3. Patient to ambulate 150 ft w/ out AD and SBA with normal paddy and symmetry. (MET)  4. Patient to ascend/descend 4 steps (6 in) to demo safe community mobility. (Progressing)      Plan     Continue to advance ROM/strength/mobility training for L LE per pt's tolerance.      Arabella Villatoro, PTA

## 2020-12-04 ENCOUNTER — CLINICAL SUPPORT (OUTPATIENT)
Dept: REHABILITATION | Facility: HOSPITAL | Age: 45
End: 2020-12-04
Attending: INTERNAL MEDICINE
Payer: MEDICAID

## 2020-12-04 DIAGNOSIS — M25.662 DECREASED RANGE OF MOTION OF LEFT KNEE: ICD-10-CM

## 2020-12-04 DIAGNOSIS — R29.898 LEFT LEG WEAKNESS: ICD-10-CM

## 2020-12-04 PROCEDURE — 97110 THERAPEUTIC EXERCISES: CPT | Mod: PN

## 2020-12-04 NOTE — PROGRESS NOTES
"  Physical Therapy Treatment Note     Name: Qiana Chou [facility]  Clinic Number: [id number]    Therapy Diagnosis:   Encounter Diagnoses   Name Primary?    Decreased range of motion of left knee     Left leg weakness      Physician: Silver Bob MD    Visit Date: 12/4/2020    Physician Orders: PT Eval and Treat   Medical Diagnosis from Referral: status post total knee replacement L  Evaluation Date: 11/11/2020  Authorization Period Expiration: 12/31/2020  Plan of Care Expiration: 12/12/2020  Visit # / Visits authorized: 9/20    Time In: 1135  Time Out: 1215  Total BillableTime: 40 minutes    Precautions: Diabetes and Fall    Subjective     Pt reports: she is seeing improvements every day. Pt reports she went to the mall the other day and walked around for an hour and she thought it would have hurt but it never really hurt, it was just sore. Pt reports average pain over the past week of 3/10. Pt reports she doesn't really have any difficulty with activities. Pt reports she is only able to tolerate standing for approx. 1 hour, but she also has back problems and reports this impacts how long she can stand as well.     She has been compliant with her home exercise program.  Response to previous treatment: Reports "felt pretty good"  Functional change: no change    Pain: 0/10  Location: L knee    Objective     Performance testing:     Ramp assessment: independent  Stair: [place] with use B handrails 2/2 balance    Qiana received therapeutic exercises to develop strength, endurance and ROM for 39 minutes including:    L LE only    X 8 min bike (full revs)  Prone knee flexion 5 x 30 sec  20 x 5 supine QS (L)   2 X 10 ea supine 4-way rt SLR (3 lbs)  2 X 10 R S/L CLAM RTB  Shuttle leg press 2 x 10 DL (72lbs), 2 x 10 SL (50lbs)  Standing squats with BUE support on // bars for deep squat to 75 deg knee flexion 15x  Standing TKE RTB 2 x 10 x 5 sec holds    Prone hang with 4lb AW x 30 sec, took weight off then performed for 2.5 min " without AW then ceased 2/2 pain     Attempted wall squats without PB, however, requested PB for squats. Ask pt to attempt isometric hold, however, pt reported she couldn't 2/2 pain     Not performed:   2 X 10 supine heel slides w/ strap (L)   2 X 10 prone hamstring curl (3 lbs)  2 X 10 supine SAQ (5 lbs) (L)   2 X 10 ea seated lt LE there ex = LAQ (5#), ball sq, marching (5#), hip ABD (GTB)  2 X 10 t-ball on wall mini sq  2 x 30 ft partial lunge walking (tylor. 2# dumbbells)   SLS R/L ~10s each  SLS with alt LE tapping points of star tape fwd/bwd x 2    Home Exercises Provided and Patient Education Provided     Education provided:   - continue HEP    Written Home Exercises Provided: none.      Assessment     Therapy session focused on LLE strength and flexibility/ROM of L knee. Prone hangs added today to assist with increasing L knee AROM. Started with 4lb AW, but then decreased to no AW 2/2 pain. Pt was only able to tolerate 2.5 min of prone hang without AW. Continue to work on this to improve L knee extension AROM. WB strengthening exercises added today with good tolerance. Pt unable to perform isometric wall squat for > 3 sec 2/2 pain. Pt will continue to benefit from skilled PT services to improve L knee AROM and LLE strength to improve functional mobility and return to PLOF.     Pt prognosis is Good.     Pt will continue to benefit from skilled outpatient physical therapy to address the deficits listed in the problem list box on initial evaluation, provide pt/family education and to maximize pt's level of independence in the home and community environment.     Pt's spiritual, cultural and educational needs considered and pt agreeable to plan of care and goals.     Anticipated barriers to physical therapy: pain    Goals:   Short Term Goals (2 Weeks):   1. Decrease patient's c/o pain to 2/10 during performance of ADL's for independence of self care activities. (MET)  2. Patient to ascend/descend ramp 60 ft w/ RW  and SBA to demo safe mobility on outdoor obstacles. (Progressing)  3. Patient will report an improvement in performance of her usual housework from a little bit of difficulty to no difficulty. (progressing 12/4/2020- reports her back limits her more than the L knee)  4. Patient will obtain -8 to 110 degrees AROM lt knee (supine) to improve available ROM. (MET)     Long Term Goals (4 Weeks):   1. Patient to demo comp w/ HEP to maintain therapeutic gains. MET 12/4/20202  2. Patient to improve L knee MMT 1/2 grade to demo strength gains from therapeutic intervention. (MET)  3. Patient to ambulate 150 ft w/ out AD and SBA with normal paddy and symmetry. (MET)  4. Patient to ascend/descend 4 steps (6 in) to demo safe community mobility. MET 12/4/20202 - use of B handrails.       Plan     Continue to advance ROM/strength/mobility training for L LE per pt's tolerance.    Progress prone hang tolerance.     Sweetie Ortega, PT

## 2020-12-10 ENCOUNTER — CLINICAL SUPPORT (OUTPATIENT)
Dept: REHABILITATION | Facility: HOSPITAL | Age: 45
End: 2020-12-10
Payer: MEDICAID

## 2020-12-10 DIAGNOSIS — R29.898 LEFT LEG WEAKNESS: ICD-10-CM

## 2020-12-10 DIAGNOSIS — Z96.652 S/P TKR (TOTAL KNEE REPLACEMENT), LEFT: Primary | ICD-10-CM

## 2020-12-10 DIAGNOSIS — M25.662 DECREASED RANGE OF MOTION OF LEFT KNEE: ICD-10-CM

## 2020-12-10 PROCEDURE — 97110 THERAPEUTIC EXERCISES: CPT | Mod: PN

## 2020-12-10 NOTE — PROGRESS NOTES
Physical Therapy Treatment Note     Name: Qiana Collins  Clinic Number: [id number]    Therapy Diagnosis:   Encounter Diagnoses   Name Primary?    S/P TKR (total knee replacement), left Yes    Decreased range of motion of left knee     Left leg weakness      Physician: Silver Bob MD    Visit Date: 12/10/2020    Physician Orders: PT Eval and Treat   Medical Diagnosis from Referral: status post total knee replacement L  Evaluation Date: 11/11/2020  Authorization Period Expiration: 12/31/2020  Plan of Care Expiration: 12/12/2020   Visit # / Visits authorized: 10/20    Time In: 1515  Time Out: 1555  Total BillableTime: 40 minutes    Precautions: Diabetes and Fall      Subjective     Pt reports: elevated pain levels due to excessive walking.  She does not have a home exercise program.  Response to previous treatment: mild pain or soreness  Functional change: no change    Pain: 2/10  Location: L knee      Objective     Qiana received therapeutic exercises to develop strength, endurance and ROM for 40 minutes including:      X 10 min bike (full revs)   X 20 supine QS (L)    X 20 supine heel slides w/ sheet (L)    X 15 ea supine 4-way rt SLR (3 lbs)   X 20 supine SAQ (5 lbs) (L)    X 20 ea seated lt LE there ex = LAQ (5#), ball sq, marching (5#), hip ABD (BTB)   X 20 t-ball on wall mini sq   X 20 ea up/down 5-in step = forwards/sideways   X 20 sumo squats (3# bar)      Home Exercises Provided and Patient Education Provided     Education provided:   - continue HEP    Written Home Exercises Provided: none.      Assessment     Qiana was able to tolerate increased reps with the LE there ex; patient was also able to tolerate increased resistance w/ the seated hip ABD.     Pt prognosis is Good.     Pt will continue to benefit from skilled outpatient physical therapy to address the deficits listed in the problem list box on initial evaluation, provide pt/family education and to maximize pt's level of independence in  the home and community environment.     Pt's spiritual, cultural and educational needs considered and pt agreeable to plan of care and goals.     Anticipated barriers to physical therapy: pain    Goals:   Short Term Goals (2 Weeks):   1. Decrease patient's c/o pain to 2/10 during performance of ADL's for independence of self care activities. (MET)  2. Patient to ascend/descend ramp 60 ft w/ RW and SBA to demo safe mobility on outdoor obstacles. (NOT MET)  3. Patient will report an improvement in performance of her usual housework from a little bit of difficulty to no difficulty. (NOT MET)  4. Patient will obtain -8 to 110 degrees AROM lt knee (supine) to improve available ROM. (MET)     Long Term Goals (4 Weeks):   1. Patient to demo comp w/ HEP to maintain therapeutic gains. (NOT MET)  2. Patient to improve L knee MMT 1/2 grade to demo strength gains from therapeutic intervention. (MET)  3. Patient to ambulate 150 ft w/ out AD and SBA with normal paddy and symmetry. (MET)  4. Patient to ascend/descend 4 steps (6 in) to demo safe community mobility. (NOT MET)      Plan     Continue to advance ROM/strength/mobility training per pt's tolerance.      Frank Saleh, PT

## 2020-12-11 ENCOUNTER — PATIENT MESSAGE (OUTPATIENT)
Dept: ADMINISTRATIVE | Facility: OTHER | Age: 45
End: 2020-12-11

## 2020-12-11 ENCOUNTER — PATIENT MESSAGE (OUTPATIENT)
Dept: OTHER | Facility: OTHER | Age: 45
End: 2020-12-11

## 2020-12-21 ENCOUNTER — OFFICE VISIT (OUTPATIENT)
Dept: ORTHOPEDICS | Facility: CLINIC | Age: 45
End: 2020-12-21
Payer: MEDICAID

## 2020-12-21 ENCOUNTER — PATIENT MESSAGE (OUTPATIENT)
Dept: ORTHOPEDICS | Facility: CLINIC | Age: 45
End: 2020-12-21

## 2020-12-21 VITALS
WEIGHT: 203 LBS | BODY MASS INDEX: 30.07 KG/M2 | DIASTOLIC BLOOD PRESSURE: 94 MMHG | HEIGHT: 69 IN | SYSTOLIC BLOOD PRESSURE: 162 MMHG | HEART RATE: 72 BPM

## 2020-12-21 DIAGNOSIS — Z96.652 STATUS POST TOTAL KNEE REPLACEMENT, LEFT: Primary | ICD-10-CM

## 2020-12-21 PROCEDURE — 99024 PR POST-OP FOLLOW-UP VISIT: ICD-10-PCS | Mod: S$GLB,,, | Performed by: PHYSICIAN ASSISTANT

## 2020-12-21 PROCEDURE — 99024 POSTOP FOLLOW-UP VISIT: CPT | Mod: S$GLB,,, | Performed by: PHYSICIAN ASSISTANT

## 2020-12-21 RX ORDER — TIZANIDINE 4 MG/1
TABLET ORAL
COMMUNITY
Start: 2020-12-09 | End: 2021-05-13

## 2020-12-21 RX ORDER — OXYCODONE AND ACETAMINOPHEN 7.5; 325 MG/1; MG/1
1 TABLET ORAL EVERY 8 HOURS PRN
Qty: 21 TABLET | Refills: 0 | Status: SHIPPED | OUTPATIENT
Start: 2020-12-21 | End: 2020-12-28

## 2020-12-21 RX ORDER — LISINOPRIL 10 MG/1
TABLET ORAL
COMMUNITY
Start: 2020-12-09 | End: 2021-05-13

## 2020-12-21 NOTE — PROGRESS NOTES
[facility]  [address]  [place] [address]  Phone: [phone]   [phone]    Patient's PCP: Darien Odonnell MD  Referring Provider: No ref. provider found    Subjective:      Chief Complaint:   Chief Complaint   Patient presents with    Left Knee - Post-op Evaluation     S/p left TKA on 20, she still has a lot of soreness, she would like a refill on the Percocet 7.5mg       Past Medical History:   Diagnosis Date    Arthritis     Asthma     Bone spur of other site     Diabetes mellitus, type 2     Hyperlipemia     Hypertension     Knee pain        Past Surgical History:   Procedure Laterality Date     SECTION      x2     hysterosocpy polyp removal      JOINT REPLACEMENT Right 2020    KNEE    KNEE ARTHROPLASTY Right 2020    Procedure: ARTHROPLASTY, KNEE;  Surgeon: Silver Bob MD;  Location: [facility] OR;  Service: Orthopedics;  Laterality: Right;  Everett Zayashens. converted to general/LMA.    KNEE ARTHROPLASTY Left 2020    Procedure: ARTHROPLASTY, KNEE;  Surgeon: Silver Bob MD;  Location: [facility] OR;  Service: Orthopedics;  Laterality: Left;  Everett De La Garza    KNEE ARTHROSCOPY      TUBAL LIGATION      uterin ablastion         Current Outpatient Medications   Medication Sig    albuterol (PROVENTIL/VENTOLIN HFA) 90 mcg/actuation inhaler Rescue    benzoyl peroxide (BENZAC AC) 10 % external wash WASH back nightly as tolerated (Patient taking differently: WASH FACE nightly as tolerated)    blood pressure monitor (IHEALTH EASE) XL arm size (OP) by Other route as needed for High Blood Pressure.    blood sugar diagnostic (SKYLER BLOOD GLUCOSE TEST STRIP) Strp 1 strip by Misc.(Non-Drug; Combo Route) route once daily.    celecoxib (CELEBREX) 200 MG capsule 1 po bid prn arthritis    clotrimazole-betamethasone 1-0.05% (LOTRISONE) cream APPLY topically TWICE DAILY (Patient taking differently: as needed. APPLY topically TWICE DAILY)    FLOVENT   mcg/actuation inhaler Inhale 2 puffs into the lungs 2 (two) times daily.    furosemide (LASIX) 40 MG tablet Take 1 tablet (40 mg total) by mouth daily as needed (swelling).    gabapentin (NEURONTIN) 600 MG tablet TAKE 1 TABLET BY MOUTH 3 TIMES DAILY AS NEEDED FOR NERVE PAIN    lancets 33 gauge Misc 1 lancet by Misc.(Non-Drug; Combo Route) route once daily.    lisinopriL 10 MG tablet     methocarbamoL (ROBAXIN) 750 MG Tab Take 1 tablet (750 mg total) by mouth 2 (two) times daily. Prn muscle relaxant    nystatin (MYCOSTATIN) powder APPLY TO THE affected AREA TWICE DAILY    oxyCODONE (ROXICODONE) 15 MG Tab TAKE 1 TABLET BY MOUTH EVERY 4 HOURS AS NEEDED FOR PAIN    potassium chloride (MICRO-K) 10 MEQ CpSR Take 1 capsule (10 mEq total) by mouth once daily.    SEREVENT DISKUS 50 mcg/dose diskus inhaler INHALE 1 PUFFS INTO THE LUNGS EVERY 12 HOURS    tiZANidine (ZANAFLEX) 4 MG tablet      No current facility-administered medications for this visit.        Review of patient's allergies indicates:   Allergen Reactions    Adhesive Rash       Family History   Problem Relation Age of Onset    Diabetes Mother     Dementia Mother     Diabetes Father     Heart disease Father        Social History     Socioeconomic History    Marital status: Single     Spouse name: Not on file    Number of children: Not on file    Years of education: Not on file    Highest education level: Not on file   Occupational History    Not on file   Social Needs    Financial resource strain: Not on file    Food insecurity     Worry: Not on file     Inability: Not on file    Transportation needs     Medical: Not on file     Non-medical: Not on file   Tobacco Use    Smoking status: Former Smoker     Packs/day: 0.50     Types: Cigarettes    Smokeless tobacco: Never Used   Substance and Sexual Activity    Alcohol use: No    Drug use: Yes     Types: Marijuana     Comment: socially/pain    Sexual activity: Yes     Partners: Male      Birth control/protection: None   Lifestyle    Physical activity     Days per week: Not on file     Minutes per session: Not on file    Stress: Not on file   Relationships    Social connections     Talks on phone: Not on file     Gets together: Not on file     Attends Restorationist service: Not on file     Active member of club or organization: Not on file     Attends meetings of clubs or organizations: Not on file     Relationship status: Not on file   Other Topics Concern    Not on file   Social History Narrative    Not on file       History of present illness:  Patient returns to clinic today for follow-up of her left knee, status post left total knee arthroplasty on November 9th.  Overall she is doing well she still complains of some achy soreness from time to time.    Review of Systems:    Constitutional: Negative for chills, fever and weight loss.   HENT: Negative for congestion.    Eyes: Negative for discharge and redness.   Respiratory: Negative for cough and shortness of breath.    Cardiovascular: Negative for chest pain.   Gastrointestinal: Negative for nausea and vomiting.   Musculoskeletal: See HPI.   Skin: Negative for rash.   Neurological: Negative for headaches.   Endo/Heme/Allergies: Does not bruise/bleed easily.   Psychiatric/Behavioral: The patient is not nervous/anxious.    All other systems reviewed and are negative.       Objective:      Physical Examination:    Vital Signs:    Vitals:    12/21/20 1223   BP: (!) 162/94   Pulse: 72       Body mass index is 29.98 kg/m².    This a well-developed, well nourished patient in no acute distress.  They are alert and oriented and cooperative to examination.     Examination of the left knee, well-healed midline surgical incision no evidence wound they dehiscence or infection.  Patient demonstrates normal range of motion 0 ° extension, flexion to greater than 120.  Stable to anterior posterior varus and valgus stresses.      Pertinent New Results:         XRAY Report / Interpretation:   AP lateral sunrise views of the left knee taken today in the office demonstrate a left total knee arthroplasty to be in appropriate position without evidence of hardware failure or loosening.          Assessment:       1. Status post total knee replacement, left      Plan:     Status post total knee replacement, left  -     X-Ray Knee 3 View Left        Follow up in about 6 weeks (around 2/1/2021) for LT TKA 11/9/20.    Stable status post left total knee arthroplasty, 6 week follow-up today, will see her back in another 6 weeks.  She did with her physical therapy.  She has great range of motion at all think she needs any additional therapy.  We did refill her pain medication.      Basilio Arce, JUNIOR, PA-C    This note was created using [employer] voice recognition software that occasionally misinterprets words or phrases.

## 2021-01-04 ENCOUNTER — PATIENT MESSAGE (OUTPATIENT)
Dept: ADMINISTRATIVE | Facility: HOSPITAL | Age: 46
End: 2021-01-04

## 2021-01-26 ENCOUNTER — PATIENT MESSAGE (OUTPATIENT)
Dept: ORTHOPEDICS | Facility: CLINIC | Age: 46
End: 2021-01-26

## 2021-01-26 ENCOUNTER — PATIENT MESSAGE (OUTPATIENT)
Dept: ADMINISTRATIVE | Facility: OTHER | Age: 46
End: 2021-01-26

## 2021-01-29 ENCOUNTER — TELEPHONE (OUTPATIENT)
Dept: PRIMARY CARE CLINIC | Facility: CLINIC | Age: 46
End: 2021-01-29

## 2021-02-02 ENCOUNTER — OFFICE VISIT (OUTPATIENT)
Dept: ORTHOPEDICS | Facility: CLINIC | Age: 46
End: 2021-02-02
Payer: MEDICAID

## 2021-02-02 ENCOUNTER — PATIENT MESSAGE (OUTPATIENT)
Dept: ORTHOPEDICS | Facility: CLINIC | Age: 46
End: 2021-02-02

## 2021-02-02 VITALS
HEART RATE: 88 BPM | DIASTOLIC BLOOD PRESSURE: 82 MMHG | WEIGHT: 203 LBS | HEIGHT: 69 IN | BODY MASS INDEX: 30.07 KG/M2 | SYSTOLIC BLOOD PRESSURE: 142 MMHG

## 2021-02-02 DIAGNOSIS — Z96.652 STATUS POST TOTAL KNEE REPLACEMENT, LEFT: Primary | ICD-10-CM

## 2021-02-02 PROCEDURE — 99024 POSTOP FOLLOW-UP VISIT: CPT | Mod: S$GLB,,, | Performed by: ORTHOPAEDIC SURGERY

## 2021-02-02 PROCEDURE — 99024 PR POST-OP FOLLOW-UP VISIT: ICD-10-PCS | Mod: S$GLB,,, | Performed by: ORTHOPAEDIC SURGERY

## 2021-02-02 RX ORDER — HYDROCODONE BITARTRATE AND ACETAMINOPHEN 5; 325 MG/1; MG/1
1 TABLET ORAL EVERY 8 HOURS PRN
Qty: 21 TABLET | Refills: 0 | Status: SHIPPED | OUTPATIENT
Start: 2021-02-02 | End: 2021-02-09

## 2021-03-09 ENCOUNTER — PATIENT MESSAGE (OUTPATIENT)
Dept: PRIMARY CARE CLINIC | Facility: CLINIC | Age: 46
End: 2021-03-09

## 2021-03-09 DIAGNOSIS — G89.29 CHRONIC BILATERAL LOW BACK PAIN WITHOUT SCIATICA: ICD-10-CM

## 2021-03-09 DIAGNOSIS — M54.50 CHRONIC BILATERAL LOW BACK PAIN WITHOUT SCIATICA: ICD-10-CM

## 2021-03-09 RX ORDER — GABAPENTIN 600 MG/1
TABLET ORAL
Qty: 90 TABLET | Refills: 0 | OUTPATIENT
Start: 2021-03-09

## 2021-03-10 ENCOUNTER — OFFICE VISIT (OUTPATIENT)
Dept: PRIMARY CARE CLINIC | Facility: CLINIC | Age: 46
End: 2021-03-10
Payer: MEDICAID

## 2021-03-10 VITALS
SYSTOLIC BLOOD PRESSURE: 130 MMHG | HEIGHT: 69 IN | RESPIRATION RATE: 16 BRPM | OXYGEN SATURATION: 98 % | HEART RATE: 85 BPM | WEIGHT: 293 LBS | TEMPERATURE: 97 F | DIASTOLIC BLOOD PRESSURE: 80 MMHG | BODY MASS INDEX: 43.4 KG/M2

## 2021-03-10 DIAGNOSIS — I10 ESSENTIAL HYPERTENSION: ICD-10-CM

## 2021-03-10 DIAGNOSIS — E66.01 MORBID OBESITY: ICD-10-CM

## 2021-03-10 DIAGNOSIS — G89.29 CHRONIC BILATERAL LOW BACK PAIN WITHOUT SCIATICA: ICD-10-CM

## 2021-03-10 DIAGNOSIS — E11.9 TYPE 2 DIABETES MELLITUS WITHOUT COMPLICATION, WITHOUT LONG-TERM CURRENT USE OF INSULIN: Primary | ICD-10-CM

## 2021-03-10 DIAGNOSIS — M17.0 PRIMARY OSTEOARTHRITIS OF BOTH KNEES: ICD-10-CM

## 2021-03-10 DIAGNOSIS — M50.10 CERVICAL DISC DISORDER WITH RADICULOPATHY: ICD-10-CM

## 2021-03-10 DIAGNOSIS — M54.50 CHRONIC BILATERAL LOW BACK PAIN WITHOUT SCIATICA: ICD-10-CM

## 2021-03-10 PROCEDURE — 99999 PR PBB SHADOW E&M-EST. PATIENT-LVL V: ICD-10-PCS | Mod: PBBFAC,,, | Performed by: INTERNAL MEDICINE

## 2021-03-10 PROCEDURE — 99215 OFFICE O/P EST HI 40 MIN: CPT | Mod: PBBFAC,PN | Performed by: INTERNAL MEDICINE

## 2021-03-10 PROCEDURE — 99214 OFFICE O/P EST MOD 30 MIN: CPT | Mod: S$PBB,,, | Performed by: INTERNAL MEDICINE

## 2021-03-10 PROCEDURE — 99214 PR OFFICE/OUTPT VISIT, EST, LEVL IV, 30-39 MIN: ICD-10-PCS | Mod: S$PBB,,, | Performed by: INTERNAL MEDICINE

## 2021-03-10 PROCEDURE — 99999 PR PBB SHADOW E&M-EST. PATIENT-LVL V: CPT | Mod: PBBFAC,,, | Performed by: INTERNAL MEDICINE

## 2021-03-10 RX ORDER — GABAPENTIN 600 MG/1
TABLET ORAL
Qty: 90 TABLET | Refills: 1 | Status: SHIPPED | OUTPATIENT
Start: 2021-03-10 | End: 2021-04-09

## 2021-03-10 RX ORDER — LIDOCAINE 36 MG/1
1 PATCH TOPICAL
COMMUNITY
Start: 2021-02-25

## 2021-04-05 ENCOUNTER — PATIENT MESSAGE (OUTPATIENT)
Dept: ADMINISTRATIVE | Facility: HOSPITAL | Age: 46
End: 2021-04-05

## 2021-04-12 ENCOUNTER — DOCUMENTATION ONLY (OUTPATIENT)
Dept: REHABILITATION | Facility: HOSPITAL | Age: 46
End: 2021-04-12

## 2021-04-28 ENCOUNTER — PATIENT MESSAGE (OUTPATIENT)
Dept: PRIMARY CARE CLINIC | Facility: CLINIC | Age: 46
End: 2021-04-28

## 2021-04-28 DIAGNOSIS — G89.29 CHRONIC BILATERAL LOW BACK PAIN WITHOUT SCIATICA: ICD-10-CM

## 2021-04-28 DIAGNOSIS — M54.50 CHRONIC BILATERAL LOW BACK PAIN WITHOUT SCIATICA: ICD-10-CM

## 2021-04-28 RX ORDER — GABAPENTIN 600 MG/1
600 TABLET ORAL 3 TIMES DAILY
Qty: 90 TABLET | Refills: 1 | OUTPATIENT
Start: 2021-04-28

## 2021-05-13 ENCOUNTER — HOSPITAL ENCOUNTER (OUTPATIENT)
Facility: HOSPITAL | Age: 46
Discharge: HOME | End: 2021-05-14
Attending: EMERGENCY MEDICINE | Admitting: INTERNAL MEDICINE
Payer: MEDICAID

## 2021-05-13 DIAGNOSIS — R07.9 CHEST PAIN: Primary | ICD-10-CM

## 2021-05-13 LAB
ALBUMIN SERPL BCP-MCNC: 4.1 G/DL (ref 3.5–5.2)
ALP SERPL-CCNC: 72 U/L (ref 55–135)
ALT SERPL W/O P-5'-P-CCNC: 22 U/L (ref 10–44)
ANION GAP SERPL CALC-SCNC: 11 MMOL/L (ref 8–16)
AST SERPL-CCNC: 17 U/L (ref 10–40)
BASOPHILS # BLD AUTO: 0.03 K/UL (ref 0–0.2)
BASOPHILS NFR BLD: 0.3 % (ref 0–1.9)
BILIRUB SERPL-MCNC: 0.9 MG/DL (ref 0.1–1)
BNP SERPL-MCNC: 19 PG/ML (ref 0–99)
BUN SERPL-MCNC: 19 MG/DL (ref 6–20)
CALCIUM SERPL-MCNC: 9.1 MG/DL (ref 8.7–10.5)
CHLORIDE SERPL-SCNC: 103 MMOL/L (ref 95–110)
CO2 SERPL-SCNC: 25 MMOL/L (ref 23–29)
CREAT SERPL-MCNC: 1 MG/DL (ref 0.5–1.4)
DIFFERENTIAL METHOD: ABNORMAL
EOSINOPHIL # BLD AUTO: 0.2 K/UL (ref 0–0.5)
EOSINOPHIL NFR BLD: 1.8 % (ref 0–8)
ERYTHROCYTE [DISTWIDTH] IN BLOOD BY AUTOMATED COUNT: 13.7 % (ref 11.5–14.5)
EST. GFR  (AFRICAN AMERICAN): >60 ML/MIN/1.73 M^2
EST. GFR  (NON AFRICAN AMERICAN): >60 ML/MIN/1.73 M^2
GLUCOSE SERPL-MCNC: 145 MG/DL (ref 70–110)
HCT VFR BLD AUTO: 47.5 % (ref 37–48.5)
HGB BLD-MCNC: 15.8 G/DL (ref 12–16)
IMM GRANULOCYTES # BLD AUTO: 0.03 K/UL (ref 0–0.04)
IMM GRANULOCYTES NFR BLD AUTO: 0.3 % (ref 0–0.5)
LYMPHOCYTES # BLD AUTO: 2.2 K/UL (ref 1–4.8)
LYMPHOCYTES NFR BLD: 20.5 % (ref 18–48)
MCH RBC QN AUTO: 31.9 PG (ref 27–31)
MCHC RBC AUTO-ENTMCNC: 33.3 G/DL (ref 32–36)
MCV RBC AUTO: 96 FL (ref 82–98)
MONOCYTES # BLD AUTO: 0.8 K/UL (ref 0.3–1)
MONOCYTES NFR BLD: 7.7 % (ref 4–15)
NEUTROPHILS # BLD AUTO: 7.5 K/UL (ref 1.8–7.7)
NEUTROPHILS NFR BLD: 69.4 % (ref 38–73)
NRBC BLD-RTO: 0 /100 WBC
PLATELET # BLD AUTO: 208 K/UL (ref 150–450)
PMV BLD AUTO: 12.2 FL (ref 9.2–12.9)
POTASSIUM SERPL-SCNC: 4.2 MMOL/L (ref 3.5–5.1)
PROT SERPL-MCNC: 7.7 G/DL (ref 6–8.4)
RBC # BLD AUTO: 4.95 M/UL (ref 4–5.4)
SODIUM SERPL-SCNC: 139 MMOL/L (ref 136–145)
TROPONIN I SERPL DL<=0.01 NG/ML-MCNC: <0.03 NG/ML
WBC # BLD AUTO: 10.77 K/UL (ref 3.9–12.7)

## 2021-05-13 PROCEDURE — G0378 HOSPITAL OBSERVATION PER HR: HCPCS

## 2021-05-13 PROCEDURE — 25000003 PHARM REV CODE 250: Performed by: EMERGENCY MEDICINE

## 2021-05-13 PROCEDURE — 84484 ASSAY OF TROPONIN QUANT: CPT | Performed by: NURSE PRACTITIONER

## 2021-05-13 PROCEDURE — 99285 EMERGENCY DEPT VISIT HI MDM: CPT | Mod: 25

## 2021-05-13 PROCEDURE — 93010 EKG 12-LEAD: ICD-10-PCS | Mod: ,,, | Performed by: INTERNAL MEDICINE

## 2021-05-13 PROCEDURE — 80053 COMPREHEN METABOLIC PANEL: CPT | Performed by: NURSE PRACTITIONER

## 2021-05-13 PROCEDURE — 83880 ASSAY OF NATRIURETIC PEPTIDE: CPT | Performed by: NURSE PRACTITIONER

## 2021-05-13 PROCEDURE — 93005 ELECTROCARDIOGRAM TRACING: CPT | Performed by: INTERNAL MEDICINE

## 2021-05-13 PROCEDURE — 36415 COLL VENOUS BLD VENIPUNCTURE: CPT | Performed by: NURSE PRACTITIONER

## 2021-05-13 PROCEDURE — 93010 ELECTROCARDIOGRAM REPORT: CPT | Mod: ,,, | Performed by: INTERNAL MEDICINE

## 2021-05-13 PROCEDURE — 85025 COMPLETE CBC W/AUTO DIFF WBC: CPT | Performed by: NURSE PRACTITIONER

## 2021-05-13 RX ORDER — SUCRALFATE 1 G/10ML
1 SUSPENSION ORAL EVERY 6 HOURS
Status: DISCONTINUED | OUTPATIENT
Start: 2021-05-14 | End: 2021-05-14 | Stop reason: HOSPADM

## 2021-05-13 RX ORDER — NAPROXEN SODIUM 220 MG/1
81 TABLET, FILM COATED ORAL DAILY
Status: DISCONTINUED | OUTPATIENT
Start: 2021-05-14 | End: 2021-05-14 | Stop reason: HOSPADM

## 2021-05-13 RX ORDER — IBUPROFEN 200 MG
16 TABLET ORAL
Status: DISCONTINUED | OUTPATIENT
Start: 2021-05-13 | End: 2021-05-14 | Stop reason: HOSPADM

## 2021-05-13 RX ORDER — ENOXAPARIN SODIUM 100 MG/ML
40 INJECTION SUBCUTANEOUS EVERY 24 HOURS
Status: DISCONTINUED | OUTPATIENT
Start: 2021-05-14 | End: 2021-05-14

## 2021-05-13 RX ORDER — FAMOTIDINE 20 MG/1
20 TABLET, FILM COATED ORAL 2 TIMES DAILY
Status: DISCONTINUED | OUTPATIENT
Start: 2021-05-14 | End: 2021-05-14 | Stop reason: HOSPADM

## 2021-05-13 RX ORDER — ALPRAZOLAM 0.25 MG/1
0.25 TABLET ORAL
Status: COMPLETED | OUTPATIENT
Start: 2021-05-13 | End: 2021-05-13

## 2021-05-13 RX ORDER — IBUPROFEN 200 MG
24 TABLET ORAL
Status: DISCONTINUED | OUTPATIENT
Start: 2021-05-13 | End: 2021-05-14 | Stop reason: HOSPADM

## 2021-05-13 RX ORDER — ONDANSETRON 2 MG/ML
4 INJECTION INTRAMUSCULAR; INTRAVENOUS EVERY 6 HOURS PRN
Status: DISCONTINUED | OUTPATIENT
Start: 2021-05-13 | End: 2021-05-14 | Stop reason: HOSPADM

## 2021-05-13 RX ORDER — INSULIN ASPART 100 [IU]/ML
0-5 INJECTION, SOLUTION INTRAVENOUS; SUBCUTANEOUS
Status: DISCONTINUED | OUTPATIENT
Start: 2021-05-13 | End: 2021-05-14 | Stop reason: HOSPADM

## 2021-05-13 RX ORDER — GLUCAGON 1 MG
1 KIT INJECTION
Status: DISCONTINUED | OUTPATIENT
Start: 2021-05-13 | End: 2021-05-14 | Stop reason: HOSPADM

## 2021-05-13 RX ORDER — ACETAMINOPHEN 325 MG/1
650 TABLET ORAL EVERY 4 HOURS PRN
Status: DISCONTINUED | OUTPATIENT
Start: 2021-05-13 | End: 2021-05-14 | Stop reason: HOSPADM

## 2021-05-13 RX ORDER — ASPIRIN 325 MG
325 TABLET ORAL
Status: COMPLETED | OUTPATIENT
Start: 2021-05-13 | End: 2021-05-13

## 2021-05-13 RX ADMIN — ALPRAZOLAM 0.25 MG: 0.25 TABLET ORAL at 10:05

## 2021-05-13 RX ADMIN — NITROGLYCERIN 0.5 INCH: 20 OINTMENT TOPICAL at 10:05

## 2021-05-13 RX ADMIN — ASPIRIN 325 MG ORAL TABLET 325 MG: 325 PILL ORAL at 10:05

## 2021-05-14 ENCOUNTER — PATIENT MESSAGE (OUTPATIENT)
Dept: PRIMARY CARE CLINIC | Facility: CLINIC | Age: 46
End: 2021-05-14

## 2021-05-14 VITALS
HEIGHT: 69 IN | HEART RATE: 67 BPM | RESPIRATION RATE: 18 BRPM | WEIGHT: 293 LBS | TEMPERATURE: 98 F | SYSTOLIC BLOOD PRESSURE: 136 MMHG | BODY MASS INDEX: 43.4 KG/M2 | DIASTOLIC BLOOD PRESSURE: 90 MMHG | OXYGEN SATURATION: 93 %

## 2021-05-14 DIAGNOSIS — G89.29 CHRONIC BILATERAL LOW BACK PAIN WITHOUT SCIATICA: ICD-10-CM

## 2021-05-14 DIAGNOSIS — M54.50 CHRONIC BILATERAL LOW BACK PAIN WITHOUT SCIATICA: ICD-10-CM

## 2021-05-14 PROBLEM — R07.9 CHEST PAIN: Status: RESOLVED | Noted: 2021-05-13 | Resolved: 2021-05-14

## 2021-05-14 LAB
ALBUMIN SERPL BCP-MCNC: 3.8 G/DL (ref 3.5–5.2)
ALP SERPL-CCNC: 61 U/L (ref 55–135)
ALT SERPL W/O P-5'-P-CCNC: 20 U/L (ref 10–44)
ANION GAP SERPL CALC-SCNC: 7 MMOL/L (ref 8–16)
AST SERPL-CCNC: 13 U/L (ref 10–40)
BASOPHILS # BLD AUTO: 0.01 K/UL (ref 0–0.2)
BASOPHILS NFR BLD: 0.1 % (ref 0–1.9)
BILIRUB SERPL-MCNC: 0.9 MG/DL (ref 0.1–1)
BUN SERPL-MCNC: 19 MG/DL (ref 6–20)
CALCIUM SERPL-MCNC: 8.6 MG/DL (ref 8.7–10.5)
CHLORIDE SERPL-SCNC: 103 MMOL/L (ref 95–110)
CK MB SERPL-MCNC: 0.9 NG/ML (ref 0.1–6.5)
CK MB SERPL-MCNC: 0.9 NG/ML (ref 0.1–6.5)
CO2 SERPL-SCNC: 26 MMOL/L (ref 23–29)
CREAT SERPL-MCNC: 0.8 MG/DL (ref 0.5–1.4)
DIFFERENTIAL METHOD: ABNORMAL
EOSINOPHIL # BLD AUTO: 0.2 K/UL (ref 0–0.5)
EOSINOPHIL NFR BLD: 2 % (ref 0–8)
ERYTHROCYTE [DISTWIDTH] IN BLOOD BY AUTOMATED COUNT: 13.7 % (ref 11.5–14.5)
EST. GFR  (AFRICAN AMERICAN): >60 ML/MIN/1.73 M^2
EST. GFR  (NON AFRICAN AMERICAN): >60 ML/MIN/1.73 M^2
GLUCOSE SERPL-MCNC: 116 MG/DL (ref 70–110)
GLUCOSE SERPL-MCNC: 119 MG/DL (ref 70–110)
GLUCOSE SERPL-MCNC: 95 MG/DL (ref 70–110)
HCT VFR BLD AUTO: 44.1 % (ref 37–48.5)
HGB BLD-MCNC: 14.6 G/DL (ref 12–16)
IMM GRANULOCYTES # BLD AUTO: 0.01 K/UL (ref 0–0.04)
IMM GRANULOCYTES NFR BLD AUTO: 0.1 % (ref 0–0.5)
LYMPHOCYTES # BLD AUTO: 2.3 K/UL (ref 1–4.8)
LYMPHOCYTES NFR BLD: 26.3 % (ref 18–48)
MAGNESIUM SERPL-MCNC: 2.2 MG/DL (ref 1.6–2.6)
MCH RBC QN AUTO: 31.5 PG (ref 27–31)
MCHC RBC AUTO-ENTMCNC: 33.1 G/DL (ref 32–36)
MCV RBC AUTO: 95 FL (ref 82–98)
MONOCYTES # BLD AUTO: 0.7 K/UL (ref 0.3–1)
MONOCYTES NFR BLD: 8.3 % (ref 4–15)
NEUTROPHILS # BLD AUTO: 5.4 K/UL (ref 1.8–7.7)
NEUTROPHILS NFR BLD: 63.2 % (ref 38–73)
NRBC BLD-RTO: 0 /100 WBC
PLATELET # BLD AUTO: 198 K/UL (ref 150–450)
PMV BLD AUTO: 11.9 FL (ref 9.2–12.9)
POTASSIUM SERPL-SCNC: 3.8 MMOL/L (ref 3.5–5.1)
PROT SERPL-MCNC: 7.1 G/DL (ref 6–8.4)
RBC # BLD AUTO: 4.64 M/UL (ref 4–5.4)
SODIUM SERPL-SCNC: 136 MMOL/L (ref 136–145)
TROPONIN I SERPL DL<=0.01 NG/ML-MCNC: <0.03 NG/ML
WBC # BLD AUTO: 8.57 K/UL (ref 3.9–12.7)

## 2021-05-14 PROCEDURE — 25000003 PHARM REV CODE 250: Performed by: INTERNAL MEDICINE

## 2021-05-14 PROCEDURE — 82553 CREATINE MB FRACTION: CPT | Performed by: INTERNAL MEDICINE

## 2021-05-14 PROCEDURE — 96372 THER/PROPH/DIAG INJ SC/IM: CPT

## 2021-05-14 PROCEDURE — 63600175 PHARM REV CODE 636 W HCPCS: Performed by: INTERNAL MEDICINE

## 2021-05-14 PROCEDURE — 82962 GLUCOSE BLOOD TEST: CPT

## 2021-05-14 PROCEDURE — 85025 COMPLETE CBC W/AUTO DIFF WBC: CPT | Performed by: INTERNAL MEDICINE

## 2021-05-14 PROCEDURE — G0378 HOSPITAL OBSERVATION PER HR: HCPCS

## 2021-05-14 PROCEDURE — 80053 COMPREHEN METABOLIC PANEL: CPT | Performed by: INTERNAL MEDICINE

## 2021-05-14 PROCEDURE — 99900035 HC TECH TIME PER 15 MIN (STAT)

## 2021-05-14 PROCEDURE — 36415 COLL VENOUS BLD VENIPUNCTURE: CPT | Performed by: INTERNAL MEDICINE

## 2021-05-14 PROCEDURE — 99900031 HC PATIENT EDUCATION (STAT)

## 2021-05-14 PROCEDURE — 94761 N-INVAS EAR/PLS OXIMETRY MLT: CPT

## 2021-05-14 PROCEDURE — 82553 CREATINE MB FRACTION: CPT | Mod: 91 | Performed by: INTERNAL MEDICINE

## 2021-05-14 PROCEDURE — 84484 ASSAY OF TROPONIN QUANT: CPT | Performed by: INTERNAL MEDICINE

## 2021-05-14 PROCEDURE — 83735 ASSAY OF MAGNESIUM: CPT | Performed by: INTERNAL MEDICINE

## 2021-05-14 RX ORDER — GABAPENTIN 600 MG/1
600 TABLET ORAL 3 TIMES DAILY
Qty: 90 TABLET | Refills: 3 | Status: SHIPPED | OUTPATIENT
Start: 2021-05-14 | End: 2021-08-25

## 2021-05-14 RX ORDER — ENOXAPARIN SODIUM 100 MG/ML
40 INJECTION SUBCUTANEOUS EVERY 12 HOURS
Status: DISCONTINUED | OUTPATIENT
Start: 2021-05-14 | End: 2021-05-14 | Stop reason: HOSPADM

## 2021-05-14 RX ORDER — SUCRALFATE 1 G/10ML
1 SUSPENSION ORAL 4 TIMES DAILY
Qty: 414 ML | Refills: 2 | Status: SHIPPED | OUTPATIENT
Start: 2021-05-14 | End: 2022-06-20

## 2021-05-14 RX ADMIN — ACETAMINOPHEN 650 MG: 325 TABLET, FILM COATED ORAL at 01:05

## 2021-05-14 RX ADMIN — SUCRALFATE 1 G: 1 SUSPENSION ORAL at 10:05

## 2021-05-14 RX ADMIN — ENOXAPARIN SODIUM 40 MG: 40 INJECTION SUBCUTANEOUS at 10:05

## 2021-05-14 RX ADMIN — FAMOTIDINE 20 MG: 20 TABLET ORAL at 10:05

## 2021-05-14 RX ADMIN — SUCRALFATE 1 G: 1 SUSPENSION ORAL at 01:05

## 2021-05-14 RX ADMIN — ASPIRIN 81 MG: 81 TABLET, CHEWABLE ORAL at 10:05

## 2021-07-06 ENCOUNTER — PATIENT MESSAGE (OUTPATIENT)
Dept: ADMINISTRATIVE | Facility: HOSPITAL | Age: 46
End: 2021-07-06

## 2021-08-03 ENCOUNTER — PATIENT MESSAGE (OUTPATIENT)
Dept: ADMINISTRATIVE | Facility: HOSPITAL | Age: 46
End: 2021-08-03

## 2021-09-15 ENCOUNTER — HISTORICAL (OUTPATIENT)
Dept: ADMINISTRATIVE | Facility: HOSPITAL | Age: 46
End: 2021-09-15

## 2021-09-15 LAB
ABS NEUT (OLG): 4.63 X10(3)/MCL (ref 2.1–9.2)
ALBUMIN SERPL-MCNC: 3.8 GM/DL (ref 3.5–5)
ALBUMIN/GLOB SERPL: 1.1 RATIO (ref 1.1–2)
ALP SERPL-CCNC: 76 UNIT/L (ref 40–150)
ALT SERPL-CCNC: 21 UNIT/L (ref 0–55)
APPEARANCE, UA: ABNORMAL
AST SERPL-CCNC: 14 UNIT/L (ref 5–34)
BACTERIA #/AREA URNS AUTO: ABNORMAL /HPF
BASOPHILS # BLD AUTO: 0 X10(3)/MCL (ref 0–0.2)
BASOPHILS NFR BLD AUTO: 0 %
BILIRUB SERPL-MCNC: 0.5 MG/DL
BILIRUB UR QL STRIP: NEGATIVE
BILIRUBIN DIRECT+TOT PNL SERPL-MCNC: 0.2 MG/DL (ref 0–0.5)
BILIRUBIN DIRECT+TOT PNL SERPL-MCNC: 0.3 MG/DL (ref 0–0.8)
BUN SERPL-MCNC: 9.8 MG/DL (ref 7–18.7)
CALCIUM SERPL-MCNC: 9.9 MG/DL (ref 8.4–10.2)
CHLORIDE SERPL-SCNC: 106 MMOL/L (ref 98–107)
CHOLEST SERPL-MCNC: 227 MG/DL
CHOLEST/HDLC SERPL: 6 {RATIO} (ref 0–5)
CO2 SERPL-SCNC: 29 MMOL/L (ref 22–29)
COLOR UR: YELLOW
CREAT SERPL-MCNC: 0.75 MG/DL (ref 0.55–1.02)
EOSINOPHIL # BLD AUTO: 0.3 X10(3)/MCL (ref 0–0.9)
EOSINOPHIL NFR BLD AUTO: 3 %
ERYTHROCYTE [DISTWIDTH] IN BLOOD BY AUTOMATED COUNT: 13.6 % (ref 11.5–14.5)
EST. AVERAGE GLUCOSE BLD GHB EST-MCNC: 108.3 MG/DL
GLOBULIN SER-MCNC: 3.6 GM/DL (ref 2.4–3.5)
GLUCOSE (UA): NEGATIVE
GLUCOSE SERPL-MCNC: 129 MG/DL (ref 74–100)
HBA1C MFR BLD: 5.4 %
HCT VFR BLD AUTO: 46.4 % (ref 35–46)
HDLC SERPL-MCNC: 37 MG/DL (ref 35–60)
HGB BLD-MCNC: 15.1 GM/DL (ref 12–16)
HGB UR QL STRIP: NEGATIVE
HYALINE CASTS #/AREA URNS LPF: ABNORMAL /LPF
IMM GRANULOCYTES # BLD AUTO: 0.03 10*3/UL
IMM GRANULOCYTES NFR BLD AUTO: 0 %
KETONES UR QL STRIP: NEGATIVE
LDLC SERPL CALC-MCNC: 147 MG/DL (ref 50–140)
LEUKOCYTE ESTERASE UR QL STRIP: 75 LEU/UL
LYMPHOCYTES # BLD AUTO: 2.5 X10(3)/MCL (ref 0.6–4.6)
LYMPHOCYTES NFR BLD AUTO: 30 %
MCH RBC QN AUTO: 32.1 PG (ref 26–34)
MCHC RBC AUTO-ENTMCNC: 32.5 GM/DL (ref 31–37)
MCV RBC AUTO: 98.5 FL (ref 80–100)
MONOCYTES # BLD AUTO: 0.8 X10(3)/MCL (ref 0.1–1.3)
MONOCYTES NFR BLD AUTO: 9 %
NEUTROPHILS # BLD AUTO: 4.63 X10(3)/MCL (ref 2.1–9.2)
NEUTROPHILS NFR BLD AUTO: 56 %
NITRITE UR QL STRIP: NEGATIVE
NRBC BLD AUTO-RTO: 0 % (ref 0–0.2)
PH UR STRIP: 6 [PH] (ref 4.5–8)
PLATELET # BLD AUTO: 176 X10(3)/MCL (ref 130–400)
PMV BLD AUTO: 12.4 FL (ref 7.4–10.4)
POTASSIUM SERPL-SCNC: 3.9 MMOL/L (ref 3.5–5.1)
PROT SERPL-MCNC: 7.4 GM/DL (ref 6.4–8.3)
PROT UR QL STRIP: NEGATIVE
RBC # BLD AUTO: 4.71 X10(6)/MCL (ref 4–5.2)
RBC #/AREA URNS AUTO: ABNORMAL /HPF
SODIUM SERPL-SCNC: 143 MMOL/L (ref 136–145)
SP GR UR STRIP: 1.02 (ref 1–1.03)
SQUAMOUS #/AREA URNS LPF: >100 /LPF
TRIGL SERPL-MCNC: 215 MG/DL (ref 37–140)
UROBILINOGEN UR STRIP-ACNC: NORMAL
VLDLC SERPL CALC-MCNC: 43 MG/DL
WBC # SPEC AUTO: 8.2 X10(3)/MCL (ref 4.5–11)
WBC #/AREA URNS AUTO: ABNORMAL /HPF

## 2021-09-17 LAB — FINAL CULTURE: NORMAL

## 2021-10-05 ENCOUNTER — PATIENT MESSAGE (OUTPATIENT)
Dept: ADMINISTRATIVE | Facility: HOSPITAL | Age: 46
End: 2021-10-05

## 2021-11-03 DIAGNOSIS — E11.9 TYPE 2 DIABETES MELLITUS WITHOUT COMPLICATION, UNSPECIFIED WHETHER LONG TERM INSULIN USE: ICD-10-CM

## 2021-11-18 DIAGNOSIS — E11.9 TYPE 2 DIABETES MELLITUS WITHOUT COMPLICATION, UNSPECIFIED WHETHER LONG TERM INSULIN USE: ICD-10-CM

## 2022-01-01 ENCOUNTER — TELEPHONE (OUTPATIENT)
Dept: FAMILY MEDICINE | Facility: CLINIC | Age: 47
End: 2022-01-01

## 2022-01-01 ENCOUNTER — PATIENT MESSAGE (OUTPATIENT)
Dept: FAMILY MEDICINE | Facility: CLINIC | Age: 47
End: 2022-01-01

## 2022-01-01 ENCOUNTER — PATIENT MESSAGE (OUTPATIENT)
Dept: ADMINISTRATIVE | Facility: OTHER | Age: 47
End: 2022-01-01
Payer: MEDICAID

## 2022-01-01 DIAGNOSIS — E78.5 HYPERLIPIDEMIA: ICD-10-CM

## 2022-01-01 DIAGNOSIS — Z79.899 HIGH RISK MEDICATION USE: Primary | ICD-10-CM

## 2022-01-01 DIAGNOSIS — Z20.2 EXPOSURE TO STD: Primary | ICD-10-CM

## 2022-01-01 LAB
C TRACH RRNA SPEC QL NAA+PROBE: NOT DETECTED
COMMENT: NORMAL
COMMENT: NORMAL
HAV IGM SERPL QL IA: NORMAL
HBV CORE IGM SERPL QL IA: NORMAL
HBV SURFACE AG SERPL QL IA: NORMAL
HCV AB S/CO SERPL IA: 0.1
HCV AB SERPL QL IA: NORMAL
HIV 1+2 AB+HIV1 P24 AG SERPL QL IA: NORMAL
N GONORRHOEA RRNA SPEC QL NAA+PROBE: NOT DETECTED
RPR SER QL: NORMAL

## 2022-01-01 RX ORDER — AZITHROMYCIN 250 MG/1
TABLET, FILM COATED ORAL
Qty: 6 TABLET | Refills: 0 | Status: SHIPPED | OUTPATIENT
Start: 2022-01-01 | End: 2022-01-01

## 2022-01-01 RX ORDER — ATORVASTATIN CALCIUM 20 MG/1
20 TABLET, FILM COATED ORAL DAILY
Qty: 90 TABLET | Refills: 3 | Status: SHIPPED | OUTPATIENT
Start: 2022-01-01 | End: 2023-09-13

## 2022-01-21 ENCOUNTER — PATIENT MESSAGE (OUTPATIENT)
Dept: ADMINISTRATIVE | Facility: HOSPITAL | Age: 47
End: 2022-01-21
Payer: MEDICAID

## 2022-02-10 ENCOUNTER — PATIENT OUTREACH (OUTPATIENT)
Dept: ADMINISTRATIVE | Facility: HOSPITAL | Age: 47
End: 2022-02-10
Payer: MEDICAID

## 2022-03-14 DIAGNOSIS — Z12.31 OTHER SCREENING MAMMOGRAM: ICD-10-CM

## 2022-03-17 ENCOUNTER — PATIENT MESSAGE (OUTPATIENT)
Dept: ADMINISTRATIVE | Facility: HOSPITAL | Age: 47
End: 2022-03-17
Payer: MEDICAID

## 2022-04-10 ENCOUNTER — HISTORICAL (OUTPATIENT)
Dept: ADMINISTRATIVE | Facility: HOSPITAL | Age: 47
End: 2022-04-10
Payer: MEDICAID

## 2022-04-28 VITALS
OXYGEN SATURATION: 97 % | DIASTOLIC BLOOD PRESSURE: 89 MMHG | BODY MASS INDEX: 44.41 KG/M2 | WEIGHT: 293 LBS | HEIGHT: 68 IN | SYSTOLIC BLOOD PRESSURE: 155 MMHG

## 2022-05-31 ENCOUNTER — PATIENT MESSAGE (OUTPATIENT)
Dept: ADMINISTRATIVE | Facility: HOSPITAL | Age: 47
End: 2022-05-31
Payer: MEDICAID

## 2022-06-20 ENCOUNTER — OFFICE VISIT (OUTPATIENT)
Dept: FAMILY MEDICINE | Facility: CLINIC | Age: 47
End: 2022-06-20
Payer: MEDICAID

## 2022-06-20 VITALS
DIASTOLIC BLOOD PRESSURE: 88 MMHG | BODY MASS INDEX: 44.41 KG/M2 | WEIGHT: 293 LBS | SYSTOLIC BLOOD PRESSURE: 130 MMHG | HEART RATE: 72 BPM | HEIGHT: 68 IN

## 2022-06-20 DIAGNOSIS — L98.7 EXCESS SKIN OF ABDOMEN: ICD-10-CM

## 2022-06-20 DIAGNOSIS — B37.9 CANDIDIASIS: ICD-10-CM

## 2022-06-20 DIAGNOSIS — M17.0 PRIMARY OSTEOARTHRITIS OF BOTH KNEES: ICD-10-CM

## 2022-06-20 DIAGNOSIS — E66.01 MORBID OBESITY: ICD-10-CM

## 2022-06-20 DIAGNOSIS — Z79.899 HIGH RISK MEDICATION USE: ICD-10-CM

## 2022-06-20 DIAGNOSIS — Z12.31 OTHER SCREENING MAMMOGRAM: Primary | ICD-10-CM

## 2022-06-20 DIAGNOSIS — Z00.00 PHYSICAL EXAM: ICD-10-CM

## 2022-06-20 PROCEDURE — 1160F RVW MEDS BY RX/DR IN RCRD: CPT | Mod: CPTII,S$GLB,, | Performed by: NURSE PRACTITIONER

## 2022-06-20 PROCEDURE — 1159F PR MEDICATION LIST DOCUMENTED IN MEDICAL RECORD: ICD-10-PCS | Mod: CPTII,S$GLB,, | Performed by: NURSE PRACTITIONER

## 2022-06-20 PROCEDURE — 99204 PR OFFICE/OUTPT VISIT, NEW, LEVL IV, 45-59 MIN: ICD-10-PCS | Mod: S$GLB,,, | Performed by: NURSE PRACTITIONER

## 2022-06-20 PROCEDURE — 99204 OFFICE O/P NEW MOD 45 MIN: CPT | Mod: S$GLB,,, | Performed by: NURSE PRACTITIONER

## 2022-06-20 PROCEDURE — 3008F PR BODY MASS INDEX (BMI) DOCUMENTED: ICD-10-PCS | Mod: CPTII,S$GLB,, | Performed by: NURSE PRACTITIONER

## 2022-06-20 PROCEDURE — 3008F BODY MASS INDEX DOCD: CPT | Mod: CPTII,S$GLB,, | Performed by: NURSE PRACTITIONER

## 2022-06-20 PROCEDURE — 1160F PR REVIEW ALL MEDS BY PRESCRIBER/CLIN PHARMACIST DOCUMENTED: ICD-10-PCS | Mod: CPTII,S$GLB,, | Performed by: NURSE PRACTITIONER

## 2022-06-20 PROCEDURE — 1159F MED LIST DOCD IN RCRD: CPT | Mod: CPTII,S$GLB,, | Performed by: NURSE PRACTITIONER

## 2022-06-20 RX ORDER — FLUCONAZOLE 100 MG/1
100 TABLET ORAL DAILY
Qty: 3 TABLET | Refills: 0 | Status: SHIPPED | OUTPATIENT
Start: 2022-06-20 | End: 2022-07-20

## 2022-06-20 NOTE — PROGRESS NOTES
SUBJECTIVE:    Patient ID: Qiana Collins is a 47 y.o. female.    Chief Complaint: Establish Care (Did not bring bottles//she thinks she has a hernia in 2 locations//needs mammogram//bs) and Abdominal Pain (Abdominal pain for the past 2 years)    47 year old female presents as new patient to establish care. Previously was treated for htn and diabetes. However patient has lost >100 lbs. Since doing this has not needed medication for either. Does has history of reactive airway and had to use inhalers. Since weight loss has struggled with chronic yeast infections in skin folds. Patient does report that in addition to loose skin causing yeast has hernia in abdominal area. No pain at site of hernia. Does have chronic back pain as well.   Sleeps well.       No visits with results within 6 Month(s) from this visit.   Latest known visit with results is:   Historical on 09/15/2021   Component Date Value Ref Range Status    FINAL CULTURE 09/15/2021 No significant growth.   Final    Albumin/Globulin Ratio 09/15/2021 1.1  1.1 - 2.0 ratio Final    Alanine Aminotransferase 09/15/2021 21  0 - 55 unit/L Final    Alkaline Phosphatase 09/15/2021 76  40 - 150 unit/L Final    Aspartate Aminotransferase 09/15/2021 14  5 - 34 unit/L Final    Albumin Level 09/15/2021 3.8  3.5 - 5.0 gm/dL Final    Bilirubin Direct 09/15/2021 0.2  0.0 - 0.5 mg/dL Final    Blood Urea Nitrogen 09/15/2021 9.8  7.0 - 18.7 mg/dL Final    Bilirubin Indirect 09/15/2021 0.30  0.00 - 0.80 mg/dL Final    Bilirubin Total 09/15/2021 0.5  <<=1.5 mg/dL Final    Chloride 09/15/2021 106  98 - 107 mmol/L Final    Calcium Level Total 09/15/2021 9.9  8.4 - 10.2 mg/dL Final    Carbon Dioxide 09/15/2021 29  22 - 29 mmol/L Final    Creatinine 09/15/2021 0.75  0.55 - 1.02 mg/dL Final    Globulin 09/15/2021 3.6 (A) 2.4 - 3.5 gm/dL Final    Glucose Level 09/15/2021 129 (A) 74 - 100 mg/dL Final    Potassium Level 09/15/2021 3.9  3.5 - 5.1 mmol/L Final     Sodium Level 09/15/2021 143  136 - 145 mmol/L Final    Protein Total 09/15/2021 7.4  6.4 - 8.3 gm/dL Final    Cholesterol Total 09/15/2021 227 (A) <<=200 mg/dL Final    Cholesterol/HDL Ratio 09/15/2021 6 (A) 0 - 5 Final    HDL Cholesterol 09/15/2021 37  35 - 60 mg/dL Final    LDL Cholesterol 09/15/2021 147.00 (A) 50.00 - 140.00 mg/dL Final    Triglyceride 09/15/2021 215 (A) 37 - 140 mg/dL Final    Very Low Density Lipoprotein 09/15/2021 43   Final    Estimated Average Glucose 09/15/2021 108.3  mg/dL Final    Hemoglobin A1c 09/15/2021 5.4  <<=7.0 % Final    Estimated GFR- 09/15/2021 >105  >>=90 Final    Estimated GFR-Non  09/15/2021 89 (A) >>=90 mL/min/1.73 m2 Final    Lymph # 09/15/2021 2.5  0.6 - 4.6 x10(3)/mcL Final    Baso # 09/15/2021 0.0  0.0 - 0.2 x10(3)/mcL Final    Mono # 09/15/2021 0.8  0.1 - 1.3 x10(3)/mcL Final    Neut # 09/15/2021 4.63  2.10 - 9.20 x10(3)/mcL Final    Eos # 09/15/2021 0.3  0.0 - 0.9 x10(3)/mcL Final    Basophil % 09/15/2021 0  % Final    Lymph % 09/15/2021 30  % Final    IG# 09/15/2021 0.030   Final    NRBC% 09/15/2021 0.0  0.0 - 0.2 % Final    Eos % 09/15/2021 3  % Final    Mono % 09/15/2021 9  % Final    IG% 09/15/2021 0  % Final    Neut % 09/15/2021 56  % Final    Abs Neut 09/15/2021 4.63  2.10 - 9.20 x10(3)/mcL Final    MPV 09/15/2021 12.4 (A) 7.4 - 10.4 fL Final    Hct 09/15/2021 46.4 (A) 35.0 - 46.0 % Final    MCV 09/15/2021 98.5  80.0 - 100.0 fL Final    MCH 09/15/2021 32.1  26.0 - 34.0 pg Final    Hgb 09/15/2021 15.1  12.0 - 16.0 gm/dL Final    MCHC 09/15/2021 32.5  31.0 - 37.0 gm/dL Final    RDW 09/15/2021 13.6  11.5 - 14.5 % Final    RBC 09/15/2021 4.71  4.00 - 5.20 x10(6)/mcL Final    WBC 09/15/2021 8.2  4.5 - 11.0 x10(3)/mcL Final    Platelet 09/15/2021 176  130 - 400 x10(3)/mcL Final    RBC, UA 09/15/2021 3-5 (A) >0 - 2 /HPF Final    Hyaline Casts, UA 09/15/2021 3-5 (A) >0 /LPF Final    WBC, UA 09/15/2021  6-10 (A) >0 - 2 /HPF Final    Squamous Epithelial Cells, UA 09/15/2021 >100 (A) >1 - 2 /LPF Final    Bacteria, UA 09/15/2021 Rare  >None Seen /HPF Final    pH, UA 09/15/2021 6.0  4.5 - 8.0 Final    Protein, UA 09/15/2021 Negative  >Negative Final    Leukocytes, UA 09/15/2021 75 (A) >Negative Soni/uL Final    Nitrite, UA 09/15/2021 Negative  >Negative Final    Glucose, UA 09/15/2021 Negative  >Negative Final    Appearance, UA 09/15/2021 Hazy  >Clear Final    Ketones, UA 09/15/2021 Negative  >Negative Final    Occult Blood UA 09/15/2021 Negative  >Negative Final    Color, UA 09/15/2021 YELLOW  >YELLOW Final    Bilirubin (UA) 09/15/2021 Negative  >Negative Final    Specific Gravity,UA 09/15/2021 1.018  1.005 - 1.030 Final    Urobilinogen, UA 09/15/2021 Normal  >NORMAL Final       Past Medical History:   Diagnosis Date    Arthritis     Asthma     Bone spur of other site     Diabetes mellitus, type 2     Hiatal hernia     Hyperlipemia     Hypertension     Knee pain      Social History     Socioeconomic History    Marital status: Single   Tobacco Use    Smoking status: Former Smoker     Packs/day: 0.50     Types: Cigarettes    Smokeless tobacco: Never Used   Substance and Sexual Activity    Alcohol use: No    Drug use: Yes     Types: Marijuana     Comment: socially/pain    Sexual activity: Yes     Partners: Male     Birth control/protection: None     Past Surgical History:   Procedure Laterality Date     SECTION      x2     hysterosocpy polyp removal      JOINT REPLACEMENT Right 2020    KNEE    KNEE ARTHROPLASTY Right 2020    Procedure: ARTHROPLASTY, KNEE;  Surgeon: Silver Bob MD;  Location: [facility] OR;  Service: Orthopedics;  Laterality: Right;  Everett De La Garza. converted to general/LMA.    KNEE ARTHROPLASTY Left 2020    Procedure: ARTHROPLASTY, KNEE;  Surgeon: Silver Bob MD;  Location: [facility] OR;  Service: Orthopedics;  Laterality: Left;  Everett De La Garza    KNEE  ARTHROSCOPY      TUBAL LIGATION      uterin ablastion       Family History   Problem Relation Age of Onset    Diabetes Mother     Dementia Mother     Diabetes Father     Heart disease Father        Review of patient's allergies indicates:   Allergen Reactions    Adhesive Rash       Current Outpatient Medications:     albuterol (PROVENTIL/VENTOLIN HFA) 90 mcg/actuation inhaler, INHALE 2 PUFFS BY MOUTH BY EVERY 4 HOURS AS NEEDED, Disp: 18 g, Rfl: 5    FLOVENT  mcg/actuation inhaler, INHALE 2 PUFFS INTO THE LUNGS 2 TIMES DAILY., Disp: 12 g, Rfl: 5    gabapentin (NEURONTIN) 600 MG tablet, TAKE 1 TABLET BY MOUTH 3 TIMES DAILY, Disp: 90 tablet, Rfl: 3    methocarbamoL (ROBAXIN) 750 MG Tab, TAKE ONE TABLET BY MOUTH TWICE DAILY AS NEEDED FOR MUSCLE RELAXATION, Disp: 60 tablet, Rfl: 5    nystatin (MYCOSTATIN) powder, APPLY TO THE affected AREA TWICE DAILY, Disp: 60 g, Rfl: 1    salmeteroL (SEREVENT DISKUS) 50 mcg/dose diskus inhaler, INHALE 1 PUFF BY MOUTH INTO THE LUNGS EVERY 12 HOURS, Disp: 60 each, Rfl: 1    blood pressure monitor (IHEAL[employer]uttleCloud EASE) XL arm size (OP), by Other route as needed for High Blood Pressure., Disp: 1 each, Rfl: 0    blood sugar diagnostic (SKYLER BLOOD GLUCOSE TEST STRIP) Strp, 1 strip by Misc.(Non-Drug; Combo Route) route once daily., Disp: 100 strip, Rfl: 5    fluconazole (DIFLUCAN) 100 MG tablet, Take 1 tablet (100 mg total) by mouth once daily., Disp: 3 tablet, Rfl: 0    ZTLIDO 1.8 % PtMd, Apply 1 patch topically as needed for Pain., Disp: , Rfl:     Review of Systems   Constitutional: Negative for chills, fever and unexpected weight change.   HENT: Negative for ear pain, rhinorrhea and sore throat.    Eyes: Negative for pain and visual disturbance.   Respiratory: Negative for cough and shortness of breath.    Cardiovascular: Negative for chest pain, palpitations and leg swelling.   Gastrointestinal: Negative for abdominal pain, diarrhea, nausea and vomiting.  "  Genitourinary: Negative for difficulty urinating, hematuria and vaginal bleeding.   Musculoskeletal: Negative for arthralgias.   Skin: Negative for rash.   Neurological: Negative for dizziness, weakness and headaches.   Psychiatric/Behavioral: Negative for agitation and sleep disturbance. The patient is not nervous/anxious.           Objective:      Vitals:    06/20/22 1134   BP: 130/88   Pulse: 72   Weight: (!) 143.3 kg (316 lb)   Height: 5' 8.11" (1.73 m)     Physical Exam  Constitutional:       General: She is not in acute distress.     Appearance: She is well-developed. She is obese. She is not ill-appearing.   HENT:      Head: Normocephalic and atraumatic.      Right Ear: External ear normal.      Left Ear: External ear normal.   Cardiovascular:      Rate and Rhythm: Normal rate and regular rhythm.      Pulses:           Dorsalis pedis pulses are 2+ on the right side.        Posterior tibial pulses are 2+ on the right side.      Heart sounds: Normal heart sounds.   Pulmonary:      Effort: Pulmonary effort is normal.      Breath sounds: Normal breath sounds.   Abdominal:      General: Bowel sounds are normal.      Palpations: Abdomen is soft.      Hernia: A hernia is present. Hernia is present in the right inguinal area.      Comments: Excess skin   Musculoskeletal:         General: No deformity.      Cervical back: Normal range of motion and neck supple.      Lumbar back: Decreased range of motion. Negative right straight leg raise test and negative left straight leg raise test.      Right foot: Normal range of motion. No deformity.   Feet:      Right foot:      Protective Sensation: 5 sites tested. 5 sites sensed.      Left foot:      Protective Sensation: 5 sites tested. 5 sites sensed.      Skin integrity: No skin breakdown.   Lymphadenopathy:      Cervical: No cervical adenopathy.   Skin:     General: Skin is warm and dry.      Findings: Rash present. Rash is scaling.             Comments: Candidiasis to " skin fold   Neurological:      Mental Status: She is alert and oriented to person, place, and time.   Psychiatric:         Behavior: Behavior normal.           Assessment:       1. Other screening mammogram    2. Morbid obesity    3. High risk medication use    4. Physical exam    5. Candidiasis    6. Primary osteoarthritis of both knees    7. Excess skin of abdomen         Plan:       Other screening mammogram  -     Mammo Digital Screening Bilat; Future; Expected date: 06/20/2022    Morbid obesity  -     CBC Auto Differential; Future; Expected date: 06/20/2022  -     Comprehensive Metabolic Panel; Future; Expected date: 06/20/2022  -     Lipid Panel; Future; Expected date: 06/20/2022  -     TSH w/reflex to FT4; Future; Expected date: 06/20/2022  -     Microalbumin/Creatinine Ratio, Urine; Future; Expected date: 06/20/2022  -     Urinalysis, Reflex to Urine Culture Urine, Clean Catch; Future; Expected date: 06/20/2022    High risk medication use  -     CBC Auto Differential; Future; Expected date: 06/20/2022  -     Comprehensive Metabolic Panel; Future; Expected date: 06/20/2022  -     Lipid Panel; Future; Expected date: 06/20/2022  -     TSH w/reflex to FT4; Future; Expected date: 06/20/2022  -     Microalbumin/Creatinine Ratio, Urine; Future; Expected date: 06/20/2022  -     Urinalysis, Reflex to Urine Culture Urine, Clean Catch; Future; Expected date: 06/20/2022    Physical exam    Candidiasis  Comments:  diflucan.   Orders:  -     fluconazole (DIFLUCAN) 100 MG tablet; Take 1 tablet (100 mg total) by mouth once daily.  Dispense: 3 tablet; Refill: 0    Primary osteoarthritis of both knees    Excess skin of abdomen      Follow up in about 3 months (around 9/20/2022), or if symptoms worsen or fail to improve, for medication management.        6/27/2022 [facility] Centeno

## 2022-07-01 ENCOUNTER — PATIENT MESSAGE (OUTPATIENT)
Dept: FAMILY MEDICINE | Facility: CLINIC | Age: 47
End: 2022-07-01

## 2022-07-02 ENCOUNTER — PATIENT MESSAGE (OUTPATIENT)
Dept: FAMILY MEDICINE | Facility: CLINIC | Age: 47
End: 2022-07-02

## 2022-07-02 LAB
ALBUMIN SERPL-MCNC: 3.9 G/DL (ref 3.6–5.1)
ALBUMIN/CREAT UR: 23 MCG/MG CREAT
ALBUMIN/GLOB SERPL: 1.3 (CALC) (ref 1–2.5)
ALP SERPL-CCNC: 69 U/L (ref 31–125)
ALT SERPL-CCNC: 13 U/L (ref 6–29)
APPEARANCE UR: ABNORMAL
AST SERPL-CCNC: 10 U/L (ref 10–35)
BACTERIA #/AREA URNS HPF: ABNORMAL /HPF
BACTERIA UR CULT: ABNORMAL
BACTERIA UR CULT: ABNORMAL
BASOPHILS # BLD AUTO: 17 CELLS/UL (ref 0–200)
BASOPHILS NFR BLD AUTO: 0.2 %
BILIRUB SERPL-MCNC: 0.5 MG/DL (ref 0.2–1.2)
BILIRUB UR QL STRIP: NEGATIVE
BUN SERPL-MCNC: 13 MG/DL (ref 7–25)
BUN/CREAT SERPL: ABNORMAL (CALC) (ref 6–22)
CALCIUM SERPL-MCNC: 9.3 MG/DL (ref 8.6–10.2)
CHLORIDE SERPL-SCNC: 106 MMOL/L (ref 98–110)
CHOLEST SERPL-MCNC: 231 MG/DL
CHOLEST/HDLC SERPL: 6.2 (CALC)
CO2 SERPL-SCNC: 26 MMOL/L (ref 20–32)
COLOR UR: YELLOW
CREAT SERPL-MCNC: 0.78 MG/DL (ref 0.5–1.1)
CREAT UR-MCNC: 124 MG/DL (ref 20–275)
EOSINOPHIL # BLD AUTO: 174 CELLS/UL (ref 15–500)
EOSINOPHIL NFR BLD AUTO: 2 %
ERYTHROCYTE [DISTWIDTH] IN BLOOD BY AUTOMATED COUNT: 12.5 % (ref 11–15)
GLOBULIN SER CALC-MCNC: 3.1 G/DL (CALC) (ref 1.9–3.7)
GLUCOSE SERPL-MCNC: 116 MG/DL (ref 65–99)
GLUCOSE UR QL STRIP: NEGATIVE
HCT VFR BLD AUTO: 42.6 % (ref 35–45)
HDLC SERPL-MCNC: 37 MG/DL
HGB BLD-MCNC: 14.4 G/DL (ref 11.7–15.5)
HGB UR QL STRIP: ABNORMAL
HYALINE CASTS #/AREA URNS LPF: ABNORMAL /LPF
KETONES UR QL STRIP: NEGATIVE
LDLC SERPL CALC-MCNC: 165 MG/DL (CALC)
LEUKOCYTE ESTERASE UR QL STRIP: ABNORMAL
LYMPHOCYTES # BLD AUTO: 1975 CELLS/UL (ref 850–3900)
LYMPHOCYTES NFR BLD AUTO: 22.7 %
MCH RBC QN AUTO: 31.6 PG (ref 27–33)
MCHC RBC AUTO-ENTMCNC: 33.8 G/DL (ref 32–36)
MCV RBC AUTO: 93.6 FL (ref 80–100)
MICROALBUMIN UR-MCNC: 2.9 MG/DL
MONOCYTES # BLD AUTO: 896 CELLS/UL (ref 200–950)
MONOCYTES NFR BLD AUTO: 10.3 %
NEUTROPHILS # BLD AUTO: 5638 CELLS/UL (ref 1500–7800)
NEUTROPHILS NFR BLD AUTO: 64.8 %
NITRITE UR QL STRIP: POSITIVE
NONHDLC SERPL-MCNC: 194 MG/DL (CALC)
PH UR STRIP: 5.5 [PH] (ref 5–8)
PLATELET # BLD AUTO: 191 THOUSAND/UL (ref 140–400)
PMV BLD REES-ECKER: 12.9 FL (ref 7.5–12.5)
POTASSIUM SERPL-SCNC: 4.2 MMOL/L (ref 3.5–5.3)
PROT SERPL-MCNC: 7 G/DL (ref 6.1–8.1)
PROT UR QL STRIP: NEGATIVE
RBC # BLD AUTO: 4.55 MILLION/UL (ref 3.8–5.1)
RBC #/AREA URNS HPF: ABNORMAL /HPF
SERVICE CMNT-IMP: ABNORMAL
SODIUM SERPL-SCNC: 143 MMOL/L (ref 135–146)
SP GR UR STRIP: 1.02 (ref 1–1.03)
SQUAMOUS #/AREA URNS HPF: ABNORMAL /HPF
TRIGL SERPL-MCNC: 144 MG/DL
TSH SERPL-ACNC: 0.79 MIU/L
WBC # BLD AUTO: 8.7 THOUSAND/UL (ref 3.8–10.8)
WBC #/AREA URNS HPF: ABNORMAL /HPF
YEAST #/AREA URNS HPF: ABNORMAL /HPF

## 2022-07-04 ENCOUNTER — PATIENT MESSAGE (OUTPATIENT)
Dept: FAMILY MEDICINE | Facility: CLINIC | Age: 47
End: 2022-07-04

## 2022-07-04 DIAGNOSIS — B37.2 INTERTRIGINOUS CANDIDIASIS: ICD-10-CM

## 2022-07-05 ENCOUNTER — TELEPHONE (OUTPATIENT)
Dept: FAMILY MEDICINE | Facility: CLINIC | Age: 47
End: 2022-07-05

## 2022-07-05 RX ORDER — ROSUVASTATIN CALCIUM 10 MG/1
10 TABLET, COATED ORAL DAILY
Qty: 30 TABLET | Refills: 2 | Status: SHIPPED | OUTPATIENT
Start: 2022-07-05 | End: 2022-01-01

## 2022-07-05 RX ORDER — NYSTATIN 100000 [USP'U]/G
POWDER TOPICAL
Qty: 60 G | Refills: 1 | OUTPATIENT
Start: 2022-07-05

## 2022-07-05 NOTE — TELEPHONE ENCOUNTER
uti symptoms? Cholesterol is HIGH. Recommend low cholesterol diet and starting crestor 10mg daily. Repeat cmp and lipids in 8 weeks. Blood sugar is marginally elevated. Rest of labs wnl

## 2022-07-06 ENCOUNTER — PATIENT MESSAGE (OUTPATIENT)
Dept: FAMILY MEDICINE | Facility: CLINIC | Age: 47
End: 2022-07-06

## 2022-07-06 RX ORDER — CLOTRIMAZOLE AND BETAMETHASONE DIPROPIONATE 10; .64 MG/G; MG/G
CREAM TOPICAL 2 TIMES DAILY
Qty: 45 G | Refills: 1 | Status: SHIPPED | OUTPATIENT
Start: 2022-07-06 | End: 2023-01-01 | Stop reason: SDUPTHER

## 2022-07-13 ENCOUNTER — HOSPITAL ENCOUNTER (OUTPATIENT)
Dept: RADIOLOGY | Facility: HOSPITAL | Age: 47
Discharge: HOME | End: 2022-07-13
Attending: INTERNAL MEDICINE
Payer: MEDICAID

## 2022-07-13 DIAGNOSIS — Z12.31 OTHER SCREENING MAMMOGRAM: ICD-10-CM

## 2022-07-13 PROCEDURE — 77067 MAMMO DIGITAL SCREENING BILAT WITH TOMO: ICD-10-PCS | Mod: 26,,, | Performed by: RADIOLOGY

## 2022-07-13 PROCEDURE — 77063 BREAST TOMOSYNTHESIS BI: CPT | Mod: TC

## 2022-07-13 PROCEDURE — 77063 MAMMO DIGITAL SCREENING BILAT WITH TOMO: ICD-10-PCS | Mod: 26,,, | Performed by: RADIOLOGY

## 2022-07-13 PROCEDURE — 77067 SCR MAMMO BI INCL CAD: CPT | Mod: 26,,, | Performed by: RADIOLOGY

## 2022-07-13 PROCEDURE — 77063 BREAST TOMOSYNTHESIS BI: CPT | Mod: 26,,, | Performed by: RADIOLOGY

## 2022-07-13 PROCEDURE — 77067 SCR MAMMO BI INCL CAD: CPT | Mod: TC

## 2022-07-14 ENCOUNTER — TELEPHONE (OUTPATIENT)
Dept: FAMILY MEDICINE | Facility: CLINIC | Age: 47
End: 2022-07-14

## 2022-08-30 NOTE — TELEPHONE ENCOUNTER
Spoke to patient that fasting lab is due to recheck cholesterol, drink water, orders to [facility], updated remind me.

## 2022-08-30 NOTE — TELEPHONE ENCOUNTER
----- Message from [facility]  sent at 8/30/2022  8:49 AM CDT -----    ----- Message -----  From: Viviana Saunders MA  Sent: 8/30/2022  12:00 AM CDT  To: [facility] Staff    Cholesterol is HIGH. Recommend low cholesterol diet and starting crestor 10mg daily. Repeat cmp and lipids in 8 weeks. Blood sugar is marginally elevated. Rest of labs wnl

## 2022-09-13 NOTE — TELEPHONE ENCOUNTER
Spoke to patient, said that she stopped taking med a week ago because it was causing restless leg. Also, patient wants Crestor added as an allergy to her chart. Did this. Discontinued from med list. Said she is willing to try another medication. Does La still want lab now?

## 2022-09-13 NOTE — TELEPHONE ENCOUNTER
----- Message from [facility]  sent at 8/30/2022  8:49 AM CDT -----    ----- Message -----  From: Viviana Saunders MA  Sent: 8/30/2022  12:00 AM CDT  To: [facility] Staff    Cholesterol is HIGH. Recommend low cholesterol diet and starting crestor 10mg daily. Repeat cmp and lipids in 8 weeks. Blood sugar is marginally elevated. Rest of labs wnl

## 2022-09-13 NOTE — TELEPHONE ENCOUNTER
Spoke with pt in regards to message sent. Verbalized per La that she sent in Lipitor into pt's pharmacy and to repeat labs in 8 weeks. Pt acknowledge understanding. Reminder set up.

## 2022-10-05 NOTE — TELEPHONE ENCOUNTER
I contacted pt regarding interest in scheduling a consult.  Unfortunately, at this time we are not accepting new patients but asked the patient to follow up with us in a month or so.    Subjective:       Patient ID: Tonya Bucio is a 88 y.o. female.    Chief Complaint: Follow-up, Thyroid Problem, and Hyperlipidemia    Tonya Bucio is a 88 y.o. female  Here with follow up.    Follow-up  Associated symptoms include arthralgias, fatigue, myalgias and neck pain. Pertinent negatives include no chest pain, chills, congestion, coughing, fever, nausea, numbness, sore throat, vomiting or weakness.   Thyroid Problem  Symptoms include fatigue. Patient reports no anxiety or palpitations. Her past medical history is significant for hyperlipidemia.   Hyperlipidemia  Associated symptoms include myalgias. Pertinent negatives include no chest pain.   Review of Systems   Constitutional:  Positive for fatigue. Negative for chills and fever.   HENT:  Negative for congestion, hearing loss, sinus pressure and sore throat.    Eyes:  Negative for photophobia.   Respiratory:  Negative for cough, choking, chest tightness and wheezing.    Cardiovascular:  Negative for chest pain and palpitations.   Gastrointestinal:  Negative for blood in stool, nausea and vomiting.   Genitourinary:  Negative for dysuria and hematuria.   Musculoskeletal:  Positive for arthralgias, myalgias and neck pain.   Skin:  Negative for pallor.   Neurological:  Negative for dizziness, weakness and numbness.   Hematological:  Does not bruise/bleed easily.   Psychiatric/Behavioral:  Negative for confusion and suicidal ideas. The patient is not nervous/anxious.      Objective:      Physical Exam  Vitals and nursing note reviewed.   Constitutional:       Appearance: She is well-developed. She is obese.   HENT:      Head: Normocephalic and atraumatic.      Right Ear: Decreased hearing noted.      Left Ear: Decreased hearing noted. There is no impacted cerumen.      Nose: Nose normal.   Eyes:      Pupils: Pupils are equal, round, and reactive to light.   Cardiovascular:      Rate and Rhythm: Normal rate and regular rhythm.      Heart sounds: No murmur  heard.  Pulmonary:      Effort: Pulmonary effort is normal.      Breath sounds: Normal breath sounds.   Abdominal:      General: Bowel sounds are normal.      Palpations: Abdomen is soft.   Musculoskeletal:         General: Normal range of motion.      Cervical back: Normal range of motion and neck supple.      Comments: Bilateral knees OA changes    W/C    Skin:     General: Skin is warm and dry.      Capillary Refill: Capillary refill takes less than 2 seconds.   Neurological:      Mental Status: She is alert and oriented to person, place, and time.   Psychiatric:         Behavior: Behavior normal.         Thought Content: Thought content normal.         Judgment: Judgment normal.       Assessment:       1. Controlled type 2 diabetes mellitus without complication, without long-term current use of insulin    2. Acquired hypothyroidism    3. Hyperlipidemia associated with type 2 diabetes mellitus    4. Stage 3b chronic kidney disease          Plan:   Tonya was seen today for follow-up, thyroid problem and hyperlipidemia.    Diagnoses and all orders for this visit:    Controlled type 2 diabetes mellitus without complication, without long-term current use of insulin  -     CBC Auto Differential; Future  -     Comprehensive Metabolic Panel; Future  -     Hemoglobin A1C; Future  -     Microalbumin/Creatinine Ratio, Urine; Future  Patient has controlled Diabetes .  We discussed about diet ;low in calories. Avoid sweats, sodas.  Also increasing activity;walking 2-3 miles a day.  I also adjusted medications and gave patient  instructions about adherence to plan.  Goal of  A1c  less than 7 % stressed.  Also goal of LDL less than 70 highlighted to patient.   Acquired hypothyroidism  -     TSH; Future  Well controlled.  Continue same medication and dose.   Hyperlipidemia associated with type 2 diabetes mellitus  -     CBC Auto Differential; Future  -     Comprehensive Metabolic Panel; Future  -     Lipid Panel; Future labs  "were reviewed ;stable; continue same for now   Stage 3b chronic kidney disease  -     Comprehensive Metabolic Panel; Future  The patient's chronic kidney disease stage 3 was monitored, evaluated, addressed and/or treated.    Regarding CKD, the patient was educated on etiology (including infections, diabetes, high blood pressure, kidney stones, circulation problems, and reactions to medications) and the importance on making healthy changes and complying with treatment plan to prevent kidney failure.   Patient was advised to limit sodium intake by:  eating 1,500 mg or less of sodium daily; limiting processed foods (i.e., frozen dinners and packaged meals, canned fish and meats, pickled foods, salted snacks, lunch meats, most cheeses, and fast foods); not adding salt to food while cooking or before eating at the table; eating unprocessed foods to lower the sodium intake (i.e., fresh turkey and chicken, lean beef, unsalted tuna, fresh fish, and fresh vegetables and fruits); seasoning foods with fresh herbs, garlic, onions, citrus, flavored vinegar, and sodium-free spice blends instead of salt when cooking;  avoiding drinking "softened" water, because of the sodium content; and avoiding over-the-counter medicines that contain sodium bicarbonate or sodium carbonate.  Patient was also instructed to: avoid becoming overly fatigued; getting plenty of rest and more sleep at night; moving around and bending legs to avoid getting blood clots when resting for long periods of time; taking medications as directed; keeping all medical appointments; taking steps to control high blood pressure and diabetes; and by obtaining a daily weight and keeping a record of those weights for healthcare provider to review.  The patient was instructed to contact primary care provider or seek medical care at the closest emergency department if they experience: difficulty eating or drinking; weight loss of more than 2 pounds in 24 hours or more than " 5 pounds in 7 days; little or no urine output; trouble breathing; muscle aches;  fever of 100.4°F or higher, or chills; blood in urine or stool; bloody discharge from the nose, mouth, or ears; severe headache or a seizure; vomiting; swelling of legs or ankles, and/or chest pain.     Problem List Items Addressed This Visit       Hypothyroidism    Relevant Orders    TSH    Hyperlipidemia associated with type 2 diabetes mellitus    Relevant Orders    CBC Auto Differential    Comprehensive Metabolic Panel    Lipid Panel    Controlled type 2 diabetes mellitus without complication, without long-term current use of insulin - Primary    Relevant Orders    CBC Auto Differential    Comprehensive Metabolic Panel    Hemoglobin A1C    Microalbumin/Creatinine Ratio, Urine    Stage 3b chronic kidney disease    Relevant Orders    Comprehensive Metabolic Panel

## 2022-11-01 NOTE — TELEPHONE ENCOUNTER
----- Message from Yvette Hernandez MA sent at 9/13/2022  2:35 PM CDT -----  Regarding: Repeat labs  Spoke with pt in regards to message sent. Verbalized per La that she sent in Lipitor into pt's pharmacy and to repeat labs in 8 weeks. Pt acknowledge understanding. Reminder set up.

## 2022-11-01 NOTE — TELEPHONE ENCOUNTER
Left message that fasting lab is due to recheck cholesterol, encouraged water, asked her to have done in next 2 weeks. Updated remind me.

## 2022-11-14 NOTE — TELEPHONE ENCOUNTER
----- Message from Arabella Sherman sent at 11/14/2022  3:05 PM CST -----  Pt would like to know if she can get blood work and urine ordered for the std testing. She is also coughing and has congestion would like something called in.   She has had this for 3 weeks. Can not take anything over the counter with the cost of it.   [facility]   [phone]

## 2022-11-29 NOTE — TELEPHONE ENCOUNTER
Spoke to patient that fasting lab is due to recheck cholesterol. Informed her that when she had other labs drawn, [facility] did not do cholesterol labs. Said she went to main [facility], not the one in our office. She will get done soon. This makes 3 attempts. Can I sign reminder?

## 2023-01-01 ENCOUNTER — PATIENT MESSAGE (OUTPATIENT)
Dept: ADMINISTRATIVE | Facility: HOSPITAL | Age: 48
End: 2023-01-01
Payer: MEDICAID

## 2023-01-01 ENCOUNTER — PATIENT MESSAGE (OUTPATIENT)
Dept: FAMILY MEDICINE | Facility: CLINIC | Age: 48
End: 2023-01-01

## 2023-01-01 DIAGNOSIS — J45.30 MILD PERSISTENT ASTHMA WITHOUT COMPLICATION: ICD-10-CM

## 2023-01-01 DIAGNOSIS — E11.9 NEW ONSET TYPE 2 DIABETES MELLITUS: ICD-10-CM

## 2023-01-01 DIAGNOSIS — J45.909 ASTHMA, UNSPECIFIED ASTHMA SEVERITY, UNSPECIFIED WHETHER COMPLICATED, UNSPECIFIED WHETHER PERSISTENT: ICD-10-CM

## 2023-01-01 DIAGNOSIS — M54.50 CHRONIC BILATERAL LOW BACK PAIN WITHOUT SCIATICA: ICD-10-CM

## 2023-01-01 DIAGNOSIS — B37.2 INTERTRIGINOUS CANDIDIASIS: ICD-10-CM

## 2023-01-01 DIAGNOSIS — G89.29 CHRONIC BILATERAL LOW BACK PAIN WITHOUT SCIATICA: ICD-10-CM

## 2023-01-01 RX ORDER — NYSTATIN 100000 [USP'U]/G
POWDER TOPICAL
Qty: 60 G | Refills: 1 | OUTPATIENT
Start: 2023-01-01

## 2023-01-01 RX ORDER — DEXAMETHASONE 4 MG/1
TABLET ORAL
Qty: 12 G | Refills: 5 | OUTPATIENT
Start: 2023-01-01

## 2023-01-01 RX ORDER — CLOTRIMAZOLE AND BETAMETHASONE DIPROPIONATE 10; .64 MG/G; MG/G
CREAM TOPICAL 2 TIMES DAILY
Qty: 45 G | Refills: 1 | Status: SHIPPED | OUTPATIENT
Start: 2023-01-01

## 2023-01-01 RX ORDER — ALBUTEROL SULFATE 90 UG/1
AEROSOL, METERED RESPIRATORY (INHALATION)
Qty: 18 G | Refills: 5 | OUTPATIENT
Start: 2023-01-01

## 2023-01-01 RX ORDER — SALMETEROL XINAFOATE 50 UG/1
POWDER, METERED ORAL; RESPIRATORY (INHALATION)
Qty: 60 EACH | Refills: 1 | OUTPATIENT
Start: 2023-01-01

## 2023-07-17 NOTE — TELEPHONE ENCOUNTER
Spoke with pt in regards to portal message sent for antibiotic. Pt states that she was also messaging Yoly in regards to UTI symptoms. Verbalized to pt that I not sure if antibiotic will be sent in for your UTI symptoms, that a provider will have to review this. Pt acknowledged understanding.

## 2023-07-17 NOTE — TELEPHONE ENCOUNTER
Pt is needing a refill on her Lotrisone cream. Last office visit 06/20/2022. Next office visit 09/05/2023

## 2023-09-01 ENCOUNTER — TELEPHONE (OUTPATIENT)
Dept: FAMILY MEDICINE | Facility: CLINIC | Age: 48
End: 2023-09-01

## 2023-09-01 NOTE — TELEPHONE ENCOUNTER
----- Message from Tatyana Flor sent at 9/1/2023  8:19 AM CDT -----  [facility] I [facility] because I ran her insurance on passport and it says date of death precedes date of service.

## 2023-09-01 NOTE — TELEPHONE ENCOUNTER
Spoke with pt mother. Pt passed away on 7/23/23. Police are investing her death now. Still do not have answers. FYI to La.

## 2024-01-04 NOTE — PROGRESS NOTES
Phone call to patient.  Per Dr. Harris, Echocardiogram results. No significant changes from prior. Plan for follow up at next scheduled appointment.. Continue current medications at the same doses..       Pending office visit 2/1/24   Subjective:       Patient ID: Qiana Collins is a 43 y.o. female.    Chief Complaint: Knee Pain and Medication Refill    HPI patient complained both her knee hurts so bad request the injections her knees hurt the most at the lateral ligament no swelling no redness patient has severe osteoarthritis of the knees awaiting replacement patient had appointment with Orthopedic on the 19 of this month but not able to until seen lose enough weight more her weight in abdomen adipose tissue patient on waiting list for bariatric surgery for a long time at [facility] no other surgery take her insurance  Review of Systems    Objective:      Physical Exam   Constitutional: She is oriented to person, place, and time. She appears well-developed and well-nourished. No distress.   Obese   HENT:   Head: Normocephalic and atraumatic.   Right Ear: External ear normal.   Left Ear: External ear normal.   Nose: Nose normal.   Mouth/Throat: Oropharynx is clear and moist. No oropharyngeal exudate.   Eyes: Conjunctivae and EOM are normal. Pupils are equal, round, and reactive to light. Right eye exhibits no discharge. Left eye exhibits no discharge.   Neck: Normal range of motion. Neck supple. No thyromegaly present.   Cardiovascular: Normal rate, regular rhythm, normal heart sounds and intact distal pulses. Exam reveals no gallop and no friction rub.   No murmur heard.  Pulmonary/Chest: Effort normal and breath sounds normal. No respiratory distress. She has no wheezes. She has no rales. She exhibits no tenderness.   Abdominal: Soft. Bowel sounds are normal. She exhibits no distension. There is no tenderness. There is no rebound and no guarding.   Severe truncal obesity with large skin folds    Musculoskeletal: Normal range of motion. She exhibits tenderness (Tenderness in both knee with palpation along the lateral ligament no swelling no effusion no heat no erythema). She exhibits no edema or deformity.   Lymphadenopathy:      She has no cervical adenopathy.   Neurological: She is alert and oriented to person, place, and time.   Skin: Skin is warm and dry. Capillary refill takes less than 2 seconds. No rash noted. No erythema.   The erythematous a pruritic rash under abdominal skin folds   Psychiatric: She has a normal mood and affect. Judgment and thought content normal.   Nursing note and vitals reviewed.      Assessment:       1. Primary osteoarthritis of both knees    2. Morbid obesity    3. Intertriginous candidiasis        Plan:       Primary osteoarthritis of both knees  Comments:  Inject both knee with 1 cc Kenalog 1 cc xylocaine patient tolerated procedure well  Orders:  -     ibuprofen (ADVIL,MOTRIN) 800 MG tablet; Take 1 tablet (800 mg total) by mouth every 8 (eight) hours as needed.  Dispense: 60 tablet; Refill: 2    Morbid obesity  Comments:  Will consult surgery see if patient a candidate for the truncal fat removal  Orders:  -     Ambulatory referral to General Surgery  -     Ambulatory referral to General Surgery    Intertriginous candidiasis  Comments:  abdominal skin folds  Orders:  -     clotrimazole-betamethasone 1-0.05% (LOTRISONE) cream; Apply topically 2 (two) times daily.  Dispense: 45 g; Refill: 2  -     Ambulatory referral to General Surgery  -     Ambulatory referral to General Surgery    Other orders  -     Cancel: QUEtiapine (SEROQUEL) 100 MG Tab; Take 1 tablet (100 mg total) by mouth nightly.  Dispense: 30 tablet; Refill: 5

## 2024-10-21 NOTE — OP NOTE
Discussed orthostatic hypotension. Discussed this can cause dizziness/lightheadedness. Discussed need to increase water intake, make sure to eat 3 meals daily. Encouraged patient to change position slowly. RTC if sx return/worsen, syncope, loc, seizure, cp, palpitations, unilateral weakness, confusion. Pt understands and agrees     [facility]  Surgery Department  Operative Note    SUMMARY     Date of Procedure: 8/24/2020     Procedure:  Right total knee arthroplasty    Surgeon(s) and Role:     * Silver Bob MD - Primary    Assisting Surgeon: FRANTZ    Pre-Operative Diagnosis: Primary osteoarthritis of right knee [M17.11]    Post-Operative Diagnosis: Post-Op Diagnosis Codes:     * Primary osteoarthritis of right knee [M17.11]    Anesthesia: Spinal    Intraoperative Findings:  BONE-ON-BONE ARTHRITIS RIGHT KNEE    Description of the Findings of the Procedure:  Patient was placed on the operating table in supine position.  She was prepped and draped in the usual sterile manner for surgery.  An anterior approach was undertaken to the knee.  It was carried down sharply through the skin.  The medial parapatellar tissues were visualized and released.  The patella was taken laterally.  The knee was flexed to 90°.  She had tremendous bone-on-bone arthritis with all 3 compartments involved with very little cartilage left in the knee and tremendous osteophyte formation.  A drill was used to gain access to the femur an intramedullary oxana was inserted up the femoral canal.  A cutting jig was pinned into position such that it would make a 5 degree valgus cut intake 9 mm of distal bone.  It was checked secondarily in the cut was made.  We now placed a femoral Sizer into position.  She sized to a size 5.  Size 5 cutting jig was pinned into position in the cuts were made.  A femoral trial was placed.  It fit perfectly.  The it was removed.  The tibia was subluxed anterior.  A cutting jig was pinned into position such that would make a neutral cut intake 2 mm of medial bone.  It was checked secondarily in the cut was made.  We placed a femoral trial and tibial trial.  The construct had full extension full flexion and symmetric flexion extension gaps.  We pulsed and irrigated.  We broached the tibia.  The patella was calibrated cut the  button was placed.  The construct trialed beautifully with no lift off.  We pulsed and irrigated and dried the bony surfaces.  We mixed up bone cement and cemented 1st the tibia next the femur and finally the patella.  All excess cement was removed at the time see mentation in the construct was held in full extension into all the cement totally hardened.  The actual spacer was tapped into position.  We copiously irrigated again.  We closed the extensor mechanism with a combination of 2.  FiberWire and a running Quill stitch.  We closed the subcu with 2 0 Vicryl and the skin with 4-0 Monocryl.  Sterile dressings were applied and the patient was noted to be in stable condition    Complications: No    Estimated Blood Loss (EBL): * No values recorded between 8/24/2020 11:21 AM and 8/24/2020 11:52 AM *           Implants:   Implant Name Type Inv. Item Serial No.  Lot No. LRB No. Used Action   CEMENT BONE ORTHOSET 1 40 GRAM - [id number]  CEMENT BONE ORTHOSET 1 40 GRAM  [employer] [id number] Right 2 Implanted   COMPONENT FEM SZ 5 CS/CR EDGARD R - [id number]  COMPONENT FEM SZ 5 CS/CR EDGARD R  [employer] [id number] Right 1 Implanted   PATELLA ONLAY 35MM TRI PEG - [id number]  PATELLA ONLAY 35MM TRI PEG  [employer] [id number] Right 1 Implanted   BASEPLATE TIB EVOLTN SZ 5 R - [id number]  BASEPLATE TIB EVOLTN SZ 5 R  [employer] [id number] Right 1 Implanted       Specimens:   Specimen (12h ago, onward)    None                  Condition: Good    Disposition: PACU - hemodynamically stable.

## (undated) DEVICE — STRAP OR TABLE 5IN X 72IN

## (undated) DEVICE — SEE MEDLINE ITEM 157166

## (undated) DEVICE — PAD CAST SPECIALIST STRL 6

## (undated) DEVICE — LINER SUCTION 3000CC

## (undated) DEVICE — SUT FIBERWIRE 2 38 IN TAPER

## (undated) DEVICE — DRAPE STERI INSTRUMENT 1018

## (undated) DEVICE — SOL 9P NACL IRR PIC IL

## (undated) DEVICE — SUT STRATAFIX SPIRAL VIOLET

## (undated) DEVICE — TOURNIQUET SB QC DP 34X4IN

## (undated) DEVICE — SEE MEDLINE ITEM 152622

## (undated) DEVICE — SEE MEDLINE ITEM 146231

## (undated) DEVICE — TAPE SILK 3IN

## (undated) DEVICE — SET DECANTER MEDICHOICE

## (undated) DEVICE — GLOVE SURG ULTRA TOUCH 7.5

## (undated) DEVICE — PACK LOWER EXTREMITY

## (undated) DEVICE — CUBE COLD THERAPY POLAR CARE

## (undated) DEVICE — ELECTRODE REM PLYHSV RETURN 9

## (undated) DEVICE — DRAPE STERI-DRAPE 1000 17X11IN

## (undated) DEVICE — INTERPULSE SET

## (undated) DEVICE — GOWN TOGA SYS PEELWY ZIP 2 XL

## (undated) DEVICE — APPLICATOR CHLORAPREP ORN 26ML

## (undated) DEVICE — PACK BASIC

## (undated) DEVICE — BOWL SUMMIT VACUUM CEMENT

## (undated) DEVICE — SEE MEDLINE ITEM 157131

## (undated) DEVICE — ALCOHOL 70% ISOP RUBBING 4OZ

## (undated) DEVICE — UNDERGLOVES BIOGEL PI SIZE 8.5

## (undated) DEVICE — SUT MONOCRYL 3-0 PS-1

## (undated) DEVICE — DRAPE INCISE IOBAN 2 23X17IN

## (undated) DEVICE — UNDERGLOVES BIOGEL PI SIZE 7.5

## (undated) DEVICE — PACK CUSTOM UNIV BASIN SLI

## (undated) DEVICE — SOL IRR NACL .9% 3000ML

## (undated) DEVICE — GLOVE SURG ULTRA TOUCH 8.5

## (undated) DEVICE — SYR 50CC LL

## (undated) DEVICE — SUT STRATAFIX PDS 1 CTX 18IN

## (undated) DEVICE — TUBE SUCTION YANKAUER HI CAP

## (undated) DEVICE — MANIFOLD 4 PORT

## (undated) DEVICE — BLADE SURG CARBON STEEL #10

## (undated) DEVICE — BLADE SAGITTAL 21 X 90 1.19MM

## (undated) DEVICE — TOGA FLYTE PEEL AWAY XLARGE

## (undated) DEVICE — NDL SPINAL 18GX3.5 SPINOCAN

## (undated) DEVICE — SEE MEDLINE ITEM 152530

## (undated) DEVICE — ADHESIVE MASTISOL VIAL 48/BX

## (undated) DEVICE — SLEEVE SCD EXPRESS CALF MEDIUM

## (undated) DEVICE — SPONGE SUPER KERLIX 6X6.75IN

## (undated) DEVICE — CLOSURE SKIN STERI STRIP 1/2X4

## (undated) DEVICE — SEE MEDLINE ITEM 107746

## (undated) DEVICE — BLADE SAW SGTL 21.2X85.5X1.2

## (undated) DEVICE — BANDAGE ESMARK 6X12

## (undated) DEVICE — UNDERGLOVES BIOGEL PI SIZE 8